# Patient Record
Sex: FEMALE | Race: WHITE | NOT HISPANIC OR LATINO | Employment: OTHER | URBAN - METROPOLITAN AREA
[De-identification: names, ages, dates, MRNs, and addresses within clinical notes are randomized per-mention and may not be internally consistent; named-entity substitution may affect disease eponyms.]

---

## 2023-04-30 ASSESSMENT — PROMIS GLOBAL HEALTH SCALE
EMOTIONAL_PROBLEMS: NEVER
RATE_AVERAGE_FATIGUE: MODERATE
CARRYOUT_PHYSICAL_ACTIVITIES: MOSTLY
RATE_AVERAGE_PAIN: 2
RATE_PHYSICAL_HEALTH: GOOD
RATE_QUALITY_OF_LIFE: VERY GOOD
RATE_GENERAL_HEALTH: GOOD
CARRYOUT_SOCIAL_ACTIVITIES: VERY GOOD
RATE_MENTAL_HEALTH: VERY GOOD
RATE_SOCIAL_SATISFACTION: VERY GOOD

## 2023-05-03 PROBLEM — R43.8 DECREASED SENSE OF SMELL: Status: ACTIVE | Noted: 2023-05-03

## 2023-05-03 PROBLEM — M25.559 ARTHRALGIA OF HIP: Status: ACTIVE | Noted: 2023-05-03

## 2023-05-03 PROBLEM — E03.9 HYPOTHYROIDISM (ACQUIRED): Status: ACTIVE | Noted: 2023-05-03

## 2023-05-03 PROBLEM — C34.90 LUNG CANCER (MULTI): Status: ACTIVE | Noted: 2023-05-03

## 2023-05-03 PROBLEM — J34.2 DEVIATED NASAL SEPTUM: Status: ACTIVE | Noted: 2023-05-03

## 2023-05-03 PROBLEM — H04.123 DRY EYES: Status: ACTIVE | Noted: 2023-05-03

## 2023-05-03 PROBLEM — J34.89 NASAL DRYNESS: Status: ACTIVE | Noted: 2023-05-03

## 2023-05-03 PROBLEM — M85.80 OSTEOPENIA: Status: ACTIVE | Noted: 2023-05-03

## 2023-05-03 PROBLEM — H93.8X3 FULLNESS IN EAR, BILATERAL: Status: ACTIVE | Noted: 2023-05-03

## 2023-05-03 PROBLEM — J31.0 CHRONIC RHINITIS: Status: ACTIVE | Noted: 2023-05-03

## 2023-05-03 PROBLEM — F41.9 ANXIETY: Status: ACTIVE | Noted: 2023-05-03

## 2023-05-03 PROBLEM — R44.8 FACIAL PRESSURE: Status: ACTIVE | Noted: 2023-05-03

## 2023-05-03 PROBLEM — N25.89 TYPE I RTA: Status: ACTIVE | Noted: 2023-05-03

## 2023-05-03 PROBLEM — R73.9 HYPERGLYCEMIA: Status: ACTIVE | Noted: 2023-05-03

## 2023-05-03 PROBLEM — R06.09 DOE (DYSPNEA ON EXERTION): Status: ACTIVE | Noted: 2023-05-03

## 2023-05-03 PROBLEM — H25.11 AGE-RELATED NUCLEAR CATARACT OF RIGHT EYE: Status: ACTIVE | Noted: 2023-05-03

## 2023-05-03 PROBLEM — N95.2 VAGINAL ATROPHY: Status: ACTIVE | Noted: 2023-05-03

## 2023-05-03 PROBLEM — R09.82 POSTNASAL DRIP: Status: ACTIVE | Noted: 2023-05-03

## 2023-05-03 PROBLEM — F51.01 PRIMARY INSOMNIA: Status: ACTIVE | Noted: 2023-05-03

## 2023-05-03 PROBLEM — C34.90 ADENOCARCINOMA OF LUNG (MULTI): Status: ACTIVE | Noted: 2023-05-03

## 2023-05-03 PROBLEM — D05.92 CARCINOMA IN SITU OF LEFT BREAST: Status: ACTIVE | Noted: 2023-05-03

## 2023-05-03 PROBLEM — H25.811 COMBINED FORMS OF AGE-RELATED CATARACT OF RIGHT EYE: Status: ACTIVE | Noted: 2023-05-03

## 2023-05-03 PROBLEM — J44.9 CHRONIC OBSTRUCTIVE PULMONARY DISEASE (MULTI): Status: ACTIVE | Noted: 2023-05-03

## 2023-05-03 PROBLEM — H04.123 DRY EYE SYNDROME OF BOTH LACRIMAL GLANDS: Status: ACTIVE | Noted: 2023-05-03

## 2023-05-03 PROBLEM — H25.812 COMBINED FORMS OF AGE-RELATED CATARACT OF LEFT EYE: Status: ACTIVE | Noted: 2023-05-03

## 2023-05-03 PROBLEM — R13.10 DYSPHAGIA: Status: ACTIVE | Noted: 2023-05-03

## 2023-05-03 PROBLEM — H25.12 AGE-RELATED NUCLEAR CATARACT OF LEFT EYE: Status: ACTIVE | Noted: 2023-05-03

## 2023-05-03 PROBLEM — C34.11 MALIGNANT NEOPLASM OF UPPER LOBE OF RIGHT LUNG (MULTI): Status: ACTIVE | Noted: 2023-05-03

## 2023-05-03 PROBLEM — K21.9 ESOPHAGEAL REFLUX: Status: ACTIVE | Noted: 2023-05-03

## 2023-05-03 PROBLEM — E78.5 DYSLIPIDEMIA: Status: ACTIVE | Noted: 2023-05-03

## 2023-05-03 RX ORDER — OMEPRAZOLE 20 MG/1
40 CAPSULE, DELAYED RELEASE ORAL
COMMUNITY
Start: 2020-04-27 | End: 2024-02-09 | Stop reason: SDUPTHER

## 2023-05-03 RX ORDER — ALBUTEROL SULFATE 90 UG/1
2 AEROSOL, METERED RESPIRATORY (INHALATION) EVERY 6 HOURS PRN
COMMUNITY
Start: 2016-01-27 | End: 2024-02-09 | Stop reason: ALTCHOICE

## 2023-05-03 RX ORDER — MULTIVITAMIN
1 TABLET ORAL DAILY
COMMUNITY
End: 2024-02-09 | Stop reason: ALTCHOICE

## 2023-05-03 RX ORDER — HYDROXYZINE PAMOATE 25 MG/1
25 CAPSULE ORAL EVERY 6 HOURS PRN
COMMUNITY
Start: 2022-05-02 | End: 2024-01-11 | Stop reason: ALTCHOICE

## 2023-05-03 RX ORDER — UBIDECARENONE 50 MG
1 CAPSULE ORAL 2 TIMES DAILY
COMMUNITY
Start: 2020-04-27 | End: 2023-05-05 | Stop reason: ALTCHOICE

## 2023-05-03 RX ORDER — MELATONIN 10 MG
1 CAPSULE ORAL NIGHTLY
COMMUNITY
Start: 2017-03-29 | End: 2024-02-09 | Stop reason: ALTCHOICE

## 2023-05-03 NOTE — PROGRESS NOTES
Subjective   Reason for Visit: Victorina Velez is an 72 y.o. female here for a Medicare Wellness visit.               HPI  The patient reports a history of intermittent episodes of stiffness and pain-unable to describe-at the CMC joint of the right thumb since childhood.  She reports that she will experience the stiffness with movement.  She reports no precipitating factors for the pain.  She reports no associated symptoms.    She also reports a history of more difficulty remembering names over the past year.  She reports no associated symptoms.    She reports occasional episodes of itching located over the back of the head over the past year.  She reports no precipitating, exacerbating, relieving factors.  Over the past year, she still reports continued chronic intermittent episodes of itching located over the upper and mid back regions.  She again reports that the episodes only occur when the patient leans against the back of a chair or another surface.  She reports no other associated symptoms and no change in the frequency or intensity of the episodes over the past year.    The patient reports continued chronic intermittent episodes of aching pain in the lower back region over the past year.  She reports that the episodes still are precipitated by walking longer distances.  She does report experiencing radiation of pain to the left calf for period of a few weeks late last year but has not experienced any radiation of pain recently.  She does report occasional episodes of weakness in the left leg occurring after periods of walking longer distances.  She reports no other associated symptoms.  She does report an increase in the frequency and intensity of the episodes over the past year.    She does report continued chronic intermittent episodes of soreness in the left hip region over the past year.  She reports that the episodes are precipitated by walking every day and by rising after having sat.  She reports no  radiation of discomfort and no other associated symptoms.  She does report an increase in the frequency and intensity of the episodes of soreness over the past year.    The patient reports that over the past year she has only been dyspneic when lifting heavy objects.  She has not experienced dyspnea walking up and down stairs.    She does report continued chronic nasal congestion and rhinorrhea times years.  She reports continued chronic constant thickness and irritation in the lower throat, continued chronic cough productive of clear sputum, continued chronic frequent belching times years-all unchanged over the past year.  She reports no associated symptoms.  She does report that she will experience less nasal congestion with use of AYR and Flonase Sensimist.    She reports no episodes of ear popping over the past year.  No other new complaints.  Patient Care Team:  Raghavendra Chávez MD as PCP - General  Raghavendra Chávez MD as PCP - [facility] Attributed Provider     Review of Systems  All systems have been reviewed and are normal except as previously noted  Objective   Vitals:  There were no vitals taken for this visit.      Physical Exam  Head- palpation revealed no tenderness over the maxillary or frontal sinuses  Eyes- extraocular movements intact pupils equal and reactive to light; right fundus revealed good retinal color, no hemorrhages or exudates; left fundus not well-visualized.  Ears- palpation of pinnas and traguses revealed no tenderness. External auditory canals not erythematous or swollen.  TMs clear  Nose-turbinates not erythematous or swollen; nasal septal deviation noted to the left  Mouth -no xerostomia noted. Posterior pharynx is not erythematous or swollen. Tonsillar pillars appeared normal no exudates  Neck no lymphadenopathy. Thyroid gland not enlarged. , No bruits.  Lungs-auscultation revealed occasional mild end expiratory wheezing left lower lung fields. No crackles noted. No dullness to  percussion noted. Normal fremitus, no egophony  Cardiac-rate normal rhythm regular no murmurs no JVD  Abdomen-soft nondistended normal active bowel sounds. Palpation revealed mild tenderness over the epigastrium and periumbilical regions,. No rebound tenderness or masses. Liver percussed to 8 cm in total span.  Pulses 2+ bilaterally in the upper extremities, 0 bilaterally in the lower extremities    Extremities-no peripheral edema  Musculoskeletal  CMC joint right thumb-no erythema or swelling.  Full range of motion in all directions of motion with no pain.  Palpation however did reveal moderate tenderness but no increase in warmth  Lumbar spine-no erythema or swelling. Full range of motion with pain and stiffness noted on bending to the left.  Full range of motion with no pain or stiffness noted in all other directions of motion.  Palpation revealed no tenderness or increase in warmth  Left hip-no erythema or swelling. Windshield wiper test negative. Full range of motion with pain noted on abduction and internal rotation. Full range of motion with no pain noted in all other directions of motion.. Palpation tenderness over the greater trochanter, no increase in warmth  Skin-yellowish discoloration of the toenails of both first toes noted bilaterally.  Yellowish discoloration also noted left fifth toe.  No erythema or eruptions noted.    Neurologic  Mental status-alert and oriented x3   Cranial nerves-2 through 12 grossly intact, no visual field abnormalities  Motor-no pronator drift noted, strength-5/5 in all muscle groups tested, except plantar flexors of both ankles 4/5, no tremor noted.  No bradykinesia noted.  No rigidity noted.  Negative pull test  Sensory-Light touch sensation fully intact  Pinprick sensation slightly diminished forefoot region right foot  Vibratory sensation diminished in the first toes bilaterally right greater than left and equally in the ankles and knees bilaterally  Cerebellar-no truncal  ataxia, good coordination finger-nose testing,, good coordination heel-to-shin testing, normal rapid alternating movements  Romberg negative, mildly decreased coordination in tandem gait  Reflexes-2+/4 bilaterally in the upper extremities, knee jerks 1+/4 bilaterally, ankle jerks 0/4 bilaterally    Mini cog test-3/3 words remembered, see clock face test dated May 4, 2023  Assessment/Plan   Problem List Items Addressed This Visit    None           Assessment  Noncardiac chest pain  Pericardial effusion-probably malignant in nature August 2014.  Chronic dyspnea on exertion--may be secondary to untreated obstructive/restrictive lung disease in the setting of lack of conditioning  Obstructive/restrictive lung disease.  Adenocarcinoma of the right lung status post right lower lobe lobectomy December 18, 2014-mediastinal lymph node dissection status post chemoradiation.  Bronchiectasis right perihilar region  Right pleural effusion-chronic  Radiation pneumonitis August 2015  COVID-19 December 2021  Chronic nasal congestion and rhinorrhea-probably secondary to structural abnormality, allergic rhinitis, nonallergic rhinitis  Deviated nasal septum to the left  Chronic constant thickness and irritation located in the lower throat-unsure of etiology. May be secondary to gastroesophageal reflux..  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 secondary overactive bladder  Chronic intermittent episodes of urinary  incontinence only occurring when the patient waits too long? 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  Chronic Intermittent episodes of soreness located in the left hip region-may be secondary to osteoarthritis of the left hip  Osteoarthritis of the left hip  Right hip greater trochanteric bursitis.  Status post right total hip replacement 2002.  Distal metatarsal fractures right fourth and fifth toes January 2014.  Impaired fasting glucose  Osteoporosis  Hypothyroidism  Hyperlipidemia  Acne rosacea  Chronic presence of a cystic lesion on the right elbow-likely represents a epidermal cyst  Ruptured epidermal cyst right elbow June 2020  Chronic intermittent itching noted in the medial corners of both eyes-may be secondary to dry eye syndrome  Bilateral cataracts status post removal of cataract right eye April 27, 2022  Bilateral posterior vitreous detachments  Dry eye syndrome...  Worsening memory for names-may be secondary to age-related cognitive decline  Intermittent episodes of itching located in the back of the head-unsure of etiology.  May be secondary to occipital neuralgia  Chronic intermittent episodes of itching located over the upper and mid back regions-unsure of etiology.  Chronic constant numbness noted underneath the third, fourth and fifth toes of both feet-unsure of etiology. May be secondary to peripheral neuropathy.  Chronic intermittent episodes of numbness in the fingers of both hands only occurring when the patient raises her arms when sleeping-may be secondary to bilateral compression neuropathy possibly bilateral ulnar neuropathy  Multilevel cervical spondylosis  Scoliosis thoracic spine..  Chronic  intermittent episodes of aching pain in the lower back region-probably secondary to osteoarthritis and degenerative disc disease of the lumbosacral spine  Central canal stenosis of the lumbar spine.  Bilateral neuroforaminal stenosis of the lumbar spine.  Chronic insomnia manifested as difficulty staying asleep-probably secondary to primary insomnia, anxiety.  Anxiety        Plan   Obtain EKG today.. Obtain urinalysis today.  Obtain CBC differential, CMP, fasting lipid profile, TSH, hemoglobin A1c, free T3, free T4, vitamin B12 level, vitamin D level today  Obtain repeat CT scan of the chest in August 2023. Refer back to ENT for further evaluation and treatment  I have urged the patient to use Gaviscon after each meal.  I did recommend that the patient begin application of Voltaren gel to painful regions every 6 hours as needed  The patient will continue all of her other current medications..  The patient will call me in 6 weeks with her condition regarding chronic constant thickness and irritation in the lower throat, chronic cough, chronic frequent belching  Patient should return for annual Medicare wellness visit in one year

## 2023-05-04 ENCOUNTER — OFFICE VISIT (OUTPATIENT)
Dept: PRIMARY CARE | Facility: CLINIC | Age: 73
End: 2023-05-04
Payer: MEDICARE

## 2023-05-04 ENCOUNTER — LAB (OUTPATIENT)
Dept: LAB | Facility: LAB | Age: 73
End: 2023-05-04
Payer: MEDICARE

## 2023-05-04 VITALS — BODY MASS INDEX: 23.97 KG/M2 | WEIGHT: 146.25 LBS

## 2023-05-04 DIAGNOSIS — E55.9 VITAMIN D DEFICIENCY: ICD-10-CM

## 2023-05-04 DIAGNOSIS — F51.01 PRIMARY INSOMNIA: ICD-10-CM

## 2023-05-04 DIAGNOSIS — R73.03 PREDIABETES: ICD-10-CM

## 2023-05-04 DIAGNOSIS — J44.9 CHRONIC OBSTRUCTIVE PULMONARY DISEASE, UNSPECIFIED COPD TYPE (MULTI): ICD-10-CM

## 2023-05-04 DIAGNOSIS — Z00.00 HEALTH MAINTENANCE EXAMINATION: Primary | ICD-10-CM

## 2023-05-04 DIAGNOSIS — C34.92 ADENOCARCINOMA OF LEFT LUNG (MULTI): ICD-10-CM

## 2023-05-04 DIAGNOSIS — C34.11 MALIGNANT NEOPLASM OF UPPER LOBE OF RIGHT LUNG (MULTI): ICD-10-CM

## 2023-05-04 DIAGNOSIS — E03.9 HYPOTHYROIDISM (ACQUIRED): ICD-10-CM

## 2023-05-04 DIAGNOSIS — R06.09 DOE (DYSPNEA ON EXERTION): ICD-10-CM

## 2023-05-04 LAB
ALANINE AMINOTRANSFERASE (SGPT) (U/L) IN SER/PLAS: 17 U/L (ref 7–45)
ALBUMIN (G/DL) IN SER/PLAS: 4.4 G/DL (ref 3.4–5)
ALKALINE PHOSPHATASE (U/L) IN SER/PLAS: 85 U/L (ref 33–136)
ALLERGEN ANIMAL: CAT DANDER IGE (KU/L): NORMAL
ALLERGEN ANIMAL: DOG DANDER IGE (KU/L): NORMAL
ALLERGEN GRASS: BERMUDA GRASS (CYNODON DACTYLON) IGE (KU/L): NORMAL
ALLERGEN GRASS: JOHNSON GRASS (SORGHUM HALEPENSE) IGE (KU/L): NORMAL
ALLERGEN GRASS: MEADOW GRASS, KENTUCKY BLUE (POA PRATENSIS )IGE (KU/L): NORMAL
ALLERGEN GRASS: TIMOTHY GRASS (PHLEUM PRATENSE) IGE (KU/L): NORMAL
ALLERGEN INSECT: COCKROACH IGE: NORMAL
ALLERGEN MICROORGANISM: ALTERNARIA ALTERNATA IGE (KU/L): NORMAL
ALLERGEN MICROORGANISM: ASPERGILLUS FUMIGATUS IGE (KU/L): NORMAL
ALLERGEN MICROORGANISM: CLADOSPORIUM HERBARUM IGE (KU/L): NORMAL
ALLERGEN MICROORGANISM: PENICILLIUM CHRYSOGENUM (P. NOTATUM) IGE (KU/L): NORMAL
ALLERGEN MITE: DERMATOPHAGOIDES FARINAE (HOUSE DUST MITE) IGE (KU/L): NORMAL
ALLERGEN MITE: DERMATOPHAGOIDES PTERONYSSINUS (HOUSE DUST MITE) IGE (KU/L): NORMAL
ALLERGEN TREE: BOX-ELDER (ACER NEGUNDO) IGE (KU/L): NORMAL
ALLERGEN TREE: COMMON SILVER BIRCH (BETULA VERRUCOSA) IGE (KU/L): NORMAL
ALLERGEN TREE: COTTONWOOD (POPULUS DELTOIDES) IGE (KU/L): NORMAL
ALLERGEN TREE: ELM (ULMUS AMERICANA) IGE (KU/L): NORMAL
ALLERGEN TREE: MAPLE LEAF SYCAMORE, LONDON PLANE IGE (KU/L): NORMAL
ALLERGEN TREE: MOUNTAIN JUNIPER (JUNIPERUS SABINOIDES) IGE (KU/L): NORMAL
ALLERGEN TREE: MULBERRY (MORUS ALBA) IGE (KU/L): NORMAL
ALLERGEN TREE: OAK (QUERCUS ALBA) IGE (KU/L): NORMAL
ALLERGEN TREE: PECAN, HICKORY (CARYA PECAN) IGE (KU/L): NORMAL
ALLERGEN TREE: WALNUT IGE: NORMAL
ALLERGEN TREE: WHITE ASH (FRAXINUS AMERICANA) IGE (KU/L): NORMAL
ALLERGEN WEED: COMMON PIGWEED (AMARANTHUS RETROFLEXUS) IGE (KU/L): NORMAL
ALLERGEN WEED: COMMON RAGWEED (AMB. ARTEMISIIFOLIA/A. ELATIOR) IGE (KU/L): NORMAL
ALLERGEN WEED: GOOSEFOOT, LAMB'S QUARTERS (CHENOPODIUM ALBUM) IGE (KU/L): NORMAL
ALLERGEN WEED: PLANTAIN (ENGLISH), RIBWORT (PLANTAGO LANCEOLATA) IGE (KU/L): NORMAL
ALLERGEN WEED: PRICKLY SALTWORT/RUSSIAN THISTLE (SALSOLA KALI) IGE (KU/L): NORMAL
ALLERGEN WEED: SHEEP SORREL (RUMEX ACETOSELLA) IGE (KU/L): NORMAL
ANION GAP IN SER/PLAS: 16 MMOL/L (ref 10–20)
ASPARTATE AMINOTRANSFERASE (SGOT) (U/L) IN SER/PLAS: 18 U/L (ref 9–39)
BASOPHILS (10*3/UL) IN BLOOD BY AUTOMATED COUNT: 0.02 X10E9/L (ref 0–0.1)
BASOPHILS/100 LEUKOCYTES IN BLOOD BY AUTOMATED COUNT: 0.5 % (ref 0–2)
BILIRUBIN TOTAL (MG/DL) IN SER/PLAS: 1 MG/DL (ref 0–1.2)
CALCIDIOL (25 OH VITAMIN D3) (NG/ML) IN SER/PLAS: 92 NG/ML
CALCIUM (MG/DL) IN SER/PLAS: 9.7 MG/DL (ref 8.6–10.6)
CARBON DIOXIDE, TOTAL (MMOL/L) IN SER/PLAS: 25 MMOL/L (ref 21–32)
CHLORIDE (MMOL/L) IN SER/PLAS: 104 MMOL/L (ref 98–107)
CHOLESTEROL (MG/DL) IN SER/PLAS: 283 MG/DL (ref 0–199)
CHOLESTEROL IN HDL (MG/DL) IN SER/PLAS: 50.8 MG/DL
CHOLESTEROL/HDL RATIO: 5.6
COBALAMIN (VITAMIN B12) (PG/ML) IN SER/PLAS: 714 PG/ML (ref 211–911)
CREATININE (MG/DL) IN SER/PLAS: 0.88 MG/DL (ref 0.5–1.05)
EOSINOPHILS (10*3/UL) IN BLOOD BY AUTOMATED COUNT: 0.08 X10E9/L (ref 0–0.4)
EOSINOPHILS/100 LEUKOCYTES IN BLOOD BY AUTOMATED COUNT: 2 % (ref 0–6)
ERYTHROCYTE DISTRIBUTION WIDTH (RATIO) BY AUTOMATED COUNT: 13.2 % (ref 11.5–14.5)
ERYTHROCYTE MEAN CORPUSCULAR HEMOGLOBIN CONCENTRATION (G/DL) BY AUTOMATED: 32.6 G/DL (ref 32–36)
ERYTHROCYTE MEAN CORPUSCULAR VOLUME (FL) BY AUTOMATED COUNT: 88 FL (ref 80–100)
ERYTHROCYTES (10*6/UL) IN BLOOD BY AUTOMATED COUNT: 4.68 X10E12/L (ref 4–5.2)
ESTIMATED AVERAGE GLUCOSE FOR HBA1C: 111 MG/DL
GFR FEMALE: 69 ML/MIN/1.73M2
GLUCOSE (MG/DL) IN SER/PLAS: 115 MG/DL (ref 74–99)
HEMATOCRIT (%) IN BLOOD BY AUTOMATED COUNT: 41.4 % (ref 36–46)
HEMOGLOBIN (G/DL) IN BLOOD: 13.5 G/DL (ref 12–16)
HEMOGLOBIN A1C/HEMOGLOBIN TOTAL IN BLOOD: 5.5 %
IMMATURE GRANULOCYTES/100 LEUKOCYTES IN BLOOD BY AUTOMATED COUNT: 0.5 % (ref 0–0.9)
IMMUNOCAP IGE: NORMAL
IMMUNOCAP INTERPRETATION: NORMAL
LDL: 192 MG/DL (ref 0–99)
LEUKOCYTES (10*3/UL) IN BLOOD BY AUTOMATED COUNT: 3.9 X10E9/L (ref 4.4–11.3)
LYMPHOCYTES (10*3/UL) IN BLOOD BY AUTOMATED COUNT: 1.06 X10E9/L (ref 0.8–3)
LYMPHOCYTES/100 LEUKOCYTES IN BLOOD BY AUTOMATED COUNT: 27 % (ref 13–44)
MONOCYTES (10*3/UL) IN BLOOD BY AUTOMATED COUNT: 0.42 X10E9/L (ref 0.05–0.8)
MONOCYTES/100 LEUKOCYTES IN BLOOD BY AUTOMATED COUNT: 10.7 % (ref 2–10)
NEUTROPHILS (10*3/UL) IN BLOOD BY AUTOMATED COUNT: 2.33 X10E9/L (ref 1.6–5.5)
NEUTROPHILS/100 LEUKOCYTES IN BLOOD BY AUTOMATED COUNT: 59.3 % (ref 40–80)
NON HDL CHOLESTEROL: 232 MG/DL
NRBC (PER 100 WBCS) BY AUTOMATED COUNT: 0 /100 WBC (ref 0–0)
PLATELETS (10*3/UL) IN BLOOD AUTOMATED COUNT: 277 X10E9/L (ref 150–450)
POC APPEARANCE, URINE: CLEAR
POC BILIRUBIN, URINE: NEGATIVE
POC BLOOD, URINE: NEGATIVE
POC COLOR, URINE: YELLOW
POC GLUCOSE, URINE: NEGATIVE MG/DL
POC KETONES, URINE: NEGATIVE MG/DL
POC LEUKOCYTES, URINE: NEGATIVE
POC NITRITE,URINE: NEGATIVE
POC PH, URINE: 7.5 PH
POC PROTEIN, URINE: NEGATIVE MG/DL
POC SPECIFIC GRAVITY, URINE: 1.01
POC UROBILINOGEN, URINE: 0.2 EU/DL
POTASSIUM (MMOL/L) IN SER/PLAS: 4.1 MMOL/L (ref 3.5–5.3)
PROTEIN TOTAL: 7 G/DL (ref 6.4–8.2)
SODIUM (MMOL/L) IN SER/PLAS: 141 MMOL/L (ref 136–145)
THYROTROPIN (MIU/L) IN SER/PLAS BY DETECTION LIMIT <= 0.05 MIU/L: 2.86 MIU/L (ref 0.44–3.98)
THYROXINE (T4) FREE (NG/DL) IN SER/PLAS: 1.18 NG/DL (ref 0.78–1.48)
TRIGLYCERIDE (MG/DL) IN SER/PLAS: 202 MG/DL (ref 0–149)
TRIIODOTHYRONINE (T3) FREE (PG/ML) IN SER/PLAS: 3.5 PG/ML (ref 2.3–4.2)
UREA NITROGEN (MG/DL) IN SER/PLAS: 15 MG/DL (ref 6–23)
VLDL: 40 MG/DL (ref 0–40)

## 2023-05-04 PROCEDURE — 84443 ASSAY THYROID STIM HORMONE: CPT

## 2023-05-04 PROCEDURE — 82607 VITAMIN B-12: CPT

## 2023-05-04 PROCEDURE — 36415 COLL VENOUS BLD VENIPUNCTURE: CPT

## 2023-05-04 PROCEDURE — 99213 OFFICE O/P EST LOW 20 MIN: CPT | Performed by: INTERNAL MEDICINE

## 2023-05-04 PROCEDURE — 84439 ASSAY OF FREE THYROXINE: CPT

## 2023-05-04 PROCEDURE — 1159F MED LIST DOCD IN RCRD: CPT | Performed by: INTERNAL MEDICINE

## 2023-05-04 PROCEDURE — 83036 HEMOGLOBIN GLYCOSYLATED A1C: CPT

## 2023-05-04 PROCEDURE — 80061 LIPID PANEL: CPT

## 2023-05-04 PROCEDURE — 1170F FXNL STATUS ASSESSED: CPT | Performed by: INTERNAL MEDICINE

## 2023-05-04 PROCEDURE — 84481 FREE ASSAY (FT-3): CPT

## 2023-05-04 PROCEDURE — 85025 COMPLETE CBC W/AUTO DIFF WBC: CPT

## 2023-05-04 PROCEDURE — 81002 URINALYSIS NONAUTO W/O SCOPE: CPT | Performed by: INTERNAL MEDICINE

## 2023-05-04 PROCEDURE — 82306 VITAMIN D 25 HYDROXY: CPT

## 2023-05-04 PROCEDURE — 80053 COMPREHEN METABOLIC PANEL: CPT

## 2023-05-04 PROCEDURE — 1160F RVW MEDS BY RX/DR IN RCRD: CPT | Performed by: INTERNAL MEDICINE

## 2023-05-04 ASSESSMENT — ACTIVITIES OF DAILY LIVING (ADL)
DOING_HOUSEWORK: INDEPENDENT
TAKING_MEDICATION: INDEPENDENT
GROCERY_SHOPPING: INDEPENDENT
DRESSING: INDEPENDENT
BATHING: INDEPENDENT
MANAGING_FINANCES: INDEPENDENT

## 2023-05-04 ASSESSMENT — PATIENT HEALTH QUESTIONNAIRE - PHQ9
1. LITTLE INTEREST OR PLEASURE IN DOING THINGS: NOT AT ALL
SUM OF ALL RESPONSES TO PHQ9 QUESTIONS 1 AND 2: 0
2. FEELING DOWN, DEPRESSED OR HOPELESS: NOT AT ALL

## 2023-05-04 ASSESSMENT — ENCOUNTER SYMPTOMS
DEPRESSION: 0
OCCASIONAL FEELINGS OF UNSTEADINESS: 0
LOSS OF SENSATION IN FEET: 0

## 2023-05-05 DIAGNOSIS — E78.5 DYSLIPIDEMIA: Primary | ICD-10-CM

## 2023-05-05 RX ORDER — SIMVASTATIN 20 MG/1
20 TABLET, FILM COATED ORAL NIGHTLY
Qty: 90 TABLET | Refills: 2 | Status: SHIPPED | OUTPATIENT
Start: 2023-05-05 | End: 2024-01-11 | Stop reason: WASHOUT

## 2023-05-05 RX ORDER — SIMVASTATIN 20 MG/1
20 TABLET, FILM COATED ORAL NIGHTLY
COMMUNITY
End: 2023-05-05 | Stop reason: SDUPTHER

## 2023-05-05 NOTE — PROGRESS NOTES
Labs reviewed.  I told the patient to discontinue red yeast rice and I have started her back on simvastatin at a dose of 20 mg nightly.  She will have a fasting lipid profile at the time that she has her surveillance CT scan of the chest later this summer

## 2023-08-09 DIAGNOSIS — C34.12 MALIGNANT NEOPLASM OF UPPER LOBE OF LEFT LUNG (MULTI): Primary | ICD-10-CM

## 2023-10-24 ENCOUNTER — OFFICE VISIT (OUTPATIENT)
Dept: OTOLARYNGOLOGY | Facility: CLINIC | Age: 73
End: 2023-10-24
Payer: MEDICARE

## 2023-10-24 VITALS — WEIGHT: 146 LBS | BODY MASS INDEX: 23.93 KG/M2 | TEMPERATURE: 97.6 F

## 2023-10-24 DIAGNOSIS — J34.89 NASAL AND SINUS DISCHARGE: ICD-10-CM

## 2023-10-24 DIAGNOSIS — R44.8 FACIAL PRESSURE: ICD-10-CM

## 2023-10-24 DIAGNOSIS — J34.2 NASAL SEPTAL DEVIATION: ICD-10-CM

## 2023-10-24 DIAGNOSIS — J31.0 CHRONIC RHINITIS: ICD-10-CM

## 2023-10-24 DIAGNOSIS — J34.2 DEVIATED NASAL SEPTUM: Primary | ICD-10-CM

## 2023-10-24 DIAGNOSIS — M26.69 TMJ CREPITUS: ICD-10-CM

## 2023-10-24 PROCEDURE — 1036F TOBACCO NON-USER: CPT | Performed by: NURSE PRACTITIONER

## 2023-10-24 PROCEDURE — 99213 OFFICE O/P EST LOW 20 MIN: CPT | Performed by: NURSE PRACTITIONER

## 2023-10-24 PROCEDURE — 1159F MED LIST DOCD IN RCRD: CPT | Performed by: NURSE PRACTITIONER

## 2023-10-24 PROCEDURE — 31231 NASAL ENDOSCOPY DX: CPT | Performed by: NURSE PRACTITIONER

## 2023-10-24 PROCEDURE — 1160F RVW MEDS BY RX/DR IN RCRD: CPT | Performed by: NURSE PRACTITIONER

## 2023-10-24 RX ORDER — DOXYCYCLINE HYCLATE 100 MG
TABLET ORAL
COMMUNITY
End: 2024-01-11 | Stop reason: ALTCHOICE

## 2023-10-24 ASSESSMENT — PATIENT HEALTH QUESTIONNAIRE - PHQ9
SUM OF ALL RESPONSES TO PHQ9 QUESTIONS 1 AND 2: 0
2. FEELING DOWN, DEPRESSED OR HOPELESS: NOT AT ALL
1. LITTLE INTEREST OR PLEASURE IN DOING THINGS: NOT AT ALL

## 2023-10-24 NOTE — PROGRESS NOTES
Subjective   Patient ID: Victorina Velez is a 73 y.o. female who presents for Sinusitis.  HPI  71 year old female with resolving episode of acute sinusitis following antibiotic therapy with persisting nasal discomfort. Area of crusting located along the right anterior septum and right middle turbinate. Recommended treatment with saline irrigations and mupirocin ointment.  11/16/22- Improved nasal exam with the exception of inferior turbinate hypertrophy and mucoid secretions throughout. Rec. flonase sensimist and saline gel.  3/15/23- Doing well with saline. Will resume flonase sensimist in spring. Allergy panel ordered.  10/24/23- Patient presents for follow-up after doing very well since March. She notes that last week she started having right sided facial pain. [facility] and was prescribed a 5 day course of doxycycline, which has not helped. She notes pressure to the right maxillary area. She has been irrigating but it has not been productive. She also feels discomfort in her right upper teeth. She has not been using nasal sprays because they make her nose too sore. She denies any nasal obstruction.    Review of Systems  Review of systems is negative for constitutional, ophthalmological, cardiac, pulmonary, renal, gastrointestinal, musculoskeletal, mental health, endocrine, or neurologic disorders (except as listed in the HPI, PMH, and Problem List).     Objective   Physical Exam  CONSTITUTIONAL: Vital signs reviewed. Patient appears well developed and well nourished.   GENERAL: this is a healthy appearing female who appears stated age. The patient is alert and appropriately verbally conversant without hoarseness. This patient is in no apparent distress.   FACE: The face was inspected and no cutaneous masses or lesions were visualized. There was no erythema or edema noted. Facial movement was symmetric. No skin lesions were detected. There was no sinus tenderness elicited. TMJ crepitus  present.   EYES: Extra-ocular muscle function was intact. No nystagmus was observed. Pupils were equal.     NOSE: Examination of the nose revealed the nasal dorsum to be midline. Intranasal exam reveals the septum is deviated left. The inferior turbinates were hypertrophic. No masses, polyps, mucopus, or other lesion on anterior rhinoscopy. See below procedure note as applicable for further exam.  ORAL CAVITY: Examination of the oral cavity revealed no mass lesions nor infection. The palate was noted to be intact. The tongue exhibited normal mobility. Mucosa was moist without lesion. The lips were free of lesion. Gums were free of inflammation. Dentition: normal without obvious infection or inflammation  OROPHARYNX: The oral pharynx was free of mass lesion or mucosal abnormality. The palate was noted to be without lesion. The uvula was normal appearing. The tonsils were Normal.  EARS: Examination of the ears revealed that the auricles were normally formed with no lesions. The external auditory canals were normal. The tympanic membranes were intact.  There is no inflammation visualized.   RESPIRATORY: Normal inspiration and expiration and chest wall expansion, no use of accessory muscles to breathe, no stridor.  NEUROLOGICAL: Patient is ambulatory without assist. Mentation is clear. Answering questions appropriately.    Nasal / sinus endoscopy    Date/Time: 10/24/2023 8:36 AM    Performed by: TABATHA Rivas  Authorized by: TABATHA Rivas    Consent:     Consent obtained:  Verbal    Consent given by:  Patient    Risks discussed:  Bleeding, infection and pain    Alternatives discussed:  No treatment, observation and delayed treatment  Procedure details:     Indications: assessment of airway and sino-nasal symptoms      Medication:  Afrin and Humberto-Synephrine 2%    Instrument: flexible fiberoptic nasal endoscope      Scope location: bilateral nare    Post-procedure details:     Patient  tolerance of procedure:  Tolerated well, no immediate complications  Comments:      Findings: After topical decongestion with decongestant and anesthetic spray, nasal endoscopy was performed using an endoscope. The septum was deviated left. The inferior turbinates were hypertrophic.  The middle turbinates appeared healthy, the middle meatus is free of purulence, masses, lesions or polyps. The superior meatus and sphenoethmoid recess are clear bilaterally. The nasal passageway is patent. The nasopharynx was clear. There were no complications and the patient tolerated the procedure well.          Assessment/Plan   ASSESSMENT:   71 year old female with resolving episode of acute sinusitis following antibiotic therapy with persisting nasal discomfort. Area of crusting located along the right anterior septum and right middle turbinate. Recommended treatment with saline irrigations and mupirocin ointment.  11/16/22- Improved nasal exam with the exception of inferior turbinate hypertrophy and mucoid secretions throughout. Rec. flonase sensimist and saline gel.  3/15/23- Doing well with saline. Will resume flonase sensimist in spring. Allergy panel ordered.  10/24/23- Acute flare of right sided facial pressure. Concern for TMJ as contributor. Rec. Resume fluticasone and gel. TMJ strategies.     PLAN:   1. Nasal endoscopy: left dev., ITH  2. I recommended the patient use a humidifier in the bedroom and in the house  3. Patient advised to resume using flonase sensimist to aid in nasal breathing. She was instructed on appropriate use.   4. Recommended she follow with ayr saline gel.  5. Recommended that she irrigate with saline as needed.  6. There was TMJ crepitus on exam. Rec. Remedies.   7. Patient advised to follow-up in 6-8 months or sooner if needed to assess for benefit of current therapy. Patient agrees with established plan of care and all questions were answered.

## 2023-10-24 NOTE — PATIENT INSTRUCTIONS
Today you were evaluated by Renee Triana CNP.    Please follow-up in 6 months or sooner if needed. If you have any questions or concerns, please contact my office at [phone].     MEDICATED NASAL SPRAY INSTRUCTIONS  Please take the prescribed nasal spray as directed. BE SURE TO POINT THE SPRAY TOWARDS THE CORNER OF THE EYE ON THE SAME SIDE NOSTRIL. This will ensure you are treating the appropriate parts of your nose that are swollen or inflamed.  This medication will take upwards of one month before you notice full benefit. You MUST use it every day for it to be effective.     Start using Ayr saline gel at least 3 times per day. This is an over the counter medication. If you obtain this in a spray bottle, please spray by lifting the outside of the nostril and spray directly at the center of your nose. After spraying, gently sniff back and pinch your nose. If you get this in a tube, please place a dime size amount on the pad of your thumb, swipe into the nostril, sniff back, then pinch your nose. Do NOT use a Q-tip or fingertip for application. If you are using medicated nasal sprays (flonase, azelastine, etc.), please apply AFTER application.     TMJ Remedies:  Consider a soft diet, avoid foods that require a lot of chewing.  Use anti-inflammatory medications (ibuprofen 400mg twice daily with food)  Apply hot compresses in front of your ear for 20 minutes 2-3 times each day.

## 2024-01-03 NOTE — PROGRESS NOTES
Victorina Velez female   1950 73 y.o.   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 Her breast cancer was found incidentally with a PET scan as workup for her lung cancer. 2016 Dr. Bryan Anderson performed left lumpectomy noting DCIS and lobular carcinoma in situ (LCIS), no microinvasion. 2016 she required left reexcision to clear margins. She completed radiation 2016 and completed 5 years of tamoxifen therapy 2017 -2022. She has family history of breast cancer in her mother, paternal grandmother and paternal cousin. She presents today for annual mammogram and exam. She denies any new masses or lumps.   Stage 0 pTis     LUNG CANCER TREATMENT HISTORY:                 1. T1a (vs T3, 2 lesion in same lobe)pN2 vs cN3M0, stage IIIA vs IIIB, RLL lung  adenocarcinoma diagnosed on 2014 by mediastinoscopy biopsy of station 7  nodes. TTF-1(+).   2.concurrent chemo RT with carboplatin AUC 5 and pemetrexed 500mg/m2 q21d for 2  cycle, a total of 45 cGy in 25 fx, complete on 2014  3. RLL lobectomy and mediastinal node dissection on 2014 with negative  margins and negative node involvement, [id number] on surgical resection   adjuvant chemo carboplatin AUC 6 and pemetrexed 500mg/m2 on 2/2/15 for one  cycle  4. Recurrent right pleural effusion after thoracic surgery. She underwent VATS on 2015, no evidence of cancer recurrence on direct visualization,  cytology (-). Pleurodeses was not successful and she had temporal chest tube  placement for drainage.     BREAST IMAGIN2023 Bilateral screening mammogram, indicates BI-RADS Category 2.     FEMALE HISTORY: menarche age 12, nullipara, OCP's x 15  yrs, menopause age 49, HRT x 1 yr, heterogeneously dense breast tissue     FAMILY CANCER HISTORY  Mother: Breast cancer, 72  Paternal Grandmother: Breast cancer  Maternal Cousin: Breast cancer  Father: Colon cancer age 54      Surgical History  She has a past surgical history that includes Hip surgery (02/21/2017); Breast lumpectomy (08/28/2018); Lung lobectomy (08/28/2018); and Other surgical history (02/09/2016).     Social History  She reports that she has never smoked. She has never used smokeless tobacco. No history on file for alcohol use and drug use.    Family History  Family History   Problem Relation Name Age of Onset    Breast cancer Mother          Allergies  Amoxicillin and Adhesive tape-silicones    Medications  Current Outpatient Medications   Medication Instructions    Al hyd-Mg tr-alg ac-sod bicarb (Gaviscon) 80-14.2 mg tablet,chewable oral    albuterol 90 mcg/actuation inhaler 2 puffs, inhalation, Every 6 hours PRN    amoxicillin (AMOXIL) 500 mg, oral, 4 times daily    calcium carbonate (CALCIUM 500 ORAL) oral    chlorhexidine (Peridex) 0.12 % solution SWISH AND SPIT WITH 15 ML BY MOUTH 2 TIMES A DAY FOR 30 SECONDS    cholecalciferol (Vitamin D-3) 125 MCG (5000 UT) capsule 1 tablet, oral, Daily    cholecalciferol (Vitamin D3) 400 unit capsule oral    estradiol (Estrace) 0.01 % (0.1 mg/gram) vaginal cream APPLY A PEA-SIZED AMOUNT TO VAGINAL OPENING EVERY MONDAY, WEDNESDAY, AND FRIDAY EVENING.    estrogens (conjugated) (PREMARIN) 0.5 g, vaginal, Daily    estrogens (conjugated) (PREMARIN) 0.5 g, vaginal, 2 times weekly    melatonin 10 mg capsule 1 capsule, oral, Nightly    metroNIDAZOLE (Metrogel) 1 % gel metroNIDAZOLE 1 % External Gel  Quantity: 60   Refills: 0  Start: 13-Jan-2022    multivit-min/ferrous fumarate (MULTI VITAMIN ORAL) oral    multivitamin tablet 1 tablet, oral, Daily    mupirocin (Bactroban) 2 % ointment SQUEEZE SMALL AMOUNT ONTO PAD OF THUMB AND INSERT INTO EACH NOSTRIL 2 TIMES  DAILY.    ofloxacin (Ocuflox) 0.3 % ophthalmic solution ophthalmic (eye), 4 times daily    omeprazole (PriLOSEC) 20 mg DR capsule 2 capsules, oral, Daily    omeprazole (PRILOSEC) 40 mg, oral, Daily RT    simvastatin (ZOCOR) 20 mg, oral, Nightly    THYROID ORAL oral    triamcinolone (Kenalog) 0.025 % cream 1 Application    triamcinolone (Kenalog) 0.1 % cream 1 Application         REVIEW OF SYSTEMS    Constitutional:  Negative for appetite change, fatigue, fever and unexpected weight change.   HENT:  Negative for ear pain, hearing loss, nosebleeds, sore throat and trouble swallowing.    Eyes:  Negative for discharge, itching and visual disturbance.   Respiratory:  Negative for cough, chest tightness and shortness of breath.    Cardiovascular:  Negative for chest pain, palpitations and leg swelling.   Breast: as indicated in HPI  Gastrointestinal:  Negative for abdominal pain, constipation, diarrhea and nausea.   Endocrine: Negative for cold intolerance and heat intolerance.   Genitourinary:  Negative for dysuria, frequency, hematuria, pelvic pain and vaginal bleeding.   Musculoskeletal:  Negative for arthralgias, back pain, gait problem, joint swelling and myalgias.   Skin:  Negative for color change and rash.   Allergic/Immunologic: Negative for environmental allergies and food allergies.   Neurological:  Negative for dizziness, tremors, speech difficulty, weakness, numbness and headaches.   Hematological:  Does not bruise/bleed easily.   Psychiatric/Behavioral:  Negative for agitation, dysphoric mood and sleep disturbance. The patient is not nervous/anxious.         Past Medical History  She has a past medical history of Breast cancer (CMS/[facility]), Chronic rhinitis (04/24/2019), Dysphonia (08/26/2021), Dyspnea, unspecified (02/09/2016), Encounter for other preprocedural examination (09/01/2016), Familial hypercholesterolemia (04/24/2018), Fracture of unspecified metatarsal bone(s), unspecified foot, initial encounter  for closed fracture (03/13/2014), Malignant neoplasm of unspecified part of unspecified bronchus or lung (CMS/HCC) (04/24/2019), Other abnormal and inconclusive findings on diagnostic imaging of breast (06/04/2019), Other specified disorders of ear, bilateral (11/24/2015), Personal history of irradiation, Personal history of other diseases of the respiratory system (02/09/2016), Personal history of other endocrine, nutritional and metabolic disease (04/27/2020), Personal history of pneumonia (recurrent) (07/28/2014), Personal history of urinary (tract) infections (03/25/2016), Pleurodynia (04/17/2015), Unspecified injury of unspecified foot, initial encounter (01/27/2014), Unspecified symptoms and signs involving the genitourinary system (10/26/2017), Urinary tract infection, site not specified (02/23/2018), and Urinary tract infection, site not specified.     Physical Exam  Patient is alert and oriented x3 and in a relaxed and appropriate mood. Her gait is steady and hand grasps are equal. Sclera is clear. The breasts are asymmetrical, right larger. The left breast has a well-healed central lateral incision with associated moderate tissue divot and palpable scar tissue. The tissue of both breasts is dense centrally and softer below without palpable abnormalities, discrete nodules or masses. The skin and nipples appear normal. There is no cervical, supraclavicular, or axillary lymphadenopathy palpable. Heart rate and rhythm normal, S1 and S2 appreciated. The lungs are clear bilaterally. Abdomen is soft & non-tender.         Physical Exam  Chest:              Last Recorded Vitals  Vitals:    01/11/24 0813   BP: 113/78   Pulse: 89       Relevant Results   Time was spent viewing digital images of the radiology testing with the patient. I explained the results in depth, along with suggested explanation for follow up recommendations based on the testing results. BI-RADS Category     Imaging     Narrative & Impression    Interpreted By:  Azeem Wilson,   STUDY:  BI MAMMO BILATERAL SCREENING TOMOSYNTHESIS;  1/11/2024 7:53 am      ACCESSION NUMBER(S):  [id number]      ORDERING CLINICIAN:  PURVI HONG      INDICATION:  Screening. History of left lumpectomy with radiation therapy. Patient  has a family history of breast cancer in her mother.      COMPARISON:  01/09/2023, 01/05/2022, 01/04/2021      FINDINGS:  2D and tomosynthesis images were reviewed at 1 mm slice thickness.      Density:  The breast tissue is heterogeneously dense, which may  obscure small masses.      Stable postsurgical changes in the upper outer left breast at  posterior depth and left axilla. Stable left breast post radiation  changes. No suspicious masses or calcifications are identified.      IMPRESSION:  No mammographic evidence of malignancy. Stable left breast post  treatment changes.      BI-RADS CATEGORY:  BI-RADS Category:  2 Benign.  Recommendation:  Routine Screening Mammogram in 1 Year.  Recommended Date:  1 Year.  Laterality:  Bilateral.       Assessment/Plan   Normal clinical exam and imaging, history of left breast cancer, no evidence of recurrence, post operative breast asymmetry, Tamoxifen therapy completed 5 years, history of lung cancer, family history of breast cancer, heterogeneously dense tissue     Plan: Return in one year for bilateral screening mammogram and office visit.      Patient Discussion/Summary  Your clinical examination and imaging are normal. Please return in one year for bilateral screening mammogram and office visit or sooner if you have any problems or concerns.     You can see your health information, review clinical summaries from office visits & test results online when you follow your health with MY  Chart, a personal health record. To sign up go to [email or web address]. If you need assistance with signing up or trouble getting into your account call [employer] Patient Line 24/7 at [phone].    My office  phone number is [phone] if you need to get in touch with me or have additional questions or concerns. Thank you for choosing [facility] and trusting me as your healthcare provider. I look forward to seeing you again at your next office visit. I am honored to be a provider on your health care team and I remain dedicated to helping you achieve your health goals.      Wendy Haider, APRN-CNP

## 2024-01-09 PROBLEM — L02.429 FURUNCLE OF EXTREMITY: Status: ACTIVE | Noted: 2024-01-09

## 2024-01-09 PROBLEM — M79.675 PAIN OF TOE OF LEFT FOOT: Status: ACTIVE | Noted: 2024-01-09

## 2024-01-09 PROBLEM — H52.10 MYOPIA WITH PRESBYOPIA: Status: ACTIVE | Noted: 2024-01-09

## 2024-01-09 PROBLEM — H52.4 MYOPIA WITH PRESBYOPIA: Status: ACTIVE | Noted: 2024-01-09

## 2024-01-09 PROBLEM — H01.003 BLEPHARITIS OF BOTH EYES: Status: ACTIVE | Noted: 2024-01-09

## 2024-01-09 PROBLEM — L30.9 DERMATITIS, UNSPECIFIED: Status: ACTIVE | Noted: 2022-03-14

## 2024-01-09 PROBLEM — H61.23 IMPACTED CERUMEN OF BOTH EARS: Status: ACTIVE | Noted: 2024-01-09

## 2024-01-09 PROBLEM — L71.9 ROSACEA, UNSPECIFIED: Status: ACTIVE | Noted: 2022-03-14

## 2024-01-09 PROBLEM — D22.5 MELANOCYTIC NEVI OF TRUNK: Status: ACTIVE | Noted: 2022-03-14

## 2024-01-09 PROBLEM — R21 RASH AND OTHER NONSPECIFIC SKIN ERUPTION: Status: ACTIVE | Noted: 2022-03-14

## 2024-01-09 PROBLEM — D22.4 MELANOCYTIC NEVI OF SCALP AND NECK: Status: ACTIVE | Noted: 2022-03-14

## 2024-01-09 PROBLEM — R91.1 LUNG NODULE, SOLITARY: Status: ACTIVE | Noted: 2024-01-09

## 2024-01-09 PROBLEM — H01.006 BLEPHARITIS OF BOTH EYES: Status: ACTIVE | Noted: 2024-01-09

## 2024-01-09 PROBLEM — D22.39 MELANOCYTIC NEVI OF OTHER PARTS OF FACE: Status: ACTIVE | Noted: 2022-03-14

## 2024-01-09 PROBLEM — R30.0 DYSURIA: Status: ACTIVE | Noted: 2024-01-09

## 2024-01-09 PROBLEM — L08.9 PUSTULES DETERMINED BY EXAMINATION: Status: ACTIVE | Noted: 2024-01-09

## 2024-01-09 PROBLEM — L30.1 DYSHIDROSIS (POMPHOLYX): Status: ACTIVE | Noted: 2022-03-14

## 2024-01-09 PROBLEM — J90 PLEURAL EFFUSION: Status: ACTIVE | Noted: 2024-01-09

## 2024-01-09 PROBLEM — M06.4 INFLAMMATORY POLYARTHROPATHY (MULTI): Status: ACTIVE | Noted: 2024-01-09

## 2024-01-09 PROBLEM — H43.811 PVD (POSTERIOR VITREOUS DETACHMENT), RIGHT EYE: Status: ACTIVE | Noted: 2024-01-09

## 2024-01-09 PROBLEM — L72.0 EPIDERMAL CYST: Status: ACTIVE | Noted: 2022-03-14

## 2024-01-09 PROBLEM — R10.32 ABDOMINAL PAIN, LLQ (LEFT LOWER QUADRANT): Status: ACTIVE | Noted: 2024-01-09

## 2024-01-09 PROBLEM — L57.9 SKIN CHANGES DUE TO CHRONIC EXPOSURE TO NONIONIZING RADIATION, UNSPECIFIED: Status: ACTIVE | Noted: 2022-03-14

## 2024-01-09 PROBLEM — H11.823 CONJUNCTIVOCHALASIS OF BOTH EYES: Status: ACTIVE | Noted: 2024-01-09

## 2024-01-09 PROBLEM — J01.00 ACUTE NON-RECURRENT MAXILLARY SINUSITIS: Status: ACTIVE | Noted: 2024-01-09

## 2024-01-09 PROBLEM — I30.9 PERICARDITIS, ACUTE (HHS-HCC): Status: ACTIVE | Noted: 2024-01-09

## 2024-01-09 PROBLEM — L82.1 OTHER SEBORRHEIC KERATOSIS: Status: ACTIVE | Noted: 2022-03-14

## 2024-01-09 PROBLEM — R09.81 NASAL CONGESTION: Status: ACTIVE | Noted: 2024-01-09

## 2024-01-09 PROBLEM — L57.0 ACTINIC KERATOSIS: Status: ACTIVE | Noted: 2022-03-14

## 2024-01-09 PROBLEM — H10.13 ACUTE ALLERGIC CONJUNCTIVITIS OF BOTH EYES: Status: ACTIVE | Noted: 2024-01-09

## 2024-01-09 PROBLEM — H52.13 MYOPIA OF BOTH EYES: Status: ACTIVE | Noted: 2024-01-09

## 2024-01-09 PROBLEM — H35.363 DRUSEN STAGE MACULAR DEGENERATION OF BOTH EYES: Status: ACTIVE | Noted: 2024-01-09

## 2024-01-09 PROBLEM — M16.12 PRIMARY OSTEOARTHRITIS OF LEFT HIP: Status: ACTIVE | Noted: 2024-01-09

## 2024-01-09 PROBLEM — D22.61 MELANOCYTIC NEVI OF RIGHT UPPER LIMB, INCLUDING SHOULDER: Status: ACTIVE | Noted: 2022-03-14

## 2024-01-09 PROBLEM — D18.01 HEMANGIOMA OF SKIN AND SUBCUTANEOUS TISSUE: Status: ACTIVE | Noted: 2022-03-14

## 2024-01-09 PROBLEM — R19.7 DIARRHEA OF PRESUMED INFECTIOUS ORIGIN: Status: ACTIVE | Noted: 2024-01-09

## 2024-01-09 PROBLEM — D48.5 NEOPLASM OF UNCERTAIN BEHAVIOR OF SKIN: Status: ACTIVE | Noted: 2022-03-14

## 2024-01-09 PROBLEM — Z96.1 PSEUDOPHAKIA OF BOTH EYES: Status: ACTIVE | Noted: 2024-01-09

## 2024-01-09 PROBLEM — H26.493 PCO (POSTERIOR CAPSULAR OPACIFICATION), BILATERAL: Status: ACTIVE | Noted: 2024-01-09

## 2024-01-09 PROBLEM — R51.9 FACIAL PAIN: Status: ACTIVE | Noted: 2024-01-09

## 2024-01-09 RX ORDER — MAG/ALUMINUM/SOD BICARB/ALGINC 14.2-80MG
1 TABLET,CHEWABLE ORAL DAILY PRN
COMMUNITY
Start: 2021-08-26 | End: 2024-02-09 | Stop reason: ALTCHOICE

## 2024-01-09 RX ORDER — UBIDECARENONE 50 MG
CAPSULE ORAL
COMMUNITY
Start: 2020-04-27 | End: 2024-01-11 | Stop reason: ALTCHOICE

## 2024-01-09 RX ORDER — ALENDRONATE SODIUM 5 MG/1
TABLET ORAL
COMMUNITY
End: 2024-01-11 | Stop reason: ALTCHOICE

## 2024-01-09 RX ORDER — TRIAMCINOLONE ACETONIDE 0.25 MG/G
CREAM TOPICAL
COMMUNITY
Start: 2021-12-30 | End: 2024-02-09 | Stop reason: ALTCHOICE

## 2024-01-09 RX ORDER — PREDNISOLONE ACETATE 10 MG/ML
SUSPENSION/ DROPS OPHTHALMIC
COMMUNITY
Start: 2022-03-17 | End: 2024-01-11 | Stop reason: ALTCHOICE

## 2024-01-09 RX ORDER — FERROUS GLUCONATE 256(28)MG
1 TABLET ORAL EVERY OTHER DAY
COMMUNITY
End: 2024-01-11 | Stop reason: ALTCHOICE

## 2024-01-09 RX ORDER — OFLOXACIN 3 MG/ML
SOLUTION/ DROPS OPHTHALMIC 4 TIMES DAILY
COMMUNITY
Start: 2022-03-17 | End: 2024-02-09 | Stop reason: ALTCHOICE

## 2024-01-09 RX ORDER — ESTRADIOL 0.1 MG/G
CREAM VAGINAL
COMMUNITY
End: 2024-04-30

## 2024-01-09 RX ORDER — AMOXICILLIN 500 MG/1
500 CAPSULE ORAL 4 TIMES DAILY
COMMUNITY
Start: 2023-10-26 | End: 2024-02-09 | Stop reason: ALTCHOICE

## 2024-01-09 RX ORDER — METRONIDAZOLE 10 MG/G
GEL TOPICAL
COMMUNITY
Start: 2022-01-13 | End: 2024-02-09 | Stop reason: ALTCHOICE

## 2024-01-09 RX ORDER — CALCIUM CARB/VITAMIN D3/VIT K1 500-500-40
TABLET,CHEWABLE ORAL
COMMUNITY
Start: 2009-12-02 | End: 2024-02-09 | Stop reason: ALTCHOICE

## 2024-01-09 RX ORDER — CHLORHEXIDINE GLUCONATE ORAL RINSE 1.2 MG/ML
SOLUTION DENTAL
COMMUNITY
Start: 2023-10-26 | End: 2024-02-20 | Stop reason: HOSPADM

## 2024-01-09 RX ORDER — MUPIROCIN 20 MG/G
OINTMENT TOPICAL
COMMUNITY
Start: 2022-08-17 | End: 2024-02-09 | Stop reason: ALTCHOICE

## 2024-01-09 RX ORDER — TRIAMCINOLONE ACETONIDE 1 MG/G
CREAM TOPICAL
COMMUNITY
Start: 2020-07-08 | End: 2024-02-09 | Stop reason: SDUPTHER

## 2024-01-09 RX ORDER — CHOLECALCIFEROL (VITAMIN D3) 125 MCG
1 CAPSULE ORAL DAILY
COMMUNITY

## 2024-01-09 RX ORDER — OMEPRAZOLE 20 MG/1
2 CAPSULE, DELAYED RELEASE ORAL DAILY
COMMUNITY
Start: 2020-04-27 | End: 2024-02-20 | Stop reason: HOSPADM

## 2024-01-11 ENCOUNTER — OFFICE VISIT (OUTPATIENT)
Dept: SURGICAL ONCOLOGY | Facility: CLINIC | Age: 74
End: 2024-01-11
Payer: MEDICARE

## 2024-01-11 ENCOUNTER — LAB REQUISITION (OUTPATIENT)
Dept: LAB | Facility: HOSPITAL | Age: 74
End: 2024-01-11
Payer: MEDICARE

## 2024-01-11 ENCOUNTER — ANCILLARY PROCEDURE (OUTPATIENT)
Dept: RADIOLOGY | Facility: CLINIC | Age: 74
End: 2024-01-11
Payer: MEDICARE

## 2024-01-11 VITALS
DIASTOLIC BLOOD PRESSURE: 78 MMHG | SYSTOLIC BLOOD PRESSURE: 113 MMHG | WEIGHT: 148.4 LBS | BODY MASS INDEX: 23.85 KG/M2 | HEART RATE: 89 BPM | HEIGHT: 66 IN

## 2024-01-11 DIAGNOSIS — Z12.31 ENCOUNTER FOR SCREENING MAMMOGRAM FOR MALIGNANT NEOPLASM OF BREAST: ICD-10-CM

## 2024-01-11 DIAGNOSIS — Z08 ENCOUNTER FOR FOLLOW-UP SURVEILLANCE OF BREAST CANCER: Primary | ICD-10-CM

## 2024-01-11 DIAGNOSIS — R30.0 DYSURIA: ICD-10-CM

## 2024-01-11 DIAGNOSIS — Z85.3 ENCOUNTER FOR FOLLOW-UP SURVEILLANCE OF BREAST CANCER: Primary | ICD-10-CM

## 2024-01-11 PROCEDURE — 99213 OFFICE O/P EST LOW 20 MIN: CPT | Performed by: NURSE PRACTITIONER

## 2024-01-11 PROCEDURE — 77063 BREAST TOMOSYNTHESIS BI: CPT | Mod: BILATERAL PROCEDURE | Performed by: STUDENT IN AN ORGANIZED HEALTH CARE EDUCATION/TRAINING PROGRAM

## 2024-01-11 PROCEDURE — 1126F AMNT PAIN NOTED NONE PRSNT: CPT | Performed by: NURSE PRACTITIONER

## 2024-01-11 PROCEDURE — 77067 SCR MAMMO BI INCL CAD: CPT

## 2024-01-11 PROCEDURE — 1160F RVW MEDS BY RX/DR IN RCRD: CPT | Performed by: NURSE PRACTITIONER

## 2024-01-11 PROCEDURE — 77067 SCR MAMMO BI INCL CAD: CPT | Mod: BILATERAL PROCEDURE | Performed by: STUDENT IN AN ORGANIZED HEALTH CARE EDUCATION/TRAINING PROGRAM

## 2024-01-11 PROCEDURE — 1036F TOBACCO NON-USER: CPT | Performed by: NURSE PRACTITIONER

## 2024-01-11 PROCEDURE — 87086 URINE CULTURE/COLONY COUNT: CPT

## 2024-01-11 ASSESSMENT — PAIN SCALES - GENERAL: PAINLEVEL: 0-NO PAIN

## 2024-01-11 NOTE — PATIENT INSTRUCTIONS
Your clinical examination and imaging are normal. Please return in one year for bilateral screening mammogram and office visit or sooner if you have any problems or concerns.     You can see your health information, review clinical summaries from office visits & test results online when you follow your health with MY  Chart, a personal health record. To sign up go to [email or web address]. If you need assistance with signing up or trouble getting into your account call [employer] Patient Line 24/7 at [phone].    My office phone number is [phone] if you need to get in touch with me or have additional questions or concerns. Thank you for choosing [facility] and trusting me as your healthcare provider. I look forward to seeing you again at your next office visit. I am honored to be a provider on your health care team and I remain dedicated to helping you achieve your health goals.

## 2024-01-13 LAB — BACTERIA UR CULT: NORMAL

## 2024-01-17 ENCOUNTER — HOSPITAL ENCOUNTER (OUTPATIENT)
Dept: RADIOLOGY | Facility: HOSPITAL | Age: 74
Discharge: HOME | End: 2024-01-17
Payer: MEDICARE

## 2024-01-17 ENCOUNTER — OFFICE VISIT (OUTPATIENT)
Dept: ORTHOPEDIC SURGERY | Facility: CLINIC | Age: 74
End: 2024-01-17
Payer: MEDICARE

## 2024-01-17 DIAGNOSIS — M25.551 BILATERAL HIP PAIN: ICD-10-CM

## 2024-01-17 DIAGNOSIS — M70.61 TROCHANTERIC BURSITIS OF RIGHT HIP: Primary | ICD-10-CM

## 2024-01-17 DIAGNOSIS — M25.552 BILATERAL HIP PAIN: ICD-10-CM

## 2024-01-17 PROCEDURE — 1126F AMNT PAIN NOTED NONE PRSNT: CPT | Performed by: ORTHOPAEDIC SURGERY

## 2024-01-17 PROCEDURE — 1036F TOBACCO NON-USER: CPT | Performed by: ORTHOPAEDIC SURGERY

## 2024-01-17 PROCEDURE — 73502 X-RAY EXAM HIP UNI 2-3 VIEWS: CPT | Mod: RT

## 2024-01-17 PROCEDURE — 1159F MED LIST DOCD IN RCRD: CPT | Performed by: ORTHOPAEDIC SURGERY

## 2024-01-17 PROCEDURE — 73502 X-RAY EXAM HIP UNI 2-3 VIEWS: CPT | Mod: LT

## 2024-01-17 PROCEDURE — 99214 OFFICE O/P EST MOD 30 MIN: CPT | Performed by: ORTHOPAEDIC SURGERY

## 2024-01-17 ASSESSMENT — PAIN - FUNCTIONAL ASSESSMENT: PAIN_FUNCTIONAL_ASSESSMENT: NO/DENIES PAIN

## 2024-01-18 ENCOUNTER — TELEPHONE (OUTPATIENT)
Dept: OBSTETRICS AND GYNECOLOGY | Facility: CLINIC | Age: 74
End: 2024-01-18
Payer: MEDICARE

## 2024-01-18 NOTE — TELEPHONE ENCOUNTER
Pt called and was put on estriol cream, she has been on it six months, she went to urgent care last Thursday for uti. Was on cipro for it. She is irritated from possibly the cream. She didn't feel like she had a uti at the time. Just irritated down there. Please advised. Thank you

## 2024-01-19 ENCOUNTER — PATIENT MESSAGE (OUTPATIENT)
Dept: ORTHOPEDIC SURGERY | Facility: HOSPITAL | Age: 74
End: 2024-01-19

## 2024-01-19 DIAGNOSIS — N76.2 ACUTE VULVITIS: Primary | ICD-10-CM

## 2024-01-19 PROBLEM — M16.12 UNILATERAL PRIMARY OSTEOARTHRITIS, LEFT HIP: Status: ACTIVE | Noted: 2024-01-19

## 2024-01-19 RX ORDER — FLUCONAZOLE 150 MG/1
150 TABLET ORAL ONCE
Qty: 2 TABLET | Refills: 0 | Status: SHIPPED | OUTPATIENT
Start: 2024-01-19 | End: 2024-01-19

## 2024-01-19 NOTE — TELEPHONE ENCOUNTER
I talked to the patient.  She has some mild external irritation.  She has been on Premarin cream or estradiol cream for years.  Her visit to the urgent care suggested UTI, but I can see the final culture from January 11, 2024 was normal.  She is finished with Cipro but took the whole course.  I suggested it could be dry skin as the weather has been very cold.  She will apply externally Aquaphor or Vaseline.  I ordered Diflucan to use if her symptoms of irritation and itchiness are worsening.  She does have follow-up in June 2024 and does mention she will contact us when she needs a refill of estradiol cream.

## 2024-01-30 NOTE — PROGRESS NOTES
Thank you for attending our Joint Replacement class today in preparation for your upcoming surgery.  Topics discussed include:    MyChart Enrollment  Communication with Care Team  My Chart is the best form of communication to reach all of your caregivers  You can send messages to specific care givers, or a care team  Continued Education  You will be enrolled in a Total Joint Replacement care plan to receive additional education before and after surgery  You can review a short recording of the class content  Access to Medical Records  You can access test results, office notes, appointments, etc.  You can connect to other healthcare systems who use [employer] ([facility], [facility], [facility], etc.)  Voice Assist  Program Information  Consent to Enroll    Background/Understanding of Joint Replacement Surgery  Potential for same day discharge  Any questions or concerns to be directed to the surgeon's office    How to Prepare for Surgery  Use of Nicotine Products/Smoking  Stop several weeks before surgery  Such products slow down the healing process and increase risk of post-op infection and complications  Clearance for Surgery  Medical Clearance by Specialists  Dental Clearance  Cracked/Broken/Loose teeth left untreated may postpone surgery  The importance of post-op antibiotics for dental visits per surgeon protocol  Preadmission Testing  **Potential for postponed surgery if appropriate clearance is not obtained  Medication Instruction  Follow instructions provided by the doctor who prescribes your medication (typically, but not limited to cardiologist)  Preadmission testing will provide additional instructions during your appointment on what to stop and what to take as you get closer to surgery  For clarification of these instructions, please call preadmission testing directly - [phone]  Tips for Preparing the home for discharge from the hospital  Care Partner  Requirement for surgery, the patient must have a plan to have  help at home  Potential for postponed surgery if plan for home support cannot be established  How the care partner can help after surgery  CHG Body Wash/Mouth Wash  Follow the instructions given at preadmission testing  Body wash is to be used on the body and hair for 5 washes  Mouthwash is to be used the night before and morning of surgery  **This is a system-wide protocol developed by infectious disease professionals, we will not alter our recommendations for those with sensitive skin or those who have special hair needs.  Please follow the instructions as they are written as this will provide the best infection prevention measures for surgery.  Should you have an allergy to one of the products, please discuss with your preadmission team**    What to Expect in the Hospital/At Home  Morning of Surgery NPO Guidelines  Nothing to eat after midnight  Water can be consumed up to 2 hours prior to arrival  Surgical and Post-Surgical Care Team  Surgical Team  Anesthesia Team  Nursing  Physical Therapy  Care Coordinating  Pharmacy  Hospital Arrival Instructions  Arrive at the time provided to you  Consider traffic patterns (rush-hour) based on arrival time  Have arrangements made for a ride home  If discharging same day, care partner should remain at the hospital  Recovering after Surgery  Recovery Room - Visitors are not brought back  Transition to hospital room - 2nd Floor, Visitors will be directed to your room  The presence of and strategies for controlling surgical pain and swelling  The importance of early mobility  Side effects after surgery  What to expect if staying overnight    Discharge Planning  The intended plan for discharge will be for patients to discharge home  All patients require a care partner (family, friend, neighbor, etc.) to stay with the patient for the first few nights after surgery  The inability to secure help at home will postpone surgery  Home Care Services set up per surgeon order  Physical  Therapy  Occupational Therapy  **If desired, private duty care can be arranged by the patient ahead of time**  Outpatient Physical Therapy per surgeon order    Recovering at Home  Wound Care  Follow wound care instructions found in your discharge paperwork  Bandage is water-resistant and you may shower with the bandage  Do not scrub directly over the bandage  Do not submerge in water until cleared (bathtub, hot tub, pool, etc.)    Post-Op Risk Prevention  Infection Prevention  Promptly seek treatment for any infections post-operatively  Routine dental visits must be postponed for 3 months after surgery  Your surgeon may require antibiotics prior to future dental visits  Any concerns for infection not related directly to the knee or the hip should be managed by your primary care provider  Blood Clots  Be sure to complete the course of blood thinning medication as prescribed by your surgeon  Movement every 1-2 hours during the day is encouraged to prevent blood clots  Monitor for signs of blood clots  Wear compression stockings as prescribed by your surgeon  Constipation  Constipation is common following surgery  Drink plenty of fluids  Take stool softener/laxative as prescribed by your surgeon  Move around frequently  Eat foods high in fiber  Fall Prevention  Prepare home ahead of time to clear space to move with walker  Remove throw rugs and electrical cords from walkways  Install railings near any stairways with more than 2 steps  Use night lights for increased visibility at night  Continue to use your assistive device until cleared by surgeon or physical therapy  Dislocation Prevention - Not all procedures will have dislocation precautions  Follow dislocation precautions provided by your surgeon  It is OK to resume sexual activity about 6 weeks following surgery  Be sure to follow any dislocation precautions assigned    Durable Medical Equipment  Cold Therapy  Breg Cold Therapy Machines  Ice/Gel Packs  Assistive  Devices  Folding Walker with Wheels (in the front only)  No Rollators  Crutches if approved by Physical Therapy and Surgeon after surgery  Hip Kits  Raised Toilet Seats  Additional Compression Stockings    Joint Preservation  Healthy Activities when Cleared  Walking  Swimming  Bike Riding  Activities to Avoid  Refrain from repetitive motions which have a high impact on the joint  Gradual Progression  Progress activity slowly, listen to your body  Common Findings - NORMAL after surgery  Clicking/Grinding  Numbness near incision    Physical Therapy  Prehabilitation exercises  START TODAY ON BOTH LEGS  Surgery Specific Precautions  Follow surgery specific precautions found in your discharge paperwork    Follow-Up Visit  All patients will see their surgeon for a follow up visit after surgery  The visit may range from 2-6 weeks after surgery and is surgeon specific      Please don't hesitate to reach out if you have any additional questions or concerns.    Arti Stallings MBA, BSN, RN-BC  FLORECITA BassettN, RN  Orthopedic Program Navigators  [facility]   [phone]

## 2024-01-31 ENCOUNTER — PROCEDURE VISIT (OUTPATIENT)
Dept: ORTHOPEDIC SURGERY | Facility: HOSPITAL | Age: 74
End: 2024-01-31
Payer: MEDICARE

## 2024-01-31 ENCOUNTER — EDUCATION (OUTPATIENT)
Dept: ORTHOPEDIC SURGERY | Facility: HOSPITAL | Age: 74
End: 2024-01-31
Payer: MEDICARE

## 2024-01-31 PROCEDURE — E0244 TOILET SEAT RAISED: HCPCS | Performed by: ORTHOPAEDIC SURGERY

## 2024-01-31 PROCEDURE — A665321A HIP KIT: Performed by: ORTHOPAEDIC SURGERY

## 2024-01-31 PROCEDURE — E0143 WALKER FOLDING WHEELED W/O S: HCPCS | Performed by: ORTHOPAEDIC SURGERY

## 2024-01-31 ASSESSMENT — HOOS JR
RISING FROM SITTING: MODERATE
WALKING ON UNEVEN SURFACE: MILD
GOING UP OR DOWN STAIRS: MODERATE
LYING IN BED (TURNING OVER, MAINTAINING HIP POSITION): MILD
BENDING TO THE FLOOR TO PICK UP OBJECT: MODERATE
SITTING: MODERATE
HOOS JR TOTAL INTERVAL SCORE: 58.93

## 2024-02-04 PROCEDURE — 20610 DRAIN/INJ JOINT/BURSA W/O US: CPT | Performed by: ORTHOPAEDIC SURGERY

## 2024-02-04 RX ORDER — TRIAMCINOLONE ACETONIDE 40 MG/ML
2 INJECTION, SUSPENSION INTRA-ARTICULAR; INTRAMUSCULAR
Status: COMPLETED | OUTPATIENT
Start: 2024-02-04 | End: 2024-02-04

## 2024-02-04 RX ADMIN — TRIAMCINOLONE ACETONIDE 2 ML: 40 INJECTION, SUSPENSION INTRA-ARTICULAR; INTRAMUSCULAR at 14:18

## 2024-02-04 NOTE — PROGRESS NOTES
Patient is a 73-year-old female presents today for evaluation of her right total hip arthroplasty as well as left hip osteoarthritis.  She does have groin pain on the left.  She does take anti-inflammatories.  She has some laterally based hip pain on the right and points to her trochanter as the location of that.  She previous underwent right total hip arthroplasty and did well from that standpoint.    Right hip:  AAOx3, NAD, walks with a non-antalgic gait  No pain with flexion internal rotation  Negative Stinchfield  Mild TTP over trochanter laterally  5/5 Hip flexion/knee extension/df/pf/ehl  SILT in a chirag/saph/per/tib distribution  ½ dorsalis pedis and posterior tibial pulse  no popliteal or inguinal lymphadenopathy  no other overlying lesions  mood: euthymic  Respirations non labored  Incision healed    Left:    AAOx3, NAD, walks with a moderate antalgic gait  Significant pain with flexion internal rotation  Positive Stinchfield  Mild TTP over trochanter laterally  5/5 Hip flexion/knee extension/df/pf/ehl  SILT in a chirag/saph/per/tib distribution  ½ dorsalis pedis and posterior tibial pulse  no popliteal or inguinal lymphadenopathy  no other overlying lesions  mood: euthymic  Respirations non    Plain films were reviewed by myself in clinic today.  They have advanced osteoarthritis of their hip with significant joint space narrowing, bone on bone changes, underlying sclerosis and bipolar osteophytic change.    Patient is now failed a greater than 3-month trial of reasonable conservative treatment and wishes to proceed with total hip arthroplasty which is indicated at this time.  We discussed the risk benefits alternatives to surgery.  All of their questions were answered.    Procedure: left total hip  Location: [facility]  ICD10: M16.12  CPT: 57004  Stay: overnight  Equipment: [email or web address]    I talked with the patient at length about risks, limitations, benefits and alternatives to total hip replacement today.  I reviewed concerns about implant wear, loosening, breakage, infection, infection prophylaxis, DVT, PE, death and other medical and anesthetic complications of surgery. We talked about the potential for persistent pain following surgery since there are many possible causes for hip and leg pain. The patient was advised that hip replacement will only relieve pain that is coming from the hip. We talked about leg length discrepancy, neurovascular problems and dislocation after surgery. The patient understands that we may have to lengthen the leg slightly to provide for adequate stability of the hip. I reviewed dislocation precautions and activity restrictions in detail. We discussed advantages and disadvantages of cemented and cementless implant fixation. We discussed the concerns about intraoperative fracture, ingrowth failure, thigh pain and possible post-operative weight bearing restrictions following cementless hip replacement. We discussed advantages and disadvantages of different surgical approaches. We discussed the possible need for a homologous blood transfusion. We discussed the fact that many of our patients are able to go home in 1 day or less depending on their health, mobility, pre-op preparation, individual home situation and personal preference. The patient has identified their personal goals of their joint replacement surgery and recovery and we have discussed them. These are documented in our [employer] patient engagement platform. In addition, we have discussed the advantages and disadvantages of various implant and fixation options, as well as various surgical approaches.  The basic concepts of the joint replacement procedure has been reviewed with the patient and the patient has been provided the opportunity to see an actual implant either in the office or in our pre-op education class. All of the patients questions were answered. The patient can call my office to schedule surgery and the pre-op  teaching class. I told the patient that they should contact their primary care physician to discuss fitness for surgery.     This note was created using voice recognition software and was not corrected for typographical or grammatical errors.     L Inj/Asp: L greater trochanteric bursa on 2/4/2024 2:18 PM  Indications: pain  Details: 22 G (Spinal needle  ) needle, lateral approach  Medications: 2 mL triamcinolone acetonide 40 mg/mL      Greater Trochanteric bursitis cortisone injection procedure note:  Discussion:  I discussed the conservative treatment options for Greater Trochanteric Bursitis including but not limited to physical therapy, oral NSAIDS, activity and lifestyle modification, and corticosteroid injections. Pt has elected to try a cortisone injection today. I have explained the risk and benefits of an injection including the possibility of joint infection, bleeding, damage to cartilage, allergic reaction. Patient verbalized understanding and gave verbal consent wishes to proceed with a intraarticular cortisone injection for their peribursal area. We discussed the risks and benefits and potential morbidity related to the treatment, and to the prescription medication administered in the injection    Procedure    With the patient's informed verbal consent, the left hip greater trochanteric area was prepped in standard sterile fashion with Chlorhexidine in a sidelying position. The skin was then anesthetized with ethyl chloride spray and cleaned again with Chlorhexidine. The peritrochanteric soft tissue was then injected with a prefilled spinal needle syringe of 80 mg Kenalog +8 ml Lidocaine without complications.  The patient tolerated this well and felt immediate initial relief of symptoms. A bandaid was applied and the patient ambulated out of the clinic on ther own accord without difficulty. Patient should contact the office if any signs of of infection appear: redness, fever, chills, drainage, swelling  or warmth to the knees.  Pt understands that the injections can be repeated no sooner than 3 months.    Procedure, treatment alternatives, risks and benefits explained, specific risks discussed. Consent was given by the patient. Immediately prior to procedure a time out was called to verify the correct patient, procedure, equipment, support staff and site/side marked as required. Patient was prepped and draped in the usual sterile fashion.

## 2024-02-05 ENCOUNTER — TELEPHONE (OUTPATIENT)
Dept: ORTHOPEDIC SURGERY | Facility: HOSPITAL | Age: 74
End: 2024-02-05
Payer: MEDICARE

## 2024-02-09 ENCOUNTER — PRE-ADMISSION TESTING (OUTPATIENT)
Dept: PREADMISSION TESTING | Facility: HOSPITAL | Age: 74
End: 2024-02-09
Payer: MEDICARE

## 2024-02-09 VITALS
SYSTOLIC BLOOD PRESSURE: 151 MMHG | HEART RATE: 99 BPM | BODY MASS INDEX: 23.82 KG/M2 | DIASTOLIC BLOOD PRESSURE: 95 MMHG | HEIGHT: 65 IN | RESPIRATION RATE: 16 BRPM | TEMPERATURE: 98.4 F | OXYGEN SATURATION: 96 % | WEIGHT: 142.97 LBS

## 2024-02-09 DIAGNOSIS — M16.12 UNILATERAL PRIMARY OSTEOARTHRITIS, LEFT HIP: ICD-10-CM

## 2024-02-09 DIAGNOSIS — Z01.818 ENCOUNTER FOR PREADMISSION TESTING: ICD-10-CM

## 2024-02-09 DIAGNOSIS — R73.9 ELEVATED BLOOD SUGAR: ICD-10-CM

## 2024-02-09 DIAGNOSIS — E03.9 HYPOTHYROIDISM, UNSPECIFIED TYPE: ICD-10-CM

## 2024-02-09 LAB
ANION GAP SERPL CALC-SCNC: 13 MMOL/L (ref 10–20)
BUN SERPL-MCNC: 17 MG/DL (ref 6–23)
CALCIUM SERPL-MCNC: 9.3 MG/DL (ref 8.6–10.3)
CHLORIDE SERPL-SCNC: 106 MMOL/L (ref 98–107)
CO2 SERPL-SCNC: 27 MMOL/L (ref 21–32)
CREAT SERPL-MCNC: 0.83 MG/DL (ref 0.5–1.05)
EGFRCR SERPLBLD CKD-EPI 2021: 74 ML/MIN/1.73M*2
ERYTHROCYTE [DISTWIDTH] IN BLOOD BY AUTOMATED COUNT: 13.3 % (ref 11.5–14.5)
EST. AVERAGE GLUCOSE BLD GHB EST-MCNC: 117 MG/DL
GLUCOSE SERPL-MCNC: 94 MG/DL (ref 74–99)
HBA1C MFR BLD: 5.7 %
HCT VFR BLD AUTO: 40.9 % (ref 36–46)
HGB BLD-MCNC: 13.2 G/DL (ref 12–16)
MCH RBC QN AUTO: 29.1 PG (ref 26–34)
MCHC RBC AUTO-ENTMCNC: 32.3 G/DL (ref 32–36)
MCV RBC AUTO: 90 FL (ref 80–100)
NRBC BLD-RTO: NORMAL /100{WBCS}
PLATELET # BLD AUTO: 232 X10*3/UL (ref 150–450)
POTASSIUM SERPL-SCNC: 3.8 MMOL/L (ref 3.5–5.3)
RBC # BLD AUTO: 4.54 X10*6/UL (ref 4–5.2)
SODIUM SERPL-SCNC: 142 MMOL/L (ref 136–145)
TSH SERPL DL<=0.05 MIU/L-ACNC: 3.18 MIU/L (ref 0.27–4.2)
WBC # BLD AUTO: 5.8 X10*3/UL (ref 4.4–11.3)

## 2024-02-09 PROCEDURE — 83036 HEMOGLOBIN GLYCOSYLATED A1C: CPT

## 2024-02-09 PROCEDURE — 80048 BASIC METABOLIC PNL TOTAL CA: CPT

## 2024-02-09 PROCEDURE — 93010 ELECTROCARDIOGRAM REPORT: CPT | Performed by: INTERNAL MEDICINE

## 2024-02-09 PROCEDURE — 87081 CULTURE SCREEN ONLY: CPT

## 2024-02-09 PROCEDURE — 36415 COLL VENOUS BLD VENIPUNCTURE: CPT

## 2024-02-09 PROCEDURE — 99204 OFFICE O/P NEW MOD 45 MIN: CPT | Performed by: NURSE PRACTITIONER

## 2024-02-09 PROCEDURE — 93005 ELECTROCARDIOGRAM TRACING: CPT

## 2024-02-09 PROCEDURE — 84443 ASSAY THYROID STIM HORMONE: CPT

## 2024-02-09 PROCEDURE — 85027 COMPLETE CBC AUTOMATED: CPT

## 2024-02-09 RX ORDER — CALCIUM CARBONATE 300MG(750)
400 TABLET,CHEWABLE ORAL DAILY
COMMUNITY

## 2024-02-09 RX ORDER — IBUPROFEN 200 MG
400 TABLET ORAL EVERY 6 HOURS PRN
COMMUNITY
End: 2024-02-20 | Stop reason: HOSPADM

## 2024-02-09 ASSESSMENT — PAIN SCALES - GENERAL: PAINLEVEL_OUTOF10: 8

## 2024-02-09 ASSESSMENT — PAIN - FUNCTIONAL ASSESSMENT: PAIN_FUNCTIONAL_ASSESSMENT: 0-10

## 2024-02-09 ASSESSMENT — PAIN DESCRIPTION - DESCRIPTORS: DESCRIPTORS: ACHING

## 2024-02-09 NOTE — PREPROCEDURE INSTRUCTIONS
Medication List            Accurate as of February 9, 2024  3:41 PM. Always use your most recent med list.                CALCIUM 500 ORAL  Medication Adjustments for Surgery: Stop 7 days before surgery     chlorhexidine 0.12 % solution  Commonly known as: Peridex  Medication Adjustments for Surgery: Other (Comment)  Notes to patient: Take night before and morning of surgery     cholecalciferol 125 MCG (5000 UT) capsule  Commonly known as: Vitamin D-3  Medication Adjustments for Surgery: Stop 7 days before surgery     estradiol 0.01 % (0.1 mg/gram) vaginal cream  Commonly known as: Estrace  Medication Adjustments for Surgery: Stop 1 day before surgery     ibuprofen 200 mg tablet  Medication Adjustments for Surgery: Stop 7 days before surgery     magnesium oxide 400 mg tablet  Commonly known as: Mag-Ox  Medication Adjustments for Surgery: Stop 7 days before surgery     omeprazole 20 mg DR capsule  Commonly known as: PriLOSEC     THYROID ORAL  Medication Adjustments for Surgery: Take morning of surgery with sip of water, no other fluids                              NPO Instructions:    Do not eat any food after midnight the night before your surgery/procedure.    Additional Instructions:     Seven/Six Days before Surgery:  Review your medication instructions, stop indicated medications  Five Days before Surgery:  Review your medication instructions, stop indicated medications  Three Days before Surgery:  Review your medication instructions, stop indicated medications  The Day before Surgery:  Review your medication instructions, stop indicated medications  You will be contacted regarding the time of your arrival to facility and surgery time  Do not eat any food after Midnight  Day of Surgery:  Review your medication instructions, take indicated medications  Wear  comfortable loose fitting clothing  Do not use moisturizers, creams, lotions or perfume  All jewelry and valuables should be left at home  PAT DISCHARGE  INSTRUCTIONS    Please call the Same Day Surgery (SDS) Department of the hospital where your procedure will be performed between 2:00- 3:30 PM the day before your surgery. If you are scheduled on a Monday, or a Tuesday following a Monday holiday, you will need to call on the last business day prior to your surgery.    [facility]  [address] [place] [address]  [phone]    Please let your surgeon know if:      You develop any open sores, shingles, burning or painful urination as these may increase your risk of an infection.   You no longer wish to have the surgery.   Any other personal circumstances change that may lead to the need to cancel or defer this surgery-such as being sick or getting admitted to any hospital within one week of your planned procedure.    Your contact details change, such as a change of address or phone number.    Starting now:     Please DO NOT drink alcohol or smoke for 24 hours before surgery. It is well known that quitting smoking can make a huge difference to your health and recovery from surgery. The longer you abstain from smoking, the better your chances of a healthy recovery. If you need help with quitting, call 4-822-QUIT-NOW to be connected to a trained counselor who will discuss the best methods to help you quit.     Before your surgery:    Please stop all supplements 7 days prior to surgery. Or as directed by your surgeon.   Please stop taking NSAID pain medicine such as Advil and Motrin 7 days before surgery.    If you develop any fever, cough, cold, rashes, cuts, scratches, scrapes, urinary symptoms or infection anywhere on your body (including teeth and gums) prior to surgery, please call your surgeon’s office as soon as possible. This may require treatment to reduce the chance of cancellation on the day of surgery.    The day before your surgery:   DIET- Do not eat any food after MIDNIGHT.   Get a good night’s rest.  Use the  special soap for bathing if you have been instructed to use one.    Scheduled surgery times may change and you will be notified if this occurs - please check your personal voicemail for any updates.     On the morning of surgery:   Wear comfortable, loose fitting clothes which open in the front. Please do not wear moisturizers, creams, lotions, makeup or perfume.    Please bring with you to surgery:   Photo ID and insurance card   Current list of medicines and allergies   Pacemaker/ Defibrillator/Heart stent cards   CPAP machine and mask    Slings/ splints/ crutches   A copy of your complete advanced directive/DHPOA.    Please do NOT bring with you to surgery:   All jewelry and valuables should be left at home.   Prosthetic devices such as contact lenses, hearing aids, dentures, eyelash extensions, hairpins and body piercings must be removed prior to going in to the surgical suite.    After outpatient surgery:   A responsible adult MUST accompany you at the time of discharge and stay with you for 24 hours after your surgery. You may NOT drive yourself home after surgery.    Do not drive, operate machinery, make critical decisions or do activities that require co-ordination or balance until after a night’s sleep.   Do not drink alcoholic beverages for 24 hours.   Instructions for resuming your medications will be provided by your surgeon.    CALL YOUR DOCTOR AFTER SURGERY IF YOU HAVE:     Chills and/or a fever of 101° F or higher.    Redness, swelling, pus or drainage from your surgical wound or a bad smell from the wound.    Lightheadedness, fainting or confusion.    Persistent vomiting (throwing up) and are not able to eat or drink for 12 hours.    Three or more loose, watery bowel movements in 24 hours (diarrhea).   Difficulty or pain while urinating( after non-urological surgery)    Pain and swelling in your legs, especially if it is only on one side.    Difficulty breathing or are breathing faster than normal.     Any new concerning symptoms.      Reviewed pre-op instructions with patient, states understanding and denies further questions at this time.    If you have not received a call regarding your arrival time for surgery by 2pm on the day before surgery, you can call [phone].    Take Care  Reviewed Pre-operative Instruction Sheet.   Nothing to eat  or drink after midnight. No candy, water, gum or mints.  For safety, patient must have responsible licensed  to take you home and stay with you for 24 hours.  Shower. Wear clean comfortable clothing. No lotions or body creams. Leave jewelry and valuables at home.  Notify surgeon of illness or in any changes to health.  Bring any medical equipment with you on day of surgery that surgeon has given.  Please review any medication instructions given prior to surgery.  No further questions at this time.PAT DISCHARGE INSTRUCTIONS    Please call the Same Day Surgery (SDS) Department of the hospital where your procedure will be performed between 2:00- 3:30 PM the day before your surgery. If you are scheduled on a Monday, or a Tuesday following a Monday holiday, you will need to call on the last business day prior to your surgery.    [facility]  [address] [place] [address]  [phone]    Please let your surgeon know if:      You develop any open sores, shingles, burning or painful urination as these may increase your risk of an infection.   You no longer wish to have the surgery.   Any other personal circumstances change that may lead to the need to cancel or defer this surgery-such as being sick or getting admitted to any hospital within one week of your planned procedure.    Your contact details change, such as a change of address or phone number.    Starting now:     Please DO NOT drink alcohol or smoke for 24 hours before surgery. It is well known that quitting smoking can make a huge difference to your health and  recovery from surgery. The longer you abstain from smoking, the better your chances of a healthy recovery. If you need help with quitting, call 1800-QUIT-NOW to be connected to a trained counselor who will discuss the best methods to help you quit.     Before your surgery:    Please stop all supplements 7 days prior to surgery. Or as directed by your surgeon.   Please stop taking NSAID pain medicine such as Advil and Motrin 7 days before surgery.    If you develop any fever, cough, cold, rashes, cuts, scratches, scrapes, urinary symptoms or infection anywhere on your body (including teeth and gums) prior to surgery, please call your surgeon’s office as soon as possible. This may require treatment to reduce the chance of cancellation on the day of surgery.    The day before your surgery:   DIET- Do not eat any food after MIDNIGHT.   Get a good night’s rest.  Use the special soap for bathing if you have been instructed to use one.    Scheduled surgery times may change and you will be notified if this occurs - please check your personal voicemail for any updates.     On the morning of surgery:   Wear comfortable, loose fitting clothes which open in the front. Please do not wear moisturizers, creams, lotions, makeup or perfume.    Please bring with you to surgery:   Photo ID and insurance card   Current list of medicines and allergies   Pacemaker/ Defibrillator/Heart stent cards   CPAP machine and mask    Slings/ splints/ crutches   A copy of your complete advanced directive/DHPOA.    Please do NOT bring with you to surgery:   All jewelry and valuables should be left at home.   Prosthetic devices such as contact lenses, hearing aids, dentures, eyelash extensions, hairpins and body piercings must be removed prior to going in to the surgical suite.    After outpatient surgery:   A responsible adult MUST accompany you at the time of discharge and stay with you for 24 hours after your surgery. You may NOT drive yourself  home after surgery.    Do not drive, operate machinery, make critical decisions or do activities that require co-ordination or balance until after a night’s sleep.   Do not drink alcoholic beverages for 24 hours.   Instructions for resuming your medications will be provided by your surgeon.    CALL YOUR DOCTOR AFTER SURGERY IF YOU HAVE:     Chills and/or a fever of 101° F or higher.    Redness, swelling, pus or drainage from your surgical wound or a bad smell from the wound.    Lightheadedness, fainting or confusion.    Persistent vomiting (throwing up) and are not able to eat or drink for 12 hours.    Three or more loose, watery bowel movements in 24 hours (diarrhea).   Difficulty or pain while urinating( after non-urological surgery)    Pain and swelling in your legs, especially if it is only on one side.    Difficulty breathing or are breathing faster than normal.    Any new concerning symptoms.      Reviewed pre-op instructions with patient, states understanding and denies further questions at this time.    If you have not received a call regarding your arrival time for surgery by 2pm on the day before surgery, you can call [phone].    Take Care Home Preoperative Antibacterial Shower    What is a home preoperative antibacterial shower?  This shower is a way of cleaning the skin with a germ killing solution before surgery. The solution contains chlorhexidine, commonly known as CHG. CHG is a skin cleanser with germ killing ability. Let your doctor know if you are allergic to chlorhexidine.      Why do I need to take a preoperative antibacterial shower?  Skin is not sterile. It is best to try to make your skin as free of germs as possible before surgery. Proper cleansing with a germ killing soap before surgery can lower the number of germs on your skin. This helps to reduce the risk of infection at the surgical site. Following the instructions listed below will help you prepare your skin for surgery.    How do  I use the solution?      Steps: Begin using your CHG soap FIVE DAYS BEFORE your scheduled surgery on __________________________________________________.  First, wash and rinse your hair using the CHG soap. Keep CHG away soap away from ear canals and eyes.  Rinse completely, do not condition. Hair extensions should be removed.  Wash your face with your normal soap and rinse.  Apply the CHG solution to a clean wet washcloth. Turn the water off or move away from the water spray to avoid premature rinsing of the CHG soap as you are applying.  Firmly lather your entire body from neck down. Do not use on face.  Pay special attention to the areas(s) where your incision(s) will be located unless they are on your face.  Avoid scrubbing your skin too hard.  The important point is to have the CHG soap sit on your skin for 3 minutes.  DO NOT wash with regular soap after you have used the CHG soap solution.  Pat yourself dry with a clean, freshly laundered towel.  DO NOT apply powders, deodorants or lotions.  Dress in clean, freshly laundered night clothes.  Be sure to sleep with clean, freshly laundered sheets.  Be aware that CHG will cause stains on fabrics; if you wash them with bleach after use. Rinse your washcloth and other linens that have contact with CHG completely. Use only non-chlorine detergents to launder the items used.  The morning of surgery is the fifth day. Repeat the above steps and dress in clean comfortable clothing.      Who should I call if I have any questions regarding the use of CHG soap?  Call the [facility] at [phone] if you have any questions.   Patient Information: Oral/Dental Rinse    What is oral/dental rinse?  It is a mouthwash. It is a way of cleaning the mouth with a germ killing solution before your surgery. The solution contains chlorhexidine, commonly know as CHG.  It is used inside the mouth to kill bacteria known  as Staphylococcus aureus.  Let your doctor know if you are allergic to chlorhexidine.    Why do I need to use CHG oral/dental rinse?  The CHG oral/dental rinse helps to kill bacteria in your mouth known as Staphylococcus aureus. This reduces the risk of infection at the surgical site.    Using your CHG oral/dental rinse.  STEPS: use your CHG oral/dental rinse after you brush your teeth the night before (at bedtime) and the morning of your surgery. Follow the directions on your prescription label.  Use the cap on the container to measure 15ml (fill cap to fill line)  Swish (gargle if you can) the mouthwash in your mouth for at least 30 seconds, (do not swallow) spit out.  After you use your CHG rinse, do not rinse your mouth with water, drink or eat. Please refer to prescription label for the appropriate time to resume oral intake.    What side effects might I have using the CHG oral/dental rinse?  CHG rinse will stick to the plaque on the teeth. Brush and floss just before use. Teeth brushing will help avoid staining of plaque during use.    Who should I contact if I have questions about the CHG oral/dental rinse?  Please call [facility] at [phone] if you have any questions.

## 2024-02-09 NOTE — CPM/PAT H&P
CPM/PAT Evaluation       Name: Victorina Velez (Victorina Velez)  /Age: 1950/73 y.o.     In-Person       Chief Complaint: left hip pain    Patient is a 72 y/o alert and oriented female coming in for PAT for a left Hip Replacement Total [employer] scheduled on 24. Patient denies Cx pain, SOB, HA, and NVDC. Patient also denies Hx DVT/PE. Current medications were reviewed and a presurgical medication schedule was provided. She has no questions at this time.     Past Medical History:   Diagnosis Date    Breast cancer (CMS/[facility])     Chronic rhinitis 2019    Chronic rhinitis    Dysphonia 2021    Hoarseness    Dyspnea, unspecified 2016    Acute dyspnea    Encounter for other preprocedural examination 2016    Preoperative testing    Familial hypercholesterolemia 2018    Essential familial hypercholesterolemia    Fracture of unspecified metatarsal bone(s), unspecified foot, initial encounter for closed fracture 2014    Metatarsal bone fracture    Malignant neoplasm of unspecified part of unspecified bronchus or lung (CMS/[facility]) 2019    Adenocarcinoma of lung    Other abnormal and inconclusive findings on diagnostic imaging of breast 2019    Abnormal finding on breast imaging    Other specified disorders of ear, bilateral 2015    Blocked ear, bilateral    Personal history of irradiation     Personal history of other diseases of the respiratory system 2016    History of bronchitis    Personal history of other endocrine, nutritional and metabolic disease 2020    History of hypothyroidism    Personal history of pneumonia (recurrent) 2014    History of pneumonia    Personal history of urinary (tract) infections 2016    History of urinary tract infection    Pleurodynia 2015    Chest pain, pleuritic    Unspecified injury of unspecified foot, initial encounter 2014    Foot injury    Unspecified symptoms and signs  involving the genitourinary system 10/26/2017    UTI symptoms    Urinary tract infection, site not specified 02/23/2018    Acute lower UTI (urinary tract infection)    Urinary tract infection, site not specified     Acute lower UTI (urinary tract infection)       Past Surgical History:   Procedure Laterality Date    BREAST LUMPECTOMY  08/28/2018    Left Breast Lumpectomy    HIP SURGERY  02/21/2017    Hip Surgery    LUNG LOBECTOMY  08/28/2018    Lung Lobectomy    OTHER SURGICAL HISTORY  02/09/2016    Open Treatment Of Fractures Of Both Radial And Ulnar Shafts       Patient  has no history on file for sexual activity.    Family History   Problem Relation Name Age of Onset    Breast cancer Mother         Allergies   Allergen Reactions    Adhesive Tape-Silicones Rash     Medication Documentation Review Audit       Reviewed by Ciara Orozco RN (Registered Nurse) on 02/09/24 at 1538      Medication Order Taking? Sig Documenting Provider Last Dose Status     Discontinued 02/09/24 1533   Discontinued 02/09/24 1532     Discontinued 02/09/24 1531   calcium carbonate (CALCIUM 500 ORAL) [id number] Yes Take 1 capsule by mouth 2 times a week. Laura Kessler MD Past Week Active   chlorhexidine (Peridex) 0.12 % solution [id number] No SWISH AND SPIT WITH 15 ML BY MOUTH 2 TIMES A DAY FOR 30 SECONDS Laura Kessler MD Not Taking Active   cholecalciferol (Vitamin D-3) 125 MCG (5000 UT) capsule [id number] Yes Take 1 tablet by mouth once daily. Laura Kessler MD 2/9/2024 Active     Discontinued 02/09/24 1533   estradiol (Estrace) 0.01 % (0.1 mg/gram) vaginal cream [id number] Yes APPLY A PEA-SIZED AMOUNT TO VAGINAL OPENING EVERY MONDAY, WEDNESDAY, AND FRIDAY EVENING. Laura Kessler MD Past Week Active     Discontinued 02/09/24 1534     Discontinued 02/09/24 1534   ibuprofen 200 mg tablet [id number] Yes Take 2 tablets (400 mg) by mouth every 6 hours if needed for mild pain (1 - 3). Laura Kessler MD 2/9/2024  "Active   magnesium oxide (Mag-Ox) 400 mg tablet [id number] Yes Take 1 tablet (400 mg) by mouth once daily. Historical Provider, MD 2/8/2024 Active     Discontinued 02/09/24 1534     Discontinued 02/09/24 1531     Discontinued 02/09/24 1534     Discontinued 02/09/24 1534     Discontinued 02/09/24 1534     Discontinued 02/09/24 1535     Discontinued 02/09/24 1535   omeprazole (PriLOSEC) 20 mg DR capsule [id number] Yes Take 2 capsules (40 mg) by mouth once daily. Historical Provider, MD 2/9/2024 Active   THYROID ORAL [id number] Yes Take 1 Capful by mouth once daily. Plant based for T3-T4- holistic pharmacy Historical Provider, MD 2/9/2024 Active     Discontinued 02/09/24 1537     Discontinued 02/09/24 1537                     Visit Vitals  BP (!) 151/95   Pulse 99   Temp 36.9 °C (98.4 °F) (Temporal)   Resp 16   Ht 1.651 m (5' 5\")   Wt 64.8 kg (142 lb 15.5 oz)   SpO2 96%   BMI 23.79 kg/m²   OB Status Postmenopausal   Smoking Status Never   BSA 1.73 m²      Review of Systems   Constitutional: Negative for chills, decreased appetite, diaphoresis, fever and malaise/fatigue.   Eyes:  Negative for blurred vision and double vision.   Cardiovascular:  Negative for chest pain, claudication, cyanosis, dyspnea on exertion, irregular heartbeat, leg swelling, near-syncope and palpitations.   Respiratory:  Negative for cough, hemoptysis, shortness of breath and wheezing.    Endocrine: Negative for cold intolerance, heat intolerance, polydipsia, polyphagia and polyuria.   Gastrointestinal:  Negative for abdominal pain, constipation, diarrhea, dysphagia, nausea and vomiting.   Genitourinary:  Negative for bladder incontinence, dysuria, hematuria, incomplete emptying, nocturia, pelvic pain and urgency.   Neurological:  Negative for headaches, light-headedness, paresthesias, sensory change and weakness.   Psychiatric/Behavioral:  Negative for altered mental status.       Vitals and nursing note reviewed. Physical exam within normal limits. " "  Constitutional:       Appearance: Normal appearance. She is normal weight.   HENT:      Head: Normocephalic and atraumatic.      Mouth/Throat:      Mouth: Mucous membranes are moist.      Pharynx: Oropharynx is clear.   Eyes:      Extraocular Movements: Extraocular movements intact.      Conjunctiva/sclera: Conjunctivae normal.      Pupils: Pupils are equal, round, and reactive to light.   Cardiovascular:      Rate and Rhythm: Normal rate and regular rhythm.      Pulses: Normal pulses.      Heart sounds: Normal heart sounds.      No audible carotid bruit  Pulmonary:      Effort: Pulmonary effort is normal.      Breath sounds: Normal breath sounds.   Abdominal:      General: Abdomen is flat. Bowel sounds are normal.      Palpations: Abdomen is soft.   Musculoskeletal:      Cervical back: Normal range of motion and neck supple.   Skin:     General: Skin is warm and dry.      Capillary Refill: Capillary refill takes less than 2 seconds.   Neurological:      General: No focal deficit present.      Mental Status: She is alert and oriented to person, place, and time. Mental status is at baseline.   Psychiatric:         Mood and Affect: Mood normal.         Behavior: Behavior normal.         Thought Content: Thought content normal.         Judgment: Judgment normal.     PAT AIRWAY:   Airway:     Mallampati::  II    TM distance::  >3 FB    Neck ROM::  Full    Crowns throughout otherwise intact dentition    NO PERSONAL REPORTS OF REACTIONS TO ANESTHESIA  NO PERSONAL REPORTS OF FAMILY HISTORY OF REACTIONS TO ANESTHESIA  NO PERSONAL REPORTS OF METAL, NICKEL, OR SHELLFISH ALLERGY  Smoked as a teenager for a \"little bit\" and then quit as a teenager  NO DRUGS   1 alcoholic drink per week    ASA II  RCRI-0 POINTS CLASS I RISK 3.9%  STOP-BANGS- 2 POINTS LOW RISK FOR NELSON  NSQIP-PREDICTED LENGTH OF STAY 1 DAYS  ARISCAT-3 POINTS LOW RISK 1.6%  DASI-42.7 POINTS. 7.99 METS  DON-0.1%  JHFRAT-2 POINTS HIGH RISK FOR " FALLS      Assessment and Plan:   Left hip pain  Left Hip Replacement Total [employer] scheduled on 2/19/24.   Hold NSAIDS  Hold herbal supplements  Abstain from ETOH  PAT TESTING-CBC, BMP, MRSA, A1C, TSH  EKG showed: NSR @ 94  CHLORHEXIDINE .12% DENTAL RINSE E-PRESCRIBED PER  INFECTION PREVENTION PROTOCOL. PATIENT EDUCATED.  *CLEARED FOR SURGERY PENDING LABS/EKG   *FACE TO FACE TIME 20 MINUTES.       Hypothyroidism   TSH ordered  Continue medications as ordered      GERD  Continue medications as ordered

## 2024-02-09 NOTE — H&P (VIEW-ONLY)
CPM/PAT Evaluation       Name: Victorina Velez (Victorina Velez)  /Age: 1950/73 y.o.     In-Person       Chief Complaint: left hip pain    Patient is a 74 y/o alert and oriented female coming in for PAT for a left Hip Replacement Total [employer] scheduled on 24. Patient denies Cx pain, SOB, HA, and NVDC. Patient also denies Hx DVT/PE. Current medications were reviewed and a presurgical medication schedule was provided. She has no questions at this time.     Past Medical History:   Diagnosis Date    Breast cancer (CMS/[facility])     Chronic rhinitis 2019    Chronic rhinitis    Dysphonia 2021    Hoarseness    Dyspnea, unspecified 2016    Acute dyspnea    Encounter for other preprocedural examination 2016    Preoperative testing    Familial hypercholesterolemia 2018    Essential familial hypercholesterolemia    Fracture of unspecified metatarsal bone(s), unspecified foot, initial encounter for closed fracture 2014    Metatarsal bone fracture    Malignant neoplasm of unspecified part of unspecified bronchus or lung (CMS/[facility]) 2019    Adenocarcinoma of lung    Other abnormal and inconclusive findings on diagnostic imaging of breast 2019    Abnormal finding on breast imaging    Other specified disorders of ear, bilateral 2015    Blocked ear, bilateral    Personal history of irradiation     Personal history of other diseases of the respiratory system 2016    History of bronchitis    Personal history of other endocrine, nutritional and metabolic disease 2020    History of hypothyroidism    Personal history of pneumonia (recurrent) 2014    History of pneumonia    Personal history of urinary (tract) infections 2016    History of urinary tract infection    Pleurodynia 2015    Chest pain, pleuritic    Unspecified injury of unspecified foot, initial encounter 2014    Foot injury    Unspecified symptoms and signs  involving the genitourinary system 10/26/2017    UTI symptoms    Urinary tract infection, site not specified 02/23/2018    Acute lower UTI (urinary tract infection)    Urinary tract infection, site not specified     Acute lower UTI (urinary tract infection)       Past Surgical History:   Procedure Laterality Date    BREAST LUMPECTOMY  08/28/2018    Left Breast Lumpectomy    HIP SURGERY  02/21/2017    Hip Surgery    LUNG LOBECTOMY  08/28/2018    Lung Lobectomy    OTHER SURGICAL HISTORY  02/09/2016    Open Treatment Of Fractures Of Both Radial And Ulnar Shafts       Patient  has no history on file for sexual activity.    Family History   Problem Relation Name Age of Onset    Breast cancer Mother         Allergies   Allergen Reactions    Adhesive Tape-Silicones Rash     Medication Documentation Review Audit       Reviewed by Ciara Orozco RN (Registered Nurse) on 02/09/24 at 1538      Medication Order Taking? Sig Documenting Provider Last Dose Status     Discontinued 02/09/24 1533   Discontinued 02/09/24 1532     Discontinued 02/09/24 1531   calcium carbonate (CALCIUM 500 ORAL) [id number] Yes Take 1 capsule by mouth 2 times a week. Laura Kessler MD Past Week Active   chlorhexidine (Peridex) 0.12 % solution [id number] No SWISH AND SPIT WITH 15 ML BY MOUTH 2 TIMES A DAY FOR 30 SECONDS Laura Kessler MD Not Taking Active   cholecalciferol (Vitamin D-3) 125 MCG (5000 UT) capsule [id number] Yes Take 1 tablet by mouth once daily. Laura Kessler MD 2/9/2024 Active     Discontinued 02/09/24 1533   estradiol (Estrace) 0.01 % (0.1 mg/gram) vaginal cream [id number] Yes APPLY A PEA-SIZED AMOUNT TO VAGINAL OPENING EVERY MONDAY, WEDNESDAY, AND FRIDAY EVENING. Laura Kessler MD Past Week Active     Discontinued 02/09/24 1534     Discontinued 02/09/24 1534   ibuprofen 200 mg tablet [id number] Yes Take 2 tablets (400 mg) by mouth every 6 hours if needed for mild pain (1 - 3). Laura Kessler MD 2/9/2024  "Active   magnesium oxide (Mag-Ox) 400 mg tablet [id number] Yes Take 1 tablet (400 mg) by mouth once daily. Historical Provider, MD 2/8/2024 Active     Discontinued 02/09/24 1534     Discontinued 02/09/24 1531     Discontinued 02/09/24 1534     Discontinued 02/09/24 1534     Discontinued 02/09/24 1534     Discontinued 02/09/24 1535     Discontinued 02/09/24 1535   omeprazole (PriLOSEC) 20 mg DR capsule [id number] Yes Take 2 capsules (40 mg) by mouth once daily. Historical Provider, MD 2/9/2024 Active   THYROID ORAL [id number] Yes Take 1 Capful by mouth once daily. Plant based for T3-T4- holistic pharmacy Historical Provider, MD 2/9/2024 Active     Discontinued 02/09/24 1537     Discontinued 02/09/24 1537                     Visit Vitals  BP (!) 151/95   Pulse 99   Temp 36.9 °C (98.4 °F) (Temporal)   Resp 16   Ht 1.651 m (5' 5\")   Wt 64.8 kg (142 lb 15.5 oz)   SpO2 96%   BMI 23.79 kg/m²   OB Status Postmenopausal   Smoking Status Never   BSA 1.73 m²      Review of Systems   Constitutional: Negative for chills, decreased appetite, diaphoresis, fever and malaise/fatigue.   Eyes:  Negative for blurred vision and double vision.   Cardiovascular:  Negative for chest pain, claudication, cyanosis, dyspnea on exertion, irregular heartbeat, leg swelling, near-syncope and palpitations.   Respiratory:  Negative for cough, hemoptysis, shortness of breath and wheezing.    Endocrine: Negative for cold intolerance, heat intolerance, polydipsia, polyphagia and polyuria.   Gastrointestinal:  Negative for abdominal pain, constipation, diarrhea, dysphagia, nausea and vomiting.   Genitourinary:  Negative for bladder incontinence, dysuria, hematuria, incomplete emptying, nocturia, pelvic pain and urgency.   Neurological:  Negative for headaches, light-headedness, paresthesias, sensory change and weakness.   Psychiatric/Behavioral:  Negative for altered mental status.       Vitals and nursing note reviewed. Physical exam within normal limits. " "  Constitutional:       Appearance: Normal appearance. She is normal weight.   HENT:      Head: Normocephalic and atraumatic.      Mouth/Throat:      Mouth: Mucous membranes are moist.      Pharynx: Oropharynx is clear.   Eyes:      Extraocular Movements: Extraocular movements intact.      Conjunctiva/sclera: Conjunctivae normal.      Pupils: Pupils are equal, round, and reactive to light.   Cardiovascular:      Rate and Rhythm: Normal rate and regular rhythm.      Pulses: Normal pulses.      Heart sounds: Normal heart sounds.      No audible carotid bruit  Pulmonary:      Effort: Pulmonary effort is normal.      Breath sounds: Normal breath sounds.   Abdominal:      General: Abdomen is flat. Bowel sounds are normal.      Palpations: Abdomen is soft.   Musculoskeletal:      Cervical back: Normal range of motion and neck supple.   Skin:     General: Skin is warm and dry.      Capillary Refill: Capillary refill takes less than 2 seconds.   Neurological:      General: No focal deficit present.      Mental Status: She is alert and oriented to person, place, and time. Mental status is at baseline.   Psychiatric:         Mood and Affect: Mood normal.         Behavior: Behavior normal.         Thought Content: Thought content normal.         Judgment: Judgment normal.     PAT AIRWAY:   Airway:     Mallampati::  II    TM distance::  >3 FB    Neck ROM::  Full    Crowns throughout otherwise intact dentition    NO PERSONAL REPORTS OF REACTIONS TO ANESTHESIA  NO PERSONAL REPORTS OF FAMILY HISTORY OF REACTIONS TO ANESTHESIA  NO PERSONAL REPORTS OF METAL, NICKEL, OR SHELLFISH ALLERGY  Smoked as a teenager for a \"little bit\" and then quit as a teenager  NO DRUGS   1 alcoholic drink per week    ASA II  RCRI-0 POINTS CLASS I RISK 3.9%  STOP-BANGS- 2 POINTS LOW RISK FOR NELSON  NSQIP-PREDICTED LENGTH OF STAY 1 DAYS  ARISCAT-3 POINTS LOW RISK 1.6%  DASI-42.7 POINTS. 7.99 METS  DON-0.1%  JHFRAT-2 POINTS HIGH RISK FOR " FALLS      Assessment and Plan:   Left hip pain  Left Hip Replacement Total [employer]plants scheduled on 2/19/24.   Hold NSAIDS  Hold herbal supplements  Abstain from ETOH  PAT TESTING-CBC, BMP, MRSA, A1C, TSH  EKG showed: NSR @ 94  CHLORHEXIDINE .12% DENTAL RINSE E-PRESCRIBED PER  INFECTION PREVENTION PROTOCOL. PATIENT EDUCATED.  *CLEARED FOR SURGERY PENDING LABS/EKG   *FACE TO FACE TIME 20 MINUTES.       Hypothyroidism   TSH ordered  Continue medications as ordered      GERD  Continue medications as ordered

## 2024-02-11 LAB — STAPHYLOCOCCUS SPEC CULT: NORMAL

## 2024-02-12 ENCOUNTER — TELEPHONE (OUTPATIENT)
Dept: ORTHOPEDIC SURGERY | Facility: HOSPITAL | Age: 74
End: 2024-02-12
Payer: MEDICARE

## 2024-02-12 NOTE — TELEPHONE ENCOUNTER
Thank you for calling today.  We were able to discuss several questions that you had.  A message was sent to the nurse practitioner who saw you at preadmission testing, she has been asked to send the CHG mouthwash to your pharmacy on file.  You can confirm with the pharmacy prior to picking up if this is the same as what you have at home.    You will be receiving your discharge prescriptions prior to your discharge from the hospital.  Our pharmacist will provide you with instructions on the medications you go home with.    You do not need to obtain any additional xrays prior to your surgery.    Please don't hesitate to reach out if you have any additional questions or concerns.    Arti Stallings MBA, BSN, RN-BC  Orthopedic   [facility] [phone]

## 2024-02-14 RX ORDER — CHLORHEXIDINE GLUCONATE ORAL RINSE 1.2 MG/ML
15 SOLUTION DENTAL AS NEEDED
Qty: 30 ML | Refills: 0 | Status: SHIPPED | OUTPATIENT
Start: 2024-02-14 | End: 2024-02-20 | Stop reason: HOSPADM

## 2024-02-15 ENCOUNTER — ANESTHESIA EVENT (OUTPATIENT)
Dept: OPERATING ROOM | Facility: HOSPITAL | Age: 74
End: 2024-02-15
Payer: MEDICARE

## 2024-02-15 LAB
ATRIAL RATE: 94 BPM
P AXIS: 24 DEGREES
P OFFSET: 180 MS
P ONSET: 128 MS
PR INTERVAL: 170 MS
Q ONSET: 213 MS
QRS COUNT: 16 BEATS
QRS DURATION: 80 MS
QT INTERVAL: 362 MS
QTC CALCULATION(BAZETT): 452 MS
QTC FREDERICIA: 420 MS
R AXIS: -11 DEGREES
T AXIS: 20 DEGREES
T OFFSET: 394 MS
VENTRICULAR RATE: 94 BPM

## 2024-02-19 ENCOUNTER — APPOINTMENT (OUTPATIENT)
Dept: RADIOLOGY | Facility: HOSPITAL | Age: 74
End: 2024-02-19
Payer: MEDICARE

## 2024-02-19 ENCOUNTER — ANESTHESIA (OUTPATIENT)
Dept: OPERATING ROOM | Facility: HOSPITAL | Age: 74
End: 2024-02-19
Payer: MEDICARE

## 2024-02-19 ENCOUNTER — PHARMACY VISIT (OUTPATIENT)
Dept: PHARMACY | Facility: CLINIC | Age: 74
End: 2024-02-19
Payer: COMMERCIAL

## 2024-02-19 ENCOUNTER — HOSPITAL ENCOUNTER (OUTPATIENT)
Facility: HOSPITAL | Age: 74
Discharge: HOME | End: 2024-02-20
Attending: ORTHOPAEDIC SURGERY | Admitting: ORTHOPAEDIC SURGERY
Payer: MEDICARE

## 2024-02-19 DIAGNOSIS — Z96.642 STATUS POST LEFT HIP REPLACEMENT: ICD-10-CM

## 2024-02-19 DIAGNOSIS — Z96.641 STATUS POST RIGHT HIP REPLACEMENT: ICD-10-CM

## 2024-02-19 DIAGNOSIS — G89.18 ACUTE POSTOPERATIVE PAIN: ICD-10-CM

## 2024-02-19 DIAGNOSIS — M16.12 UNILATERAL PRIMARY OSTEOARTHRITIS, LEFT HIP: Primary | ICD-10-CM

## 2024-02-19 PROCEDURE — 2500000005 HC RX 250 GENERAL PHARMACY W/O HCPCS: Performed by: ANESTHESIOLOGIST ASSISTANT

## 2024-02-19 PROCEDURE — 3600000010 HC OR TIME - EACH INCREMENTAL 1 MINUTE - PROCEDURE LEVEL FIVE: Performed by: ORTHOPAEDIC SURGERY

## 2024-02-19 PROCEDURE — C1776 JOINT DEVICE (IMPLANTABLE): HCPCS | Performed by: ORTHOPAEDIC SURGERY

## 2024-02-19 PROCEDURE — 72170 X-RAY EXAM OF PELVIS: CPT

## 2024-02-19 PROCEDURE — C1713 ANCHOR/SCREW BN/BN,TIS/BN: HCPCS | Performed by: ORTHOPAEDIC SURGERY

## 2024-02-19 PROCEDURE — 2500000001 HC RX 250 WO HCPCS SELF ADMINISTERED DRUGS (ALT 637 FOR MEDICARE OP)

## 2024-02-19 PROCEDURE — 2500000004 HC RX 250 GENERAL PHARMACY W/ HCPCS (ALT 636 FOR OP/ED): Performed by: PHYSICIAN ASSISTANT

## 2024-02-19 PROCEDURE — 2500000004 HC RX 250 GENERAL PHARMACY W/ HCPCS (ALT 636 FOR OP/ED): Performed by: ANESTHESIOLOGIST ASSISTANT

## 2024-02-19 PROCEDURE — RXMED WILLOW AMBULATORY MEDICATION CHARGE

## 2024-02-19 PROCEDURE — 2500000004 HC RX 250 GENERAL PHARMACY W/ HCPCS (ALT 636 FOR OP/ED)

## 2024-02-19 PROCEDURE — 2780000003 HC OR 278 NO HCPCS: Performed by: ORTHOPAEDIC SURGERY

## 2024-02-19 PROCEDURE — 3600000005 HC OR TIME - INITIAL BASE CHARGE - PROCEDURE LEVEL FIVE: Performed by: ORTHOPAEDIC SURGERY

## 2024-02-19 PROCEDURE — A4649 SURGICAL SUPPLIES: HCPCS | Performed by: ORTHOPAEDIC SURGERY

## 2024-02-19 PROCEDURE — 2720000007 HC OR 272 NO HCPCS: Performed by: ORTHOPAEDIC SURGERY

## 2024-02-19 PROCEDURE — 94762 N-INVAS EAR/PLS OXIMTRY CONT: CPT

## 2024-02-19 PROCEDURE — 3700000002 HC GENERAL ANESTHESIA TIME - EACH INCREMENTAL 1 MINUTE: Performed by: ORTHOPAEDIC SURGERY

## 2024-02-19 PROCEDURE — 27130 TOTAL HIP ARTHROPLASTY: CPT | Performed by: ORTHOPAEDIC SURGERY

## 2024-02-19 PROCEDURE — 7100000002 HC RECOVERY ROOM TIME - EACH INCREMENTAL 1 MINUTE: Performed by: ORTHOPAEDIC SURGERY

## 2024-02-19 PROCEDURE — A27130 PR TOTAL HIP ARTHROPLASTY: Performed by: ANESTHESIOLOGIST ASSISTANT

## 2024-02-19 PROCEDURE — 2500000004 HC RX 250 GENERAL PHARMACY W/ HCPCS (ALT 636 FOR OP/ED): Performed by: EMERGENCY MEDICINE

## 2024-02-19 PROCEDURE — 2500000005 HC RX 250 GENERAL PHARMACY W/O HCPCS: Performed by: ORTHOPAEDIC SURGERY

## 2024-02-19 PROCEDURE — 2500000005 HC RX 250 GENERAL PHARMACY W/O HCPCS: Performed by: PHYSICIAN ASSISTANT

## 2024-02-19 PROCEDURE — A27130 PR TOTAL HIP ARTHROPLASTY: Performed by: ANESTHESIOLOGY

## 2024-02-19 PROCEDURE — G0378 HOSPITAL OBSERVATION PER HR: HCPCS

## 2024-02-19 PROCEDURE — 2500000001 HC RX 250 WO HCPCS SELF ADMINISTERED DRUGS (ALT 637 FOR MEDICARE OP): Performed by: PHYSICIAN ASSISTANT

## 2024-02-19 PROCEDURE — 7100000001 HC RECOVERY ROOM TIME - INITIAL BASE CHARGE: Performed by: ORTHOPAEDIC SURGERY

## 2024-02-19 PROCEDURE — 99100 ANES PT EXTEME AGE<1 YR&>70: CPT | Performed by: ANESTHESIOLOGY

## 2024-02-19 PROCEDURE — 3700000001 HC GENERAL ANESTHESIA TIME - INITIAL BASE CHARGE: Performed by: ORTHOPAEDIC SURGERY

## 2024-02-19 DEVICE — PINNACLE CANCELLOUS BONE SCREW 6.5MM X 30MM
Type: IMPLANTABLE DEVICE | Site: HIP | Status: FUNCTIONAL
Brand: PINNACLE

## 2024-02-19 DEVICE — PINNACLE HIP SOLUTIONS ALTRX POLYETHYLENE ACETABULAR LINER NEUTRAL 32MM ID 50MM OD
Type: IMPLANTABLE DEVICE | Site: HIP | Status: FUNCTIONAL
Brand: PINNACLE ALTRX

## 2024-02-19 DEVICE — PINNACLE GRIPTION ACETABULAR SHELL SECTOR 50MM OD
Type: IMPLANTABLE DEVICE | Site: HIP | Status: FUNCTIONAL
Brand: PINNACLE GRIPTION

## 2024-02-19 DEVICE — SUMMIT FEMORAL STEM 12/14 TAPER TAPER ED W/POROCOAT SIZE 4 STD 140MM
Type: IMPLANTABLE DEVICE | Site: HIP | Status: FUNCTIONAL
Brand: SUMMIT POROCOAT

## 2024-02-19 DEVICE — BIOLOX DELTA CERAMIC FEMORAL HEAD 32MM DIA +9 12/14 TAPER
Type: IMPLANTABLE DEVICE | Site: HIP | Status: FUNCTIONAL
Brand: BIOLOX DELTA

## 2024-02-19 RX ORDER — METOCLOPRAMIDE HYDROCHLORIDE 5 MG/ML
10 INJECTION INTRAMUSCULAR; INTRAVENOUS EVERY 6 HOURS PRN
Status: DISCONTINUED | OUTPATIENT
Start: 2024-02-19 | End: 2024-02-20 | Stop reason: HOSPADM

## 2024-02-19 RX ORDER — HYDROMORPHONE HYDROCHLORIDE 1 MG/ML
1 INJECTION, SOLUTION INTRAMUSCULAR; INTRAVENOUS; SUBCUTANEOUS EVERY 2 HOUR PRN
Status: DISCONTINUED | OUTPATIENT
Start: 2024-02-19 | End: 2024-02-20 | Stop reason: HOSPADM

## 2024-02-19 RX ORDER — HYDRALAZINE HYDROCHLORIDE 20 MG/ML
5 INJECTION INTRAMUSCULAR; INTRAVENOUS EVERY 30 MIN PRN
Status: DISCONTINUED | OUTPATIENT
Start: 2024-02-19 | End: 2024-02-19 | Stop reason: HOSPADM

## 2024-02-19 RX ORDER — DIPHENHYDRAMINE HCL 12.5MG/5ML
12.5 LIQUID (ML) ORAL EVERY 6 HOURS PRN
Status: DISCONTINUED | OUTPATIENT
Start: 2024-02-19 | End: 2024-02-20 | Stop reason: HOSPADM

## 2024-02-19 RX ORDER — ACETAMINOPHEN 325 MG/1
325 TABLET ORAL EVERY 6 HOURS PRN
COMMUNITY
End: 2024-02-20 | Stop reason: HOSPADM

## 2024-02-19 RX ORDER — NALOXONE HYDROCHLORIDE 0.4 MG/ML
0.2 INJECTION, SOLUTION INTRAMUSCULAR; INTRAVENOUS; SUBCUTANEOUS EVERY 5 MIN PRN
Status: DISCONTINUED | OUTPATIENT
Start: 2024-02-19 | End: 2024-02-20 | Stop reason: HOSPADM

## 2024-02-19 RX ORDER — BISACODYL 10 MG/1
10 SUPPOSITORY RECTAL DAILY PRN
Status: DISCONTINUED | OUTPATIENT
Start: 2024-02-19 | End: 2024-02-20 | Stop reason: HOSPADM

## 2024-02-19 RX ORDER — SODIUM CHLORIDE, SODIUM LACTATE, POTASSIUM CHLORIDE, CALCIUM CHLORIDE 600; 310; 30; 20 MG/100ML; MG/100ML; MG/100ML; MG/100ML
100 INJECTION, SOLUTION INTRAVENOUS CONTINUOUS
Status: DISCONTINUED | OUTPATIENT
Start: 2024-02-19 | End: 2024-02-20 | Stop reason: HOSPADM

## 2024-02-19 RX ORDER — KETOROLAC TROMETHAMINE 15 MG/ML
15 INJECTION, SOLUTION INTRAMUSCULAR; INTRAVENOUS EVERY 6 HOURS
Status: DISCONTINUED | OUTPATIENT
Start: 2024-02-19 | End: 2024-02-20 | Stop reason: HOSPADM

## 2024-02-19 RX ORDER — CYCLOBENZAPRINE HCL 10 MG
5 TABLET ORAL 3 TIMES DAILY PRN
Status: DISCONTINUED | OUTPATIENT
Start: 2024-02-19 | End: 2024-02-20 | Stop reason: HOSPADM

## 2024-02-19 RX ORDER — POLYETHYLENE GLYCOL 3350 17 G/17G
17 POWDER, FOR SOLUTION ORAL DAILY
Status: DISCONTINUED | OUTPATIENT
Start: 2024-02-19 | End: 2024-02-20 | Stop reason: HOSPADM

## 2024-02-19 RX ORDER — ACETAMINOPHEN 500 MG
1000 TABLET ORAL EVERY 6 HOURS PRN
Qty: 240 TABLET | Refills: 0 | Status: SHIPPED | OUTPATIENT
Start: 2024-02-19 | End: 2024-03-20

## 2024-02-19 RX ORDER — ALBUTEROL SULFATE 0.83 MG/ML
2.5 SOLUTION RESPIRATORY (INHALATION) ONCE AS NEEDED
Status: DISCONTINUED | OUTPATIENT
Start: 2024-02-19 | End: 2024-02-19 | Stop reason: HOSPADM

## 2024-02-19 RX ORDER — TRANEXAMIC ACID 100 MG/ML
INJECTION, SOLUTION INTRAVENOUS AS NEEDED
Status: DISCONTINUED | OUTPATIENT
Start: 2024-02-19 | End: 2024-02-19

## 2024-02-19 RX ORDER — OXYCODONE HYDROCHLORIDE 5 MG/1
5 TABLET ORAL EVERY 4 HOURS PRN
Status: DISCONTINUED | OUTPATIENT
Start: 2024-02-19 | End: 2024-02-20 | Stop reason: HOSPADM

## 2024-02-19 RX ORDER — CEFAZOLIN SODIUM 2 G/100ML
2 INJECTION, SOLUTION INTRAVENOUS ONCE
Status: COMPLETED | OUTPATIENT
Start: 2024-02-19 | End: 2024-02-19

## 2024-02-19 RX ORDER — SODIUM CHLORIDE, SODIUM LACTATE, POTASSIUM CHLORIDE, CALCIUM CHLORIDE 600; 310; 30; 20 MG/100ML; MG/100ML; MG/100ML; MG/100ML
100 INJECTION, SOLUTION INTRAVENOUS CONTINUOUS
Status: DISCONTINUED | OUTPATIENT
Start: 2024-02-19 | End: 2024-02-19 | Stop reason: HOSPADM

## 2024-02-19 RX ORDER — ACETAMINOPHEN 325 MG/1
650 TABLET ORAL EVERY 6 HOURS PRN
Status: DISCONTINUED | OUTPATIENT
Start: 2024-02-19 | End: 2024-02-20 | Stop reason: HOSPADM

## 2024-02-19 RX ORDER — PANTOPRAZOLE SODIUM 40 MG/1
40 TABLET, DELAYED RELEASE ORAL DAILY
Qty: 30 TABLET | Refills: 0 | Status: SHIPPED | OUTPATIENT
Start: 2024-02-19 | End: 2024-05-07 | Stop reason: ALTCHOICE

## 2024-02-19 RX ORDER — MIDAZOLAM HYDROCHLORIDE 1 MG/ML
INJECTION, SOLUTION INTRAMUSCULAR; INTRAVENOUS AS NEEDED
Status: DISCONTINUED | OUTPATIENT
Start: 2024-02-19 | End: 2024-02-19

## 2024-02-19 RX ORDER — TALC
3 POWDER (GRAM) TOPICAL NIGHTLY PRN
Status: DISCONTINUED | OUTPATIENT
Start: 2024-02-19 | End: 2024-02-20 | Stop reason: HOSPADM

## 2024-02-19 RX ORDER — BISACODYL 5 MG
10 TABLET, DELAYED RELEASE (ENTERIC COATED) ORAL DAILY PRN
Status: DISCONTINUED | OUTPATIENT
Start: 2024-02-19 | End: 2024-02-20 | Stop reason: HOSPADM

## 2024-02-19 RX ORDER — SIMETHICONE 80 MG
80 TABLET,CHEWABLE ORAL 4 TIMES DAILY PRN
Status: DISCONTINUED | OUTPATIENT
Start: 2024-02-19 | End: 2024-02-20 | Stop reason: HOSPADM

## 2024-02-19 RX ORDER — MELOXICAM 7.5 MG/1
7.5 TABLET ORAL ONCE
Status: COMPLETED | OUTPATIENT
Start: 2024-02-19 | End: 2024-02-19

## 2024-02-19 RX ORDER — LABETALOL HYDROCHLORIDE 5 MG/ML
5 INJECTION, SOLUTION INTRAVENOUS ONCE AS NEEDED
Status: DISCONTINUED | OUTPATIENT
Start: 2024-02-19 | End: 2024-02-19 | Stop reason: HOSPADM

## 2024-02-19 RX ORDER — ONDANSETRON 4 MG/1
4 TABLET, ORALLY DISINTEGRATING ORAL EVERY 8 HOURS PRN
Status: DISCONTINUED | OUTPATIENT
Start: 2024-02-19 | End: 2024-02-20 | Stop reason: HOSPADM

## 2024-02-19 RX ORDER — SCOLOPAMINE TRANSDERMAL SYSTEM 1 MG/1
1 PATCH, EXTENDED RELEASE TRANSDERMAL
Status: DISCONTINUED | OUTPATIENT
Start: 2024-02-19 | End: 2024-02-19

## 2024-02-19 RX ORDER — PANTOPRAZOLE SODIUM 40 MG/1
40 TABLET, DELAYED RELEASE ORAL
Status: DISCONTINUED | OUTPATIENT
Start: 2024-02-20 | End: 2024-02-20 | Stop reason: HOSPADM

## 2024-02-19 RX ORDER — METOCLOPRAMIDE 10 MG/1
10 TABLET ORAL EVERY 6 HOURS PRN
Status: DISCONTINUED | OUTPATIENT
Start: 2024-02-19 | End: 2024-02-20 | Stop reason: HOSPADM

## 2024-02-19 RX ORDER — POLYETHYLENE GLYCOL 3350 17 G/17G
17 POWDER, FOR SOLUTION ORAL DAILY
Qty: 238 G | Refills: 0 | Status: SHIPPED | OUTPATIENT
Start: 2024-02-19 | End: 2024-05-07 | Stop reason: WASHOUT

## 2024-02-19 RX ORDER — DOCUSATE SODIUM 100 MG/1
100 CAPSULE, LIQUID FILLED ORAL 2 TIMES DAILY
Qty: 60 CAPSULE | Refills: 0 | Status: SHIPPED | OUTPATIENT
Start: 2024-02-19 | End: 2024-03-20

## 2024-02-19 RX ORDER — CEFAZOLIN SODIUM 2 G/100ML
2 INJECTION, SOLUTION INTRAVENOUS EVERY 8 HOURS
Status: COMPLETED | OUTPATIENT
Start: 2024-02-19 | End: 2024-02-20

## 2024-02-19 RX ORDER — OXYCODONE HYDROCHLORIDE 5 MG/1
10 TABLET ORAL EVERY 4 HOURS PRN
Status: DISCONTINUED | OUTPATIENT
Start: 2024-02-19 | End: 2024-02-20 | Stop reason: HOSPADM

## 2024-02-19 RX ORDER — OXYCODONE HCL 10 MG/1
10 TABLET, FILM COATED, EXTENDED RELEASE ORAL ONCE
Status: COMPLETED | OUTPATIENT
Start: 2024-02-19 | End: 2024-02-19

## 2024-02-19 RX ORDER — PREGABALIN 75 MG/1
75 CAPSULE ORAL ONCE
Status: COMPLETED | OUTPATIENT
Start: 2024-02-19 | End: 2024-02-19

## 2024-02-19 RX ORDER — DOCUSATE SODIUM 100 MG/1
100 CAPSULE, LIQUID FILLED ORAL 2 TIMES DAILY
Status: DISCONTINUED | OUTPATIENT
Start: 2024-02-19 | End: 2024-02-20 | Stop reason: HOSPADM

## 2024-02-19 RX ORDER — NORETHINDRONE AND ETHINYL ESTRADIOL 0.5-0.035
KIT ORAL AS NEEDED
Status: DISCONTINUED | OUTPATIENT
Start: 2024-02-19 | End: 2024-02-19

## 2024-02-19 RX ORDER — FENTANYL CITRATE 50 UG/ML
INJECTION, SOLUTION INTRAMUSCULAR; INTRAVENOUS AS NEEDED
Status: DISCONTINUED | OUTPATIENT
Start: 2024-02-19 | End: 2024-02-19

## 2024-02-19 RX ORDER — PHENYLEPHRINE HCL IN 0.9% NACL 1 MG/10 ML
SYRINGE (ML) INTRAVENOUS AS NEEDED
Status: DISCONTINUED | OUTPATIENT
Start: 2024-02-19 | End: 2024-02-19

## 2024-02-19 RX ORDER — ONDANSETRON HYDROCHLORIDE 2 MG/ML
INJECTION, SOLUTION INTRAVENOUS AS NEEDED
Status: DISCONTINUED | OUTPATIENT
Start: 2024-02-19 | End: 2024-02-19

## 2024-02-19 RX ORDER — SODIUM CHLORIDE, SODIUM LACTATE, POTASSIUM CHLORIDE, CALCIUM CHLORIDE 600; 310; 30; 20 MG/100ML; MG/100ML; MG/100ML; MG/100ML
75 INJECTION, SOLUTION INTRAVENOUS CONTINUOUS
Status: DISCONTINUED | OUTPATIENT
Start: 2024-02-19 | End: 2024-02-19

## 2024-02-19 RX ORDER — DEXAMETHASONE SODIUM PHOSPHATE 4 MG/ML
INJECTION, SOLUTION INTRA-ARTICULAR; INTRALESIONAL; INTRAMUSCULAR; INTRAVENOUS; SOFT TISSUE AS NEEDED
Status: DISCONTINUED | OUTPATIENT
Start: 2024-02-19 | End: 2024-02-19

## 2024-02-19 RX ORDER — CHOLECALCIFEROL (VITAMIN D3) 25 MCG
2000 TABLET ORAL DAILY
Status: DISCONTINUED | OUTPATIENT
Start: 2024-02-20 | End: 2024-02-20 | Stop reason: HOSPADM

## 2024-02-19 RX ORDER — TRAMADOL HYDROCHLORIDE 50 MG/1
50 TABLET ORAL EVERY 6 HOURS PRN
Qty: 28 TABLET | Refills: 0 | Status: SHIPPED | OUTPATIENT
Start: 2024-02-19 | End: 2024-02-26

## 2024-02-19 RX ORDER — LIDOCAINE HYDROCHLORIDE 10 MG/ML
INJECTION, SOLUTION EPIDURAL; INFILTRATION; INTRACAUDAL; PERINEURAL AS NEEDED
Status: DISCONTINUED | OUTPATIENT
Start: 2024-02-19 | End: 2024-02-19

## 2024-02-19 RX ORDER — ALUMINUM HYDROXIDE, MAGNESIUM HYDROXIDE, AND SIMETHICONE 1200; 120; 1200 MG/30ML; MG/30ML; MG/30ML
20 SUSPENSION ORAL EVERY 4 HOURS PRN
Status: DISCONTINUED | OUTPATIENT
Start: 2024-02-19 | End: 2024-02-20 | Stop reason: HOSPADM

## 2024-02-19 RX ORDER — FENTANYL CITRATE 50 UG/ML
50 INJECTION, SOLUTION INTRAMUSCULAR; INTRAVENOUS EVERY 5 MIN PRN
Status: DISCONTINUED | OUTPATIENT
Start: 2024-02-19 | End: 2024-02-19 | Stop reason: HOSPADM

## 2024-02-19 RX ORDER — OXYCODONE HYDROCHLORIDE 5 MG/1
5 TABLET ORAL EVERY 6 HOURS PRN
Qty: 28 TABLET | Refills: 0 | Status: SHIPPED | OUTPATIENT
Start: 2024-02-19 | End: 2024-02-26

## 2024-02-19 RX ORDER — ONDANSETRON HYDROCHLORIDE 2 MG/ML
4 INJECTION, SOLUTION INTRAVENOUS EVERY 8 HOURS PRN
Status: DISCONTINUED | OUTPATIENT
Start: 2024-02-19 | End: 2024-02-20 | Stop reason: HOSPADM

## 2024-02-19 RX ORDER — PROPOFOL 10 MG/ML
INJECTION, EMULSION INTRAVENOUS AS NEEDED
Status: DISCONTINUED | OUTPATIENT
Start: 2024-02-19 | End: 2024-02-19

## 2024-02-19 RX ORDER — ONDANSETRON HYDROCHLORIDE 2 MG/ML
4 INJECTION, SOLUTION INTRAVENOUS ONCE AS NEEDED
Status: DISCONTINUED | OUTPATIENT
Start: 2024-02-19 | End: 2024-02-19 | Stop reason: HOSPADM

## 2024-02-19 RX ORDER — ROPIVACAINE/EPI/CLONIDINE/KET 2.46-0.005
SYRINGE (ML) INJECTION AS NEEDED
Status: DISCONTINUED | OUTPATIENT
Start: 2024-02-19 | End: 2024-02-19 | Stop reason: HOSPADM

## 2024-02-19 RX ORDER — ACETAMINOPHEN 325 MG/1
975 TABLET ORAL ONCE
Status: COMPLETED | OUTPATIENT
Start: 2024-02-19 | End: 2024-02-19

## 2024-02-19 RX ADMIN — PREGABALIN 75 MG: 75 CAPSULE ORAL at 11:25

## 2024-02-19 RX ADMIN — PROPOFOL 50 MG: 10 INJECTION, EMULSION INTRAVENOUS at 15:09

## 2024-02-19 RX ADMIN — TRANEXAMIC ACID 1000 MG: 1 INJECTION, SOLUTION INTRAVENOUS at 13:46

## 2024-02-19 RX ADMIN — EPHEDRINE SULFATE 10 MG: 50 INJECTION INTRAVENOUS at 14:49

## 2024-02-19 RX ADMIN — PROPOFOL 400 MG: 10 INJECTION, EMULSION INTRAVENOUS at 13:47

## 2024-02-19 RX ADMIN — LIDOCAINE HYDROCHLORIDE 5 ML: 10 INJECTION, SOLUTION EPIDURAL; INFILTRATION; INTRACAUDAL; PERINEURAL at 13:46

## 2024-02-19 RX ADMIN — TRANEXAMIC ACID 1000 MG: 1 INJECTION, SOLUTION INTRAVENOUS at 14:46

## 2024-02-19 RX ADMIN — Medication 200 MCG: at 14:24

## 2024-02-19 RX ADMIN — Medication 100 MCG: at 14:59

## 2024-02-19 RX ADMIN — POLYETHYLENE GLYCOL 3350 17 G: 17 POWDER, FOR SOLUTION ORAL at 20:46

## 2024-02-19 RX ADMIN — ONDANSETRON 4 MG: 2 INJECTION INTRAMUSCULAR; INTRAVENOUS at 14:44

## 2024-02-19 RX ADMIN — OXYCODONE HYDROCHLORIDE 10 MG: 10 TABLET, FILM COATED, EXTENDED RELEASE ORAL at 11:25

## 2024-02-19 RX ADMIN — SODIUM CHLORIDE, SODIUM LACTATE, POTASSIUM CHLORIDE, AND CALCIUM CHLORIDE 50 ML/HR: 600; 310; 30; 20 INJECTION, SOLUTION INTRAVENOUS at 16:52

## 2024-02-19 RX ADMIN — Medication 100 MCG: at 14:49

## 2024-02-19 RX ADMIN — MIDAZOLAM 1 MG: 1 INJECTION INTRAMUSCULAR; INTRAVENOUS at 13:30

## 2024-02-19 RX ADMIN — MELOXICAM 7.5 MG: 7.5 TABLET ORAL at 11:25

## 2024-02-19 RX ADMIN — POVIDONE-IODINE 1 APPLICATION: 5 SOLUTION TOPICAL at 11:24

## 2024-02-19 RX ADMIN — CEFAZOLIN SODIUM 2 G: 2 INJECTION, SOLUTION INTRAVENOUS at 13:45

## 2024-02-19 RX ADMIN — KETOROLAC TROMETHAMINE 15 MG: 15 INJECTION, SOLUTION INTRAMUSCULAR; INTRAVENOUS at 17:47

## 2024-02-19 RX ADMIN — PROPOFOL 50 MG: 10 INJECTION, EMULSION INTRAVENOUS at 13:46

## 2024-02-19 RX ADMIN — SODIUM CHLORIDE, SODIUM LACTATE, POTASSIUM CHLORIDE, AND CALCIUM CHLORIDE: 600; 310; 30; 20 INJECTION, SOLUTION INTRAVENOUS at 14:36

## 2024-02-19 RX ADMIN — SODIUM CHLORIDE, SODIUM LACTATE, POTASSIUM CHLORIDE, AND CALCIUM CHLORIDE 75 ML/HR: 600; 310; 30; 20 INJECTION, SOLUTION INTRAVENOUS at 11:34

## 2024-02-19 RX ADMIN — Medication 200 MCG: at 14:03

## 2024-02-19 RX ADMIN — MIDAZOLAM 1 MG: 1 INJECTION INTRAMUSCULAR; INTRAVENOUS at 13:34

## 2024-02-19 RX ADMIN — EPHEDRINE SULFATE 10 MG: 50 INJECTION INTRAVENOUS at 14:58

## 2024-02-19 RX ADMIN — FENTANYL CITRATE 50 MCG: 0.05 INJECTION, SOLUTION INTRAMUSCULAR; INTRAVENOUS at 13:34

## 2024-02-19 RX ADMIN — DEXAMETHASONE SODIUM PHOSPHATE 4 MG: 4 INJECTION, SOLUTION INTRAMUSCULAR; INTRAVENOUS at 14:44

## 2024-02-19 RX ADMIN — SCOPALAMINE 1 PATCH: 1 PATCH, EXTENDED RELEASE TRANSDERMAL at 11:24

## 2024-02-19 RX ADMIN — KETOROLAC TROMETHAMINE 15 MG: 15 INJECTION, SOLUTION INTRAMUSCULAR; INTRAVENOUS at 23:54

## 2024-02-19 RX ADMIN — DOCUSATE SODIUM 100 MG: 100 CAPSULE, LIQUID FILLED ORAL at 20:45

## 2024-02-19 RX ADMIN — EPHEDRINE SULFATE 10 MG: 50 INJECTION INTRAVENOUS at 15:05

## 2024-02-19 RX ADMIN — Medication 100 MCG: at 14:32

## 2024-02-19 RX ADMIN — Medication 100 MCG: at 14:10

## 2024-02-19 RX ADMIN — ACETAMINOPHEN 975 MG: 325 TABLET ORAL at 11:24

## 2024-02-19 RX ADMIN — CEFAZOLIN SODIUM 2 G: 2 INJECTION, SOLUTION INTRAVENOUS at 22:21

## 2024-02-19 RX ADMIN — OXYCODONE 5 MG: 5 TABLET ORAL at 20:45

## 2024-02-19 RX ADMIN — Medication 200 MCG: at 14:40

## 2024-02-19 SDOH — SOCIAL STABILITY: SOCIAL INSECURITY
WITHIN THE LAST YEAR, HAVE YOU BEEN KICKED, HIT, SLAPPED, OR OTHERWISE PHYSICALLY HURT BY YOUR PARTNER OR EX-PARTNER?: NO

## 2024-02-19 SDOH — HEALTH STABILITY: MENTAL HEALTH: CURRENT SMOKER: 0

## 2024-02-19 SDOH — SOCIAL STABILITY: SOCIAL NETWORK
IN A TYPICAL WEEK, HOW MANY TIMES DO YOU TALK ON THE PHONE WITH FAMILY, FRIENDS, OR NEIGHBORS?: MORE THAN THREE TIMES A WEEK

## 2024-02-19 SDOH — SOCIAL STABILITY: SOCIAL NETWORK: HOW OFTEN DO YOU GET TOGETHER WITH FRIENDS OR RELATIVES?: MORE THAN THREE TIMES A WEEK

## 2024-02-19 SDOH — ECONOMIC STABILITY: INCOME INSECURITY: IN THE PAST 12 MONTHS, HAS THE ELECTRIC, GAS, OIL, OR WATER COMPANY THREATENED TO SHUT OFF SERVICE IN YOUR HOME?: NO

## 2024-02-19 SDOH — SOCIAL STABILITY: SOCIAL INSECURITY: ABUSE: ADULT

## 2024-02-19 SDOH — ECONOMIC STABILITY: FOOD INSECURITY: WITHIN THE PAST 12 MONTHS, THE FOOD YOU BOUGHT JUST DIDN'T LAST AND YOU DIDN'T HAVE MONEY TO GET MORE.: NEVER TRUE

## 2024-02-19 SDOH — HEALTH STABILITY: MENTAL HEALTH: HOW OFTEN DO YOU HAVE A DRINK CONTAINING ALCOHOL?: NEVER

## 2024-02-19 SDOH — ECONOMIC STABILITY: INCOME INSECURITY: HOW HARD IS IT FOR YOU TO PAY FOR THE VERY BASICS LIKE FOOD, HOUSING, MEDICAL CARE, AND HEATING?: NOT HARD AT ALL

## 2024-02-19 SDOH — SOCIAL STABILITY: SOCIAL INSECURITY: DO YOU FEEL UNSAFE GOING BACK TO THE PLACE WHERE YOU ARE LIVING?: NO

## 2024-02-19 SDOH — SOCIAL STABILITY: SOCIAL NETWORK: ARE YOU MARRIED, WIDOWED, DIVORCED, SEPARATED, NEVER MARRIED, OR LIVING WITH A PARTNER?: DIVORCED

## 2024-02-19 SDOH — HEALTH STABILITY: MENTAL HEALTH: HOW OFTEN DO YOU HAVE 6 OR MORE DRINKS ON ONE OCCASION?: NEVER

## 2024-02-19 SDOH — SOCIAL STABILITY: SOCIAL INSECURITY: ARE YOU OR HAVE YOU BEEN THREATENED OR ABUSED PHYSICALLY, EMOTIONALLY, OR SEXUALLY BY ANYONE?: NO

## 2024-02-19 SDOH — HEALTH STABILITY: MENTAL HEALTH: HOW MANY STANDARD DRINKS CONTAINING ALCOHOL DO YOU HAVE ON A TYPICAL DAY?: 1 OR 2

## 2024-02-19 SDOH — ECONOMIC STABILITY: FOOD INSECURITY: WITHIN THE PAST 12 MONTHS, YOU WORRIED THAT YOUR FOOD WOULD RUN OUT BEFORE YOU GOT MONEY TO BUY MORE.: NEVER TRUE

## 2024-02-19 SDOH — SOCIAL STABILITY: SOCIAL INSECURITY: DO YOU FEEL ANYONE HAS EXPLOITED OR TAKEN ADVANTAGE OF YOU FINANCIALLY OR OF YOUR PERSONAL PROPERTY?: NO

## 2024-02-19 SDOH — SOCIAL STABILITY: SOCIAL INSECURITY: HAVE YOU HAD THOUGHTS OF HARMING ANYONE ELSE?: NO

## 2024-02-19 SDOH — SOCIAL STABILITY: SOCIAL INSECURITY
WITHIN THE LAST YEAR, HAVE TO BEEN RAPED OR FORCED TO HAVE ANY KIND OF SEXUAL ACTIVITY BY YOUR PARTNER OR EX-PARTNER?: NO

## 2024-02-19 SDOH — ECONOMIC STABILITY: HOUSING INSECURITY
IN THE LAST 12 MONTHS, WAS THERE A TIME WHEN YOU DID NOT HAVE A STEADY PLACE TO SLEEP OR SLEPT IN A SHELTER (INCLUDING NOW)?: NO

## 2024-02-19 SDOH — HEALTH STABILITY: MENTAL HEALTH
STRESS IS WHEN SOMEONE FEELS TENSE, NERVOUS, ANXIOUS, OR CAN'T SLEEP AT NIGHT BECAUSE THEIR MIND IS TROUBLED. HOW STRESSED ARE YOU?: NOT AT ALL

## 2024-02-19 SDOH — HEALTH STABILITY: PHYSICAL HEALTH: ON AVERAGE, HOW MANY MINUTES DO YOU ENGAGE IN EXERCISE AT THIS LEVEL?: 20 MIN

## 2024-02-19 SDOH — SOCIAL STABILITY: SOCIAL INSECURITY: WITHIN THE LAST YEAR, HAVE YOU BEEN AFRAID OF YOUR PARTNER OR EX-PARTNER?: NO

## 2024-02-19 SDOH — SOCIAL STABILITY: SOCIAL NETWORK
DO YOU BELONG TO ANY CLUBS OR ORGANIZATIONS SUCH AS CHURCH GROUPS UNIONS, FRATERNAL OR ATHLETIC GROUPS, OR SCHOOL GROUPS?: YES

## 2024-02-19 SDOH — ECONOMIC STABILITY: TRANSPORTATION INSECURITY
IN THE PAST 12 MONTHS, HAS THE LACK OF TRANSPORTATION KEPT YOU FROM MEDICAL APPOINTMENTS OR FROM GETTING MEDICATIONS?: NO

## 2024-02-19 SDOH — ECONOMIC STABILITY: HOUSING INSECURITY: IN THE LAST 12 MONTHS, HOW MANY PLACES HAVE YOU LIVED?: 1

## 2024-02-19 SDOH — SOCIAL STABILITY: SOCIAL INSECURITY: WERE YOU ABLE TO COMPLETE ALL THE BEHAVIORAL HEALTH SCREENINGS?: YES

## 2024-02-19 SDOH — ECONOMIC STABILITY: INCOME INSECURITY: IN THE LAST 12 MONTHS, WAS THERE A TIME WHEN YOU WERE NOT ABLE TO PAY THE MORTGAGE OR RENT ON TIME?: NO

## 2024-02-19 SDOH — SOCIAL STABILITY: SOCIAL INSECURITY: ARE THERE ANY APPARENT SIGNS OF INJURIES/BEHAVIORS THAT COULD BE RELATED TO ABUSE/NEGLECT?: NO

## 2024-02-19 SDOH — SOCIAL STABILITY: SOCIAL INSECURITY: WITHIN THE LAST YEAR, HAVE YOU BEEN HUMILIATED OR EMOTIONALLY ABUSED IN OTHER WAYS BY YOUR PARTNER OR EX-PARTNER?: NO

## 2024-02-19 SDOH — SOCIAL STABILITY: SOCIAL NETWORK: HOW OFTEN DO YOU ATTEND CHURCH OR RELIGIOUS SERVICES?: NEVER

## 2024-02-19 SDOH — SOCIAL STABILITY: SOCIAL INSECURITY: HAS ANYONE EVER THREATENED TO HURT YOUR FAMILY OR YOUR PETS?: NO

## 2024-02-19 SDOH — SOCIAL STABILITY: SOCIAL INSECURITY: DOES ANYONE TRY TO KEEP YOU FROM HAVING/CONTACTING OTHER FRIENDS OR DOING THINGS OUTSIDE YOUR HOME?: NO

## 2024-02-19 SDOH — SOCIAL STABILITY: SOCIAL NETWORK: HOW OFTEN DO YOU ATTENT MEETINGS OF THE CLUB OR ORGANIZATION YOU BELONG TO?: 1 TO 4 TIMES PER YEAR

## 2024-02-19 SDOH — HEALTH STABILITY: PHYSICAL HEALTH: ON AVERAGE, HOW MANY DAYS PER WEEK DO YOU ENGAGE IN MODERATE TO STRENUOUS EXERCISE (LIKE A BRISK WALK)?: 7 DAYS

## 2024-02-19 SDOH — ECONOMIC STABILITY: TRANSPORTATION INSECURITY
IN THE PAST 12 MONTHS, HAS LACK OF TRANSPORTATION KEPT YOU FROM MEETINGS, WORK, OR FROM GETTING THINGS NEEDED FOR DAILY LIVING?: NO

## 2024-02-19 ASSESSMENT — PAIN SCALES - GENERAL
PAINLEVEL_OUTOF10: 0 - NO PAIN
PAINLEVEL_OUTOF10: 1
PAINLEVEL_OUTOF10: 0 - NO PAIN
PAINLEVEL_OUTOF10: 0 - NO PAIN
PAINLEVEL_OUTOF10: 7
PAIN_LEVEL: 0
PAINLEVEL_OUTOF10: 5 - MODERATE PAIN
PAINLEVEL_OUTOF10: 0 - NO PAIN
PAINLEVEL_OUTOF10: 0 - NO PAIN

## 2024-02-19 ASSESSMENT — PAIN - FUNCTIONAL ASSESSMENT
PAIN_FUNCTIONAL_ASSESSMENT: 0-10

## 2024-02-19 ASSESSMENT — COGNITIVE AND FUNCTIONAL STATUS - GENERAL
MOBILITY SCORE: 24
DAILY ACTIVITIY SCORE: 24
PATIENT BASELINE BEDBOUND: NO

## 2024-02-19 ASSESSMENT — LIFESTYLE VARIABLES
SKIP TO QUESTIONS 9-10: 1
SKIP TO QUESTIONS 9-10: 1
AUDIT-C TOTAL SCORE: 0
HOW OFTEN DO YOU HAVE 6 OR MORE DRINKS ON ONE OCCASION: NEVER
HOW OFTEN DO YOU HAVE A DRINK CONTAINING ALCOHOL: NEVER
PRESCIPTION_ABUSE_PAST_12_MONTHS: NO
HOW MANY STANDARD DRINKS CONTAINING ALCOHOL DO YOU HAVE ON A TYPICAL DAY: PATIENT DOES NOT DRINK
SUBSTANCE_ABUSE_PAST_12_MONTHS: NO

## 2024-02-19 ASSESSMENT — ACTIVITIES OF DAILY LIVING (ADL)
HEARING - RIGHT EAR: FUNCTIONAL
GROOMING: INDEPENDENT
JUDGMENT_ADEQUATE_SAFELY_COMPLETE_DAILY_ACTIVITIES: YES
TOILETING: INDEPENDENT
WALKS IN HOME: INDEPENDENT
HEARING - LEFT EAR: FUNCTIONAL
DRESSING YOURSELF: INDEPENDENT
LACK_OF_TRANSPORTATION: NO
BATHING: INDEPENDENT
PATIENT'S MEMORY ADEQUATE TO SAFELY COMPLETE DAILY ACTIVITIES?: YES
ADEQUATE_TO_COMPLETE_ADL: YES
LACK_OF_TRANSPORTATION: NO
FEEDING YOURSELF: INDEPENDENT

## 2024-02-19 ASSESSMENT — PAIN DESCRIPTION - DESCRIPTORS: DESCRIPTORS: ACHING

## 2024-02-19 ASSESSMENT — PATIENT HEALTH QUESTIONNAIRE - PHQ9
1. LITTLE INTEREST OR PLEASURE IN DOING THINGS: NOT AT ALL
2. FEELING DOWN, DEPRESSED OR HOPELESS: NOT AT ALL
SUM OF ALL RESPONSES TO PHQ9 QUESTIONS 1 & 2: 0

## 2024-02-19 ASSESSMENT — COLUMBIA-SUICIDE SEVERITY RATING SCALE - C-SSRS

## 2024-02-19 ASSESSMENT — PAIN DESCRIPTION - LOCATION: LOCATION: HIP

## 2024-02-19 ASSESSMENT — PAIN DESCRIPTION - ORIENTATION: ORIENTATION: LEFT

## 2024-02-19 NOTE — OP NOTE
Hip Replacement Total Uncement Unilat DePuy Implants (DePuy [place] Cup, DePuy [place] Stem) (L) Operative Note     Date: 2024  OR Location: [facility]    Name: Victorina Velez : 1950, Age: 73 y.o., MRN: [id number], Sex: female    Diagnosis  Pre-op Diagnosis     * Unilateral primary osteoarthritis, left hip [M16.12] Post-op Diagnosis     * Unilateral primary osteoarthritis, left hip [M16.12]     Procedures  Hip Replacement Total Uncement Unilat DePuy Implants (DePuy [place] Cup, DePuy [place] Stem)  [id number] - UT ARTHRP ACETBLR/PROX FEM PROSTC AGRFT/ALGRFT      Surgeons      * Dontae John - Primary    Resident/Fellow/Other Assistant:  Surgeon(s) and Role:    Procedure Summary  Anesthesia: Consult  ASA: II  Anesthesia Staff: Anesthesiologist: Arturo Perea MD  C-AA: BRITTNEY Eric  Estimated Blood Loss: 100mL  Intra-op Medications:   Administrations occurring from 1300 to 1500 on 24:   Medication Name Total Dose   ropivacaine-epinephrine-clonidine-ketorolac 2.46-0.005- 0.0008-0.3mg/mL periarticular syringe 50 mL   lactated Ringer's infusion Cannot be calculated   ceFAZolin in dextrose (iso-os) (Ancef) IVPB 2 g 2 g              Anesthesia Record               Intraprocedure I/O Totals          Intake    Tranexamic Acid 0.00 mL    The total shown is the total volume documented since Anesthesia Start was filed.    ceFAZolin in dextrose (iso-os) (Ancef) IVPB 2 g 100.00 mL    Total Intake 100 mL          Specimen: No specimens collected     Staff:   Circulator: Ophelia Cole RN  Relief Circulator: Lynne Jarrell RN  Relief Scrub: Rose Lin PA-C; Nani Mohr  Scrub Person: Dodie Grace; Cheryl Talavera         Drains and/or Catheters: * None in log *    Tourniquet Times:         Implants:  Implants       Type Name Action Serial No.      Joint Hip ACETABULAR CUP, SECTOR, GRIPTON, SIZE 50MM - [id number] Implanted      Screw SCREW CANCELLOUS 6.5 X 30 - [id number] Implanted      Screw  SCREW CANCELLOUS 6.5 X 30 - [id number] Implanted      Joint Hip LINER, ALTRX, NEURTAL, 32 X 50MM - [id number] Implanted      Joint Hip HIP STEM, SUMMIT POR 4 STD - [id number] Implanted      Joint Hip HEAD, FEMORAL,CERAMIC 32+9 - [id number] Implanted               Findings: advanced OA    Indications: Victorina Velez is an 73 y.o. female who is having surgery for Unilateral primary osteoarthritis, left hip [M16.12].     The patient was seen in the preoperative area. The risks, benefits, complications, treatment options, non-operative alternatives, expected recovery and outcomes were discussed with the patient. The possibilities of reaction to medication, pulmonary aspiration, injury to surrounding structures, bleeding, recurrent infection, the need for additional procedures, failure to diagnose a condition, and creating a complication requiring transfusion or operation were discussed with the patient. The patient concurred with the proposed plan, giving informed consent.  The site of surgery was properly noted/marked if necessary per policy. The patient has been actively warmed in preoperative area. Preoperative antibiotics have been ordered and given within 1 hours of incision. Venous thrombosis prophylaxis have been ordered including bilateral sequential compression devices    Procedure Details: L MITRA  Complications:  None; patient tolerated the procedure well.    Disposition: PACU - hemodynamically stable.  Condition: stable     PREOPERATIVE DIAGNOSIS:  left hip osteoarthritis     POSTOPERATIVE DIAGNOSIS:  left hip osteoarthritis     OPERATION/PROCEDURE:  left total hip arthroplasty     SURGEON:  Dontae John MD     ASSISTANT(S):  Paulette PGY2    ANESTHESIA:  Spinal.     ESTIMATED BLOOD LOSS AND INTRAVENOUS FLUIDS:  Please see Anesthesia record.     LOCATION:  [facility]     COMPONENTS USED:  1.  [place] Gription acetabular sector shell 50  2.  [place] femoral stem tapered with [place], size 4 STD  3.  [place] AltrX  polyethylene acetabular liner, neutral, 32/50  4.  Biolox Delta ceramic femoral head +9     BRIEF CLINICAL NOTE:  The patient is a 74 yo male with severe radiographic  osteoarthritis of the left hip.  They failed conservative treatment  and wished to proceed with total hip arthroplasty, which is indicated  at this time.  We discussed the risks, benefits, alternatives of  surgery including, but not limited to, infection, damage to vessel or  nerve, bleeding, soft tissue pain, DVT, PE, problems with anesthesia,  leg length discrepancy, dislocation, continued soft tissue pain, lack  of range of motion, need for further surgery, etc.  Consent was  obtained.  They were taken to the operating room in order to undergo  the procedure.     OPERATIVE REPORT:  The patient was transferred to the operating room table.  Time-out  was performed confirming patient name, medical record number,  surgical site, and adequate and appropriate imaging.  The patient  received appropriate IV antibiotics as well as tranexamic acid prior  to the start of the procedure.  Once we prepped and draped,  posterolateral approach to the hip was performed.  The skin and  subcutaneous tissues were incised sharply.  Hemostasis was obtained  using electrocautery.  The underlying gluteal fascia was identified  and entered using electrocautery followed by Woodard scissors.  [place]  bow was placed.  The short external rotators and capsule were taken  down in one piece and tagged for later reapproximation making sure to protect the sciatic nerve.  The hip was dislocated.  Provisional femoral neck cut was performed.  The femoral head was removed.  Thye had extensive degenerative changes bipolarly.  The acetabulum was exposed.  We reamed until we had interference fit and placed a Gription shell in appropriate anteversion and inclination.  Two screws were placed to the cup in the pelvis.  Neutral trial liner was placed.  We turned our attention back to the  femur.  The femur was sequentially reamed and broached until we had proximal fit and fill. We then trialed with multiple neck lengths and offsets.  They were stable in position of sleep, flexion, internalrotation, did not impinge in external rotation.  I was happy with theposition of the components and the stability of the hip.  All trial components were removed.  The wound was thoroughly irrigated.  The real polyethylene liner was  placed.  The stem was seated to the level of broach and the head was joined with the trunion. The hip was relocated.  The short external rotators and capsule were reapproximated to the trochanter through drill holes  using #5 Ethibond.  The fascia was closed with interrupted 0 Vicryl.  The subcu was closed with interrupted 2-0 Vicryl, and the skin was closed running 3-0 Biosyn followed by Dermabond and Steri-Strips.  Dry  sterile dressing was placed.  The patient was transferred back to the hospital bed without evidence of complication.  They will be weightbearing as tolerated.  They will be on ASA and SCDs for DVT  prophylaxis.     Additional Details: ASA    Attending Attestation: I was present and scrubbed for the key portions of the procedure.

## 2024-02-19 NOTE — NURSING NOTE
0626pm Pt awake in bed tolerated dinner meal consumed 100% of meal. Pt stated starting to regain feeling in legs and feet, able to flex and extend toes, call light in reach.

## 2024-02-19 NOTE — NURSING NOTE
Patient arrived from PACU  Patient's vitals were done   Diner was encouraged  Rn discussed hourly rounding   Call light within reach

## 2024-02-19 NOTE — ANESTHESIA PREPROCEDURE EVALUATION
Patient: Victorina Velez    Procedure Information       Date/Time: 02/19/24 1300    Procedure: Hip Replacement Total Uncement Unilat DePuy Implants (DePuy [place] Cup, DePuy [place] Stem) (Left: Hip) - DePuy [place] Cup, DePuy [place] Stem    Location: [facility] / [facility]    Surgeons: Dontae John MD          Past Medical History:   Diagnosis Date    Anxiety     Breast cancer (CMS/HCC)     left (lumpectomy)    Chronic rhinitis 04/24/2019    Chronic rhinitis    Dysphonia 08/26/2021    Hoarseness    Dyspnea, unspecified 02/09/2016    Acute dyspnea    Encounter for other preprocedural examination 09/01/2016    Preoperative testing    Familial hypercholesterolemia 04/24/2018    Essential familial hypercholesterolemia    Fracture of unspecified metatarsal bone(s), unspecified foot, initial encounter for closed fracture 03/13/2014    Metatarsal bone fracture    GERD (gastroesophageal reflux disease)     Hypothyroidism     Malignant neoplasm of unspecified part of unspecified bronchus or lung (CMS/HCC) 04/24/2019    Adenocarcinoma of lung/ surgery/ radiation and chemo    Osteoporosis     Other abnormal and inconclusive findings on diagnostic imaging of breast 06/04/2019    Abnormal finding on breast imaging    Other specified disorders of ear, bilateral 11/24/2015    Blocked ear, bilateral    Pericarditis     Personal history of irradiation     Personal history of other diseases of the respiratory system 02/09/2016    History of bronchitis    Personal history of other endocrine, nutritional and metabolic disease 04/27/2020    History of hypothyroidism    Personal history of pneumonia (recurrent) 07/28/2014    History of pneumonia    Personal history of urinary (tract) infections 03/25/2016    History of urinary tract infection    Pleurodynia 04/17/2015    Chest pain, pleuritic    Rosacea     Unspecified injury of unspecified foot, initial encounter 01/27/2014    Foot injury    Unspecified symptoms and signs  involving the genitourinary system 10/26/2017    UTI symptoms    Urinary tract infection, site not specified 02/23/2018    Acute lower UTI (urinary tract infection)    Urinary tract infection, site not specified     Acute lower UTI (urinary tract infection)     Past Surgical History:   Procedure Laterality Date    BREAST LUMPECTOMY  08/28/2018    Left Breast Lumpectomy    HIP SURGERY Right 02/21/2017    Hip Surgery- 2022    LUNG LOBECTOMY  08/28/2018    Lung Lobectomy    OTHER SURGICAL HISTORY  02/09/2016    Open Treatment Of Fractures Of Both Radial And Ulnar Shafts         Relevant Problems   Endocrine   (+) Hypothyroidism (acquired)      GI   (+) Esophageal reflux      /Renal   (+) Type I RTA      Neuro/Psych   (+) Anxiety      Pulmonary   (+) Adenocarcinoma of lung (CMS/HCC)   (+) Chronic obstructive pulmonary disease (CMS/HCC)   (+) HA (dyspnea on exertion)   (+) Lung cancer (CMS/HCC)   (+) Malignant neoplasm of upper lobe of right lung (CMS/HCC)      Musculoskeletal   (+) Primary osteoarthritis of first carpometacarpal joint of right hand   (+) Primary osteoarthritis of left hip   (+) Unilateral primary osteoarthritis, left hip      Infectious Disease   (+) Furuncle of extremity      Other   (+) Inflammatory polyarthropathy (CMS/HCC)       Clinical information reviewed:   Tobacco  Allergies  Meds   Med Hx  Surg Hx   Fam Hx  Soc Hx        NPO Detail:  NPO/Void Status  Date of Last Liquid: 02/18/24  Time of Last Liquid: 2000  Date of Last Solid: 02/18/24  Time of Last Solid: 2000  Last Intake Type: Light meal  Time of Last Void: 1120         Physical Exam    Airway  Mallampati: II  TM distance: >3 FB  Neck ROM: full     Cardiovascular   Comments: deferred   Dental    Pulmonary   Comments: deferred   Abdominal     Comments: deferred           Anesthesia Plan    History of general anesthesia?: yes  History of complications of general anesthesia?: no    ASA 2     spinal     The patient is not a current  smoker.    intravenous induction   Postoperative administration of opioids is intended.  Anesthetic plan and risks discussed with patient.    Plan discussed with CAA.

## 2024-02-19 NOTE — DISCHARGE INSTRUCTIONS
MD Carolina Ba MPAS, CLAUDIO, ATC  Adult Reconstruction and Joint Replacement Surgery  Phone: [phone]     Fax: [phone]        DISCHARGE INSTRUCTIONS      PLEASE READ CAREFULLY BEFORE CONTACTING YOUR PROVIDER.    WE WORK COLLABORATIVELY AS A TEAM. CALLING MULTIPLE STAFF MEMBERS REGARDING THE SAME ISSUE WILL DELAY YOUR CARE.    [email or web address] IS THE PREFERRED COMMUNICATION FOR ALL TEAM MEMBERS.    POSTOPERATIVE INSTRUCTIONS: TOTAL HIP & TOTAL KNEE ARTHROPLASTY    JOINT CARE TEAM  Please use the information below to contact your care team following surgery.  If you are leaving a message or using the [employer] Chart portal, please include your full name, date of birth and date of surgery so that we can correctly identify you.  Your call will be returned within 1-2 business days, please do not leave multiple messages with other staff regarding a single issue while you are awaiting a return call.     Who to call Contact Information Matters needing handled   Carolina Dhaliwal PA-C  Physician Assistant [facility]   Prescription Refills   Medical questions/concerns       Ruy Rivers-    [email or web address]           [phone]  opt. 2    [phone] fax  [phone]         Prescription Refills  Scheduling office Visits  Medical questions/concerns  Leave of Absence or other paperwork  Any concerns more than 6 weeks from surgery - an appointment will need to be made   Arti Stallings MBA, BSN, RN-BC  Ortho Coordinator Cece Alonzo, RN, BSN  Ortho Coordinator Cece        __________________________________    Arturo Cantrell RN, BSN  Ortho Coordinator Daron BERNABEN-BC  Ortho Nurse Navigator Daron       [phone]         Nursing, medical question related questions or concerns within 6 weeks of surgery   Orders for Outpatient Physical Therapy  Prescription refills            [facility]LLS - [facility] or Main Office: [phone]    -You will NOT receive a call indicating that your prescription has been filled.  Please contact your pharmacy with any questions.    Medication refills will be filled Monday-Friday 7am to 1pm ONLY. Please call the office or send a [employer] message for a refill request.  Any requests received outside of this timeframe will be handled on the next business day.  Please do not call multiple times or call other members of the care team for medication needs, this will cause the refill to take longer.    Per State and Institutional policy, pain medications can only be refilled every 7 days for up to six weeks following surgery.    My Chart Portal: If you are using the My Chart portal and are requesting a medication refill, please list what type of surgery you had and left or right side, medication that needs refilled, and pharmacy you would like your medication sent.     WEIGHT BEARING weight bearing as tolerated to operative extremity     ACTIVITY-As Tolerated    DRIVING & TRAVEL AFTER SURGERY   Patients should anticipate waiting at least 4-6 weeks before traveling long distances after surgery.  You will need to stop to walk around ever 1 hour during your travel to help with blood clot prevention.    Patients may not drive until cleared by the joint nurse or the office and you are off of all narcotics.    DENTAL PROCEDURES & CLEANINGS  You must wait a minimum of 3 months for elective dental appointments after a total joint replacement, including routine cleanings or dental work including bridges, crowns, extractions, etc.. Unless, it is an emergency. You will need a prophylactic antibiotic lifelong prior to any dental visit, cleaning or procedure. Your surgeon's office or your dentist may provide a prescription antibiotic. Antibiotics are a lifelong need before dental appointments.      You do not need antibiotics for endoscopic procedures such as colonoscopy  or EGD, dermatologic biopsies or eye surgeries.    WOUND CARE  If you experience continued drainage or bleeding, you may cover with abdominal/Maxi pads (purchase at local drug store).  Knee replacements should wrap with an ace wrap.  You may shower with waterproof dressing on. Your surgical bandage will be removed by your home therapist 1 week after surgery. If you have staples intact, home care will remove in 2 weeks. If you have sutures intact, you will need to return to the office in 2 weeks for suture removal. Once the dressing is removed by home care, you may continue to shower. Let soap and water wash over the wound. DO NOT SCRUB.  Steri-strips under the bandage will remain in place until they fall off on their own.  If they are loose, you may gently remove.  If they have not fallen off in two weeks, gently peel them off. Do not remove if pulling causes resistance against the incision.  You will see suture tails sticking out of the ends of the incision.  DO NOT CUT THEM.  They will fall off when the sutures dissolve.  If they are bothersome, cover with a band aid.  Do not soak in a bath tub, hot tub, pool or lake until you are 8 weeks out from surgery.  Do not apply lotions, creams or ointments until you are 6 weeks out from surgery,    PAIN, SWELLING, BRUISING & CLICKING  Pain and swelling are a natural part of your recovery which is considered normal for up to a year after surgery.  Symptoms may be treated with movement, ice, compression stockings, elevating your leg, and by following the pain medication regimen as prescribed.  Bruising is normal for several weeks after surgery. You may also have leg swelling and pain in your shin.  You may ice areas that are tender to help with discomfort.  You are required to wear the provided compression stockings, every day, for 4 weeks following surgery.  Remove the stockings at night and place them back on in the morning.  Pain and swelling may temporarily increase  with an increase in activity or exercise.  Use ice after activity.  Audible clicking with movement or exercises is considered normal following joint replacement.  If this persists at 6 months or 1 year, please notify your surgeon.  You may also feel decreased sensation or numbness near the incision site.  This is normal and sensation may or may not return.    PERSONAL HYGIENE  You may shower upon discharging from the hospital.  Soap and water is permitted to run over the surgical dressing, steri-strips and incision.  Do not scrub directly over these items.  DO NOT soak your incision in a bath, hot tub, pool or pond/lake for a minimum of 8 weeks following your surgery.  DO NOT use lotions, creams, ointments on your wound for a minimum of 6 weeks following your surgery. At that time you may use vitamin E to assist with softening of your incision.      RESTARTING HOME ROUTINE - DIET & MEDICATIONS  Post-operative constipation can result due to a combination of inactivity, anesthesia and pain medication. To help prevent this, you should increase your water and fiber intake. Physical activity such as walking will also help stimulate the bowels.   You may resume your normal diet when you discharge home.  Choose foods that help promote good bowel habits and prevent constipation, such as foods high in fiber.  You may restart your home medications the following day after your surgery UNLESS you have been given alternate instructions.  Follow the instructions given to you on your hospital discharge instructions for more information regarding your home medications.      IN-HOME PHYSICAL THERAPY & OUTPATIENT PHYSICAL THERAPY  In-home physical therapy will start 1-2 days after you get home from the hospital.    The home care agency will call within the first 24-48 hours to set up their first visit.  Please do not call your care team to inquire during this timeframe.  Continue the exercises you were given in the hospital until you  have been seen by in-home therapy.  Make sure to provide a phone number with the ability for the home care staff to leave a message if you do not answer your phone.    Outpatient physical therapy following knee replacement surgery should begin 2-3 weeks after surgery.  You will be given physical therapy order prior to discharge from the hospital. You should call to schedule this appointment ASAP if not already scheduled before surgery.  Waiting until you are ready for outpatient physical therapy will cause a delay in your care.  You may choose any outpatient physical therapy location.      EMERGENCIES - WHEN TO CONTACT THE SURGEON'S OFFICE IMMEDIATELY  Fever >101 with chills that has been present for at least 48 hours.   Excessive bleeding from incision that will not slow down. A small amount of drainage is normal and expected.  Once pressure is applied and the area is covered, do not continue to check the area regularly.  This will remove pressure and bleeding will continue.  Leave in place for 4-6 hours.  Signs of infection of incision-excessive drainage that is soaking through your dressing (especially if it is pus-like), redness that is spreading out from the edges of your incision, or increased warmth around the area.  Excruciating pain for which the pain medication, taken as instructed, is not helping.  Severe calf pain.  Go directly to the emergency room or call 911, if you are experiencing chest pain or difficulty breathing.    ICE & COLD THERAPY INSTRUCTIONS    To assist with pain control and post-op swelling, you should be using ice regularly throughout recovery, especially for the first 6 weeks, regardless of the cold therapy method you use.      Always make sure there is a layer of protection between the cold pad and your skin.    If you are using ICE PACKS or GEL PACKS, you will need to alternate 20 minutes on, 20 minutes off twice per hour.    If you are using an ICE MACHINE, please follow the provided  ice machine instructions.  These devices differ from ice or ice packs whereas the mechanism circulates water through tubing and a pad to provide longer periods of cold therapy to the desired site.  You can use your cold devices around the clock for optimal comfort.  We recommend using cold therapy after working with therapy or completing exercises on your own.  There is no set schedule in which you must follow while using cold therapy.  Below are a few points to remember when using a cold therapy device:    You do not need to need to use the 20 on, 20 off method.  Detach the pad from the cooler and ambulate at least once every hour.  You can check your skin under the pad at this time.  You may wear the cold therapy device during periods of sleep including overnight.  If you wake up during the night, you can check the skin at this time.  You do not need to wake up specifically to perform skin checks.  Empty the cooler and pad when device is not in use.  Follow 's instructions for cleaning your cold therapy device.      DISCHARGE MEDICATIONS - Please reference the sample schedule on the reverse side for instructions on how to best schedule medications.    PAIN MEDICATION    ___X_ Tramadol / Oxycodone  Tramadol and Oxycodone have been prescribed for post-operative pain control.    These medications will only be refilled ONCE every 7 days for a period of up to 6 weeks following surgery.  After 6 weeks, you will transition to acetaminophen and over -the- counter anti-inflammatories such as Ibuprofen, Advil or Aleve in conjunction with ICE/COLD THERAPY.   Side effects may be constipation and nausea, vomiting, sleepiness, dizziness, lightheadedness, headache, blurred vision, dry mouth sweating, itching (if you have itching, over-the -counter Benadryl can be used as needed).  You may NOT operate a motor vehicle while taking these medications or have been cleared by your care team.     ___X_ Acetaminophen  (Tylenol)  Acetaminophen has been prescribed as an adjunct for pain control. Take two 500 mg tablets every 6 hours for 4 weeks. You will not receive a refill on this medication.  Do not exceed 4000mg of acetaminophen within a 24 hour period.  Side effects may include nausea, heartburn, drowsiness, and headache.    ___X_ Meloxicam (Mobic)-Meloxicam has been prescribed as an adjunct anti-inflammatory to assist in pain control.    Take one 15mg tablet once daily for 4 weeks.  You will not receive refills on this medication.   Side effects may include nausea.  May not be prescribed if you are on a more potent blood thinner than aspirin or have chronic kidney disease.    BLOOD THINNER    ___X_ Blood Thinner   A blood thinner has been prescribed to prevent blood clots in your leg or lungs. Take as prescribed on the bottle for 4 weeks. You will not receive a refill on this medication.    ANTI NAUSEA    ___X_ Proton Pump Inhibitor (PPI)-Stomach Acid Reduction Medication  If you are already on a PPI, you will continue your regular medication. If you are not, you will be prescribed Pantoprazole to help with nausea and protect your stomach while taking pain medication.  You will not receive a refill on this medication.    STOOL SOFTENERS    ___X_ Colace (Docusate Sodium) & Miralax (polyethylene glycol)  Take both medications to help with constipation while using the Oxycodone and Tramadol for pain control.  You will not receive a refill on this medication.    Continued Constipation  If you continue to be constipated despite daily use of Miralax and Colace, you try an over-the-counter Dulcolax Suppository and use per instructions on the package.       SPECIAL INSTRUCTIONS   None     You will not receive refills on the following medications.   Acetaminophen (Tylenol  Meloxicam  Miralax  Colace  Pantoprazole  Blood Thinner    Pain Medication Refills -[phone] or [place]- Monday through Friday 7am-1pm    FOLLOW-UP- You should  have an appointment with either Dr. John or DESHAUN Ann in 6 weeks.         SAMPLE              The times below are an example of how to organize medications to optimize pain control  Your actual medication schedule may vary based on your last dose taken IN THE HOSPITAL      Time 3:00 am 6:00 am 9:00 am 12:00 pm 3:00 pm 6:00 pm 9:00 pm 12:00 am   Medications Tramadol Tylenol  Oxycodone  Miralax   Blood Thinner  Colace  Pantoprazole or other PPI  Tramadol  Meloxicam Tylenol  Oxycodone Tramadol Tylenol  Oxycodone  Miralax Blood Thinner  Colace  Tramadol   Tylenol  Oxycodone            You may begin to wean off the pain medication as your pain remains controlled with increased activity.  The schedules provided are meant to serve as an example.  You may wean off based on your pain control.  Please note that pain medications are not filled beyond 6 weeks after surgery.              The times below are an example of how to WEAN OFF medications WHILE CONTINUING TO OPTIMIZE PAIN CONTROL.  Your actual medication schedule may vary based on your last dose taken.    Time 12:00am 4:00am 8:00am 12:00pm 4:00pm 8:00pm   Med Tramadol Oxycodone   Tramadol Oxycodone Tramadol Oxycodone     Time 12:00am 6:00am 12:00pm 6:00pm   Med Tramadol Oxycodone   Tramadol Oxycodone     Time 12:00am 8:00am 4:00pm   Med Tramadol Oxycodone   Tramadol     Time 12:00am 12:00pm   Med Tramadol Tramadol

## 2024-02-19 NOTE — PERIOPERATIVE NURSING NOTE
Patient alert and oriented. Denies pain at this time. Tolerating PO fluids with no issues. Patient surgical site dry and intact with ice pack applied. Patient VS stable.

## 2024-02-19 NOTE — CARE PLAN
TCC met with patient at the bedside to complete assessment. (SEE EPIC)  73 yr old male admitted observation following left hip replacement with Dr. John.  Plan is home tomorrow with home PT. [facility] is choice Acceptance/SOC pending.   Confirmed post follow up on April 4, 2024 at 2:20 pm -Daron CASTORENA letter discussed and signed. Copy sent to registration.  Sister to transport home. Patient states she is planning to stay over to her sisters home for the first night and is requesting SOC for home care to start on Feb. 22, 2024. [facility] made aware.

## 2024-02-19 NOTE — CONSULTS
Consults    Reason For Consult  Medical management for history of COPD dyslipidemia anxiety and postop pain    History Of Present Illness  Victorina Velez is a 73 y.o. female presenting with left total hip replacement; dynamically stable upon arrival from PACU.     Past Medical History  She has a past medical history of Anxiety, Chronic rhinitis (04/24/2019), Dysphonia (08/26/2021), Dyspnea, unspecified (02/09/2016), Familial hypercholesterolemia (04/24/2018), GERD (gastroesophageal reflux disease), breast cancer (09/2016), Hypothyroidism, Osteoporosis, Pericarditis, Personal history of irradiation, Personal history of other endocrine, nutritional and metabolic disease (04/27/2020), Personal history of pneumonia (recurrent) (07/28/2014), Pleurodynia (04/17/2015), Primary lung cancer (CMS/HCC) (2014), and Rosacea.    Surgical History  She has a past surgical history that includes Hip surgery (Right, 02/21/2017); Elbow fracture surgery (Left, 06/12/2015); Cataract extraction, bilateral (Bilateral, 2022); Mastectomy, partial (Left, 09/26/2016); Breast lumpectomy (Left, 09/09/2016); Lung surgery (Right, 04/23/2015); Lung lobectomy (Right, 12/18/2014); Bronchoscopy (09/24/2014); and Total hip arthroplasty (Left, 02/19/2024).     Social History  She reports that she has never smoked. She has never used smokeless tobacco. She reports current alcohol use of about 1.0 standard drink of alcohol per week. She reports that she does not use drugs.    Family History  Family History   Problem Relation Name Age of Onset    Breast cancer Mother          Allergies  Adhesive tape-silicones    Review of Systems  Noncontributory  Physical Exam  Patient is alert in no acute distress pain 2 out of 10  Lungs are clear to auscultation and percussion  Cardiac is regular rate and rhythm normal S1-S2 without murmur gallop rub click S3 or S4  Abdomen is soft nontender positive bowel sounds there is no hepatosplenomegaly or CVA tenderness  "appreciated  Extremities without cyanosis clubbing erythema or edema  Operative site exam per Ortho  Last Recorded Vitals  Blood pressure 106/53, pulse 95, temperature 36.1 °C (97 °F), temperature source Temporal, resp. rate 16, height 1.651 m (5' 5\"), weight 64.3 kg (141 lb 12.1 oz), SpO2 96 %.    Relevant Results  Scheduled medications  [START ON 2/20/2024] apixaban, 2.5 mg, oral, q12h FILIPPO  ceFAZolin, 2 g, intravenous, q8h  [START ON 2/20/2024] cholecalciferol, 2,000 Units, oral, Daily  docusate sodium, 100 mg, oral, BID  ketorolac, 15 mg, intravenous, q6h  [START ON 2/20/2024] pantoprazole, 40 mg, oral, Daily before breakfast  polyethylene glycol, 17 g, oral, Daily  polyethylene glycol, 17 g, oral, Daily      Continuous medications  lactated Ringer's, 100 mL/hr, Last Rate: 100 mL/hr (02/19/24 1701)      PRN medications  PRN medications: acetaminophen, bisacodyl, bisacodyl, cyclobenzaprine, diphenhydrAMINE, HYDROmorphone, metoclopramide **OR** metoclopramide, naloxone, ondansetron ODT **OR** ondansetron, oxyCODONE, oxyCODONE, oxygen  No results found for this or any previous visit (from the past 96 hour(s)).      Assessment/Plan     Status post left total hip replacement  Management per Ortho  PT  Pain control  Supportive care    DVT prophylaxis  SCD  Eliquis 2.5 mg p.o. twice daily to start tomorrow    I spent 35 minutes in the professional and overall care of this patient.          "

## 2024-02-19 NOTE — NURSING NOTE
0145pm Pt arrived to room 222 from [facility], A/Ox 4 denies any c/o pains no distress noted. Pt has mepilex dressing to left hip, dry and intact with ice pack intact, still has numbness present knees down to feet. Pt educated on room and surroundings with call light placed in reach.

## 2024-02-19 NOTE — PROGRESS NOTES
02/19/24 1434   Physical Activity   On average, how many days per week do you engage in moderate to strenuous exercise (like a brisk walk)? 7 days   On average, how many minutes do you engage in exercise at this level? 20 min   Financial Resource Strain   How hard is it for you to pay for the very basics like food, housing, medical care, and heating? Not hard   Housing Stability   In the last 12 months, was there a time when you were not able to pay the mortgage or rent on time? N   In the last 12 months, how many places have you lived? 1   In the last 12 months, was there a time when you did not have a steady place to sleep or slept in a shelter (including now)? N   Transportation Needs   In the past 12 months, has lack of transportation kept you from medical appointments or from getting medications? no   In the past 12 months, has lack of transportation kept you from meetings, work, or from getting things needed for daily living? No   Food Insecurity   Within the past 12 months, you worried that your food would run out before you got the money to buy more. Never true   Within the past 12 months, the food you bought just didn't last and you didn't have money to get more. Never true   Stress   Do you feel stress - tense, restless, nervous, or anxious, or unable to sleep at night because your mind is troubled all the time - these days? Not at all   Social Connections   In a typical week, how many times do you talk on the phone with family, friends, or neighbors? More than 3   How often do you get together with friends or relatives? More than 3   How often do you attend Buddhist or Confucianist services? Never   Do you belong to any clubs or organizations such as Buddhist groups, unions, fraternal or athletic groups, or school groups? Yes   How often do you attend meetings of the clubs or organizations you belong to? 1 to 4   Are you , , , , never , or living with a partner?     Intimate Partner Violence   Within the last year, have you been afraid of your partner or ex-partner? No   Within the last year, have you been humiliated or emotionally abused in other ways by your partner or ex-partner? No   Within the last year, have you been kicked, hit, slapped, or otherwise physically hurt by your partner or ex-partner? No   Within the last year, have you been raped or forced to have any kind of sexual activity by your partner or ex-partner? No   Alcohol Use   Q1: How often do you have a drink containing alcohol? Never   Q2: How many drinks containing alcohol do you have on a typical day when you are drinking? 1 or 2   Q3: How often do you have six or more drinks on one occasion? Never   Utilities   In the past 12 months has the electric, gas, oil, or water company threatened to shut off services in your home? No

## 2024-02-19 NOTE — ANESTHESIA PROCEDURE NOTES
Spinal Block    Patient location during procedure: OR  Start time: 2/19/2024 1:35 PM  End time: 2/19/2024 1:45 PM  Reason for block: primary anesthetic and at surgeon's request  Staffing  Performed: BRITTNEY   Authorized by: Arturo Perea MD    Performed by: BRITTNEY Eric    Preanesthetic Checklist  Completed: patient identified, IV checked, site marked, risks and benefits discussed, surgical consent, monitors and equipment checked, pre-op evaluation, timeout performed and sterile techniques followed  Block Timeout  RN/Licensed healthcare professional reads aloud to the Anesthesia provider and entire team: Patient identity, procedure with side and site, patient position, and as applicable the availability of implants/special equipment/special requirements.  Patient on coagulant treatment: no  Timeout performed at: 2/19/2024 1:33 PM  Spinal Block  Patient position: sitting  Prep: Betadine  Sterility prep: cap, drape, gloves, hand hygiene and mask  Sedation level: light sedation  Patient monitoring: heart rate, continuous pulse oximetry and blood pressure  Approach: midline  Vertebral space: L3-4  Injection technique: single-shot  Needle  Needle type: pencil-point   Needle gauge: 25 G  Needle length: 3.5 in (3.5 inches)  Free flowing CSF: yes    Assessment  Sensory level: T6  Block outcome: patient comfortable  Procedure assessment: patient sedated but conversant throughout procedure  Additional Notes  Patient tolerated procedure well with no immediate complications.      3 ml of local placed 1st (1% Lidocaine).    Medication dosage:    1.4 ml of 0.75 % Bupivacaine with 8.25% Dextrose.    Lot #:  [id number]  Expiration date:  03/31/2026

## 2024-02-19 NOTE — SIGNIFICANT EVENT
73F s/p L MITRA with Dr John on 2/19    - Clear liquid diet, okay to advance as tolerated. Bowel Regimen: Colace, senna, dulcolax.  - Multimodal pain therapy: scheduled tylenol, prn oxycodone, dilaudid prn for breakthrough  - mIVF to continue until patient is tolerating good PO intake, HLIV w/ good PO; Will monitor BMP on POD 1 and prn thereafter  - Weightbearing: WBAT LLE. PT/OT consult, to see by POD1 at the latest.   - Encourage IS  - Perioperative ancef q8 for 24 hours  - No indication for transfusion; monitor CBC POD1, repeat prn thereafter.  - DVT PPx: SCD,  eliquis 2.5mg BID for chemoPPx   - Maintain PIV, no boyd  - Drain: none  - Continue home meds as indicated  - Glycemic: No issues    Dispo: pending PT    This plan was discussed with the attending, Dr. John.    This patient will be followed by the Orthopaedic Surgery APPs

## 2024-02-20 ENCOUNTER — HOME HEALTH ADMISSION (OUTPATIENT)
Dept: HOME HEALTH SERVICES | Facility: HOME HEALTH | Age: 74
End: 2024-02-20
Payer: MEDICARE

## 2024-02-20 ENCOUNTER — DOCUMENTATION (OUTPATIENT)
Dept: HOME HEALTH SERVICES | Facility: HOME HEALTH | Age: 74
End: 2024-02-20
Payer: MEDICARE

## 2024-02-20 VITALS
OXYGEN SATURATION: 98 % | HEIGHT: 65 IN | TEMPERATURE: 98.1 F | SYSTOLIC BLOOD PRESSURE: 93 MMHG | BODY MASS INDEX: 23.62 KG/M2 | WEIGHT: 141.76 LBS | RESPIRATION RATE: 16 BRPM | DIASTOLIC BLOOD PRESSURE: 61 MMHG | HEART RATE: 103 BPM

## 2024-02-20 LAB
ANION GAP SERPL CALC-SCNC: 14 MMOL/L (ref 10–20)
BUN SERPL-MCNC: 12 MG/DL (ref 6–23)
CALCIUM SERPL-MCNC: 8.6 MG/DL (ref 8.6–10.3)
CHLORIDE SERPL-SCNC: 106 MMOL/L (ref 98–107)
CO2 SERPL-SCNC: 25 MMOL/L (ref 21–32)
CREAT SERPL-MCNC: 0.71 MG/DL (ref 0.5–1.05)
EGFRCR SERPLBLD CKD-EPI 2021: 89 ML/MIN/1.73M*2
ERYTHROCYTE [DISTWIDTH] IN BLOOD BY AUTOMATED COUNT: 13.4 % (ref 11.5–14.5)
GLUCOSE SERPL-MCNC: 115 MG/DL (ref 74–99)
HCT VFR BLD AUTO: 33.6 % (ref 36–46)
HGB BLD-MCNC: 10.9 G/DL (ref 12–16)
MCH RBC QN AUTO: 29.2 PG (ref 26–34)
MCHC RBC AUTO-ENTMCNC: 32.4 G/DL (ref 32–36)
MCV RBC AUTO: 90 FL (ref 80–100)
NRBC BLD-RTO: ABNORMAL /100{WBCS}
PLATELET # BLD AUTO: 218 X10*3/UL (ref 150–450)
POTASSIUM SERPL-SCNC: 3.5 MMOL/L (ref 3.5–5.3)
RBC # BLD AUTO: 3.73 X10*6/UL (ref 4–5.2)
SODIUM SERPL-SCNC: 141 MMOL/L (ref 136–145)
WBC # BLD AUTO: 9.4 X10*3/UL (ref 4.4–11.3)

## 2024-02-20 PROCEDURE — 2500000001 HC RX 250 WO HCPCS SELF ADMINISTERED DRUGS (ALT 637 FOR MEDICARE OP)

## 2024-02-20 PROCEDURE — 7100000011 HC EXTENDED STAY RECOVERY HOURLY - NURSING UNIT

## 2024-02-20 PROCEDURE — 2500000002 HC RX 250 W HCPCS SELF ADMINISTERED DRUGS (ALT 637 FOR MEDICARE OP, ALT 636 FOR OP/ED)

## 2024-02-20 PROCEDURE — 94762 N-INVAS EAR/PLS OXIMTRY CONT: CPT

## 2024-02-20 PROCEDURE — 2500000004 HC RX 250 GENERAL PHARMACY W/ HCPCS (ALT 636 FOR OP/ED)

## 2024-02-20 PROCEDURE — 97116 GAIT TRAINING THERAPY: CPT | Mod: GP

## 2024-02-20 PROCEDURE — 97161 PT EVAL LOW COMPLEX 20 MIN: CPT | Mod: GP

## 2024-02-20 PROCEDURE — 2500000004 HC RX 250 GENERAL PHARMACY W/ HCPCS (ALT 636 FOR OP/ED): Performed by: PHYSICIAN ASSISTANT

## 2024-02-20 PROCEDURE — 36415 COLL VENOUS BLD VENIPUNCTURE: CPT

## 2024-02-20 PROCEDURE — 80048 BASIC METABOLIC PNL TOTAL CA: CPT

## 2024-02-20 PROCEDURE — 97530 THERAPEUTIC ACTIVITIES: CPT | Mod: GP

## 2024-02-20 PROCEDURE — G0378 HOSPITAL OBSERVATION PER HR: HCPCS

## 2024-02-20 PROCEDURE — 85027 COMPLETE CBC AUTOMATED: CPT

## 2024-02-20 RX ADMIN — CHOLECALCIFEROL TAB 25 MCG (1000 UNIT) 2000 UNITS: 25 TAB at 08:21

## 2024-02-20 RX ADMIN — PANTOPRAZOLE SODIUM 40 MG: 40 TABLET, DELAYED RELEASE ORAL at 06:43

## 2024-02-20 RX ADMIN — KETOROLAC TROMETHAMINE 15 MG: 15 INJECTION, SOLUTION INTRAMUSCULAR; INTRAVENOUS at 06:43

## 2024-02-20 RX ADMIN — ACETAMINOPHEN 650 MG: 325 TABLET ORAL at 04:18

## 2024-02-20 RX ADMIN — APIXABAN 2.5 MG: 2.5 TABLET, FILM COATED ORAL at 08:21

## 2024-02-20 RX ADMIN — CEFAZOLIN SODIUM 2 G: 2 INJECTION, SOLUTION INTRAVENOUS at 06:43

## 2024-02-20 RX ADMIN — DOCUSATE SODIUM 100 MG: 100 CAPSULE, LIQUID FILLED ORAL at 08:21

## 2024-02-20 RX ADMIN — SODIUM CHLORIDE, SODIUM LACTATE, POTASSIUM CHLORIDE, AND CALCIUM CHLORIDE 100 ML/HR: 600; 310; 30; 20 INJECTION, SOLUTION INTRAVENOUS at 04:20

## 2024-02-20 ASSESSMENT — COGNITIVE AND FUNCTIONAL STATUS - GENERAL
DAILY ACTIVITIY SCORE: 24
MOBILITY SCORE: 24
MOBILITY SCORE: 24

## 2024-02-20 ASSESSMENT — PAIN DESCRIPTION - DESCRIPTORS
DESCRIPTORS: ACHING
DESCRIPTORS: SORE

## 2024-02-20 ASSESSMENT — PAIN DESCRIPTION - LOCATION: LOCATION: HIP

## 2024-02-20 ASSESSMENT — PAIN - FUNCTIONAL ASSESSMENT
PAIN_FUNCTIONAL_ASSESSMENT: 0-10
PAIN_FUNCTIONAL_ASSESSMENT: 0-10

## 2024-02-20 ASSESSMENT — PAIN SCALES - GENERAL
PAINLEVEL_OUTOF10: 0 - NO PAIN
PAINLEVEL_OUTOF10: 1
PAINLEVEL_OUTOF10: 3
PAINLEVEL_OUTOF10: 3

## 2024-02-20 ASSESSMENT — PAIN DESCRIPTION - ORIENTATION: ORIENTATION: LEFT

## 2024-02-20 ASSESSMENT — ACTIVITIES OF DAILY LIVING (ADL): ADL_ASSISTANCE: INDEPENDENT

## 2024-02-20 NOTE — NURSING NOTE
0745am Pt awake in bed denies any c/o pains no distress noted. Pt's left hip dressing dry and intact with ice pack in place. Pt assisted to bathroom tolerated ambulating without difficulty with walker. Pt assisted to recliner with call light in reach.

## 2024-02-20 NOTE — NURSING NOTE
1100am Pt tolerated PT without difficulty, pt safe for discharge pt awaiting meds to bed, up in recliner with call light in reach.

## 2024-02-20 NOTE — ANESTHESIA POSTPROCEDURE EVALUATION
Patient: Victorina Velez    Procedure Summary       Date: 02/19/24 Room / Location: [facility] 05 / [facility]    Anesthesia Start: 1329 Anesthesia Stop: 1528    Procedure: Hip Replacement Total Uncement Unilat DePuy Implants (DePuy [place] Cup, DePuy [place] Stem) (Left: Hip) Diagnosis:       Unilateral primary osteoarthritis, left hip      (Unilateral primary osteoarthritis, left hip [M16.12])    Surgeons: Dontae John MD Responsible Provider: Arturo Perea MD    Anesthesia Type: spinal ASA Status: 2            Anesthesia Type: spinal    Vitals Value Taken Time   /73 02/19/24 1550   Temp 36 °C (96.8 °F) 02/19/24 1520   Pulse 89 02/19/24 1550   Resp 22 02/19/24 1550   SpO2 96 % 02/19/24 1550       Anesthesia Post Evaluation    Patient location during evaluation: PACU  Patient participation: complete - patient participated  Level of consciousness: awake and alert  Pain score: 0  Pain management: satisfactory to patient  Airway patency: patent  Two or more strategies used to mitigate risk of obstructive sleep apnea  Cardiovascular status: acceptable  Respiratory status: acceptable  Hydration status: acceptable  Postoperative Nausea and Vomiting: none  Comments: Spinal regressing expectantly.        There were no known notable events for this encounter.

## 2024-02-20 NOTE — NURSING NOTE
Pt. is A&Ox4, currently resting in bed. She's having c/o moderate pain to her L hip, rating it at a 5/10. Oxycodone 5mg given @ 2045. Silver mepilex to the L hip is dry and intact. Assessment as charted, VSS. Incentive spirometry encouraged. Call light and personal belongings placed within reach.

## 2024-02-20 NOTE — PROGRESS NOTES
Patient seen, chart reviewed.  Pain is well-controlled.  Vital signs are stable.  No acute complaints.  Nursing notes no issues.      Shaye Grace MD

## 2024-02-20 NOTE — PROGRESS NOTES
Medication Education     Medication education for Victorina Velez was provided to the patient  for the following medication(s):  APAP  Apixaban  Docusate  Oxycodone  Polyethylene glycol  Tramadol    The Rx for pantoprazole was sent back to [facility] as the patient reports that she takes omeprazole 40 mg po qday. As this if a duplication of therapy patient was counseled to maintain home therapy.      Medication education provided by a Pharmacist:  Dose, frequency, storage How to take and what to do if a dose is missed Proper dose, indication, possible ADRs How the medication works and benefits of taking it    Identified potential barriers to education:  None    Method(s) of Education:  Verbal Written materials provided and reviewed    An opportunity to ask questions and receive answers was provided.     Assessment of understanding the patient :  2= meets goals/outcomes    Additional Notes (if applicable): Meds 2 Beds delivered to patient    Vickie John, RemaD

## 2024-02-20 NOTE — CARE PLAN
Problem: Pain  Goal: My pain/discomfort is manageable  Outcome: Progressing     Problem: Safety  Goal: Patient will be injury free during hospitalization  Outcome: Progressing  Goal: I will remain free of falls  Outcome: Progressing     Problem: Daily Care  Goal: Daily care needs are met  Outcome: Progressing     Problem: Psychosocial Needs  Goal: Demonstrates ability to cope with hospitalization/illness  Outcome: Progressing  Goal: Collaborate with me, my family, and caregiver to identify my specific goals  Outcome: Progressing     Problem: Discharge Barriers  Goal: My discharge needs are met  Outcome: Progressing     Problem: Pain  Goal: Takes deep breaths with improved pain control throughout the shift  Outcome: Progressing  Goal: Turns in bed with improved pain control throughout the shift  Outcome: Progressing  Goal: Walks with improved pain control throughout the shift  Outcome: Progressing  Goal: Performs ADL's with improved pain control throughout shift  Outcome: Progressing  Goal: Participates in PT with improved pain control throughout the shift  Outcome: Progressing     Problem: Fall/Injury  Goal: Not fall by end of shift  Outcome: Progressing  Goal: Be free from injury by end of the shift  Outcome: Progressing  Goal: Verbalize understanding of personal risk factors for fall in the hospital  Outcome: Progressing  Goal: Verbalize understanding of risk factor reduction measures to prevent injury from fall in the home  Outcome: Progressing  Goal: Use assistive devices by end of the shift  Outcome: Progressing  Goal: Pace activities to prevent fatigue by end of the shift  Outcome: Progressing     Problem: Deep Vein Thrombosis  Goal: I will remain free from complications of deep vein thrombosis and maintain current level of mobility  Outcome: Progressing   The patient's goals for the shift include pain control    The clinical goals for the shift include pain control

## 2024-02-20 NOTE — NURSING NOTE
Patient has no c/o pain at this time, states she has a little soreness at left hip surgical site, was given scheduled toradol 15mg IVP as ordered. Pt was educated regarding medication and verbalizes understanding. No further needs noted, call light is within reach.

## 2024-02-20 NOTE — CARE PLAN
TCC met with patient at the bedside during IDR to discuss care/discharge plan.  -PT POD #1 left total hip replacement with Dr. John.  -Plan is home with sister today and return to her home on Thurs morning.   -[facility] PT/OT to follow with a SOC of Feb. 22, 2024. (At the patients residence)  Sister will transport her at discharge.

## 2024-02-20 NOTE — NURSING NOTE
1215pm Pt and sister given written and verbal discharge instructions with verbalized understanding. Pt received meds to bed. Pt's  iv heplock removed without difficulty. Pt escorted down via wheelchair for dc home, all personal belongings accompanying.

## 2024-02-20 NOTE — PROGRESS NOTES
"  Progress Note:    Victorina Velez is a 73 y.o. female on day 0 of admission presenting with Unilateral primary osteoarthritis, left hip.    Subjective   During interdisciplinary rounds patient expresses acceptable pain level.  Denies any complaints.  I have reviewed all vitals, labs, and notes.  Blood pressure has been soft but patient has been asymptomatic.  Patient is medically acceptable for discharge.     Physical Exam  General: No acute distress, alert & oriented    Cardiac: RRR, NL S1 and S2, no murmurs, rubs or gallops    Pulmonary: Lungs clear to auscultation bilaterally, no wheezes, rhales or rhonchi    Abdomen: Soft, non-tender, non-distended    Extremities: No clubbing , cyanosis or edema.     Last Recorded Vitals  Blood pressure 93/61, pulse 103, temperature 36.7 °C (98.1 °F), temperature source Temporal, resp. rate 16, height 1.651 m (5' 5\"), weight 64.3 kg (141 lb 12.1 oz), SpO2 98 %.    Scheduled medications   Medication Dose Route Frequency    apixaban  2.5 mg oral q12h FILIPPO    cholecalciferol  2,000 Units oral Daily    docusate sodium  100 mg oral BID    ketorolac  15 mg intravenous q6h    pantoprazole  40 mg oral Daily before breakfast    polyethylene glycol  17 g oral Daily    polyethylene glycol  17 g oral Daily     Relevant Results  Results from last 7 days   Lab Units 02/20/24  0421   WBC AUTO x10*3/uL 9.4   HEMOGLOBIN g/dL 10.9*   HEMATOCRIT % 33.6*   PLATELETS AUTO x10*3/uL 218      Results from last 7 days   Lab Units 02/20/24  0421   SODIUM mmol/L 141   POTASSIUM mmol/L 3.5   CHLORIDE mmol/L 106   CO2 mmol/L 25   BUN mg/dL 12   CREATININE mg/dL 0.71   GLUCOSE mg/dL 115*   CALCIUM mg/dL 8.6         Assessment/Plan   1) status post left total hip replacement   Ortho has placed discharge order  2) DVT prophylaxis   Eliquis 2.5 mg twice daily  3) history of breast cancer   stable  4) history lung cancer   stable  5) disposition- medically acceptable for discharge        I spent 15 minutes in the " professional and overall care of this patient.      Shayy Orozco PA-C

## 2024-02-20 NOTE — HH CARE COORDINATION
Home Care received a Referral for Physical Therapy and Occupational Therapy. We have processed the referral for a Start of Care on 2/22/2024.     If you have any questions or concerns, please feel free to contact us at [phone]. Follow the prompts, enter your five digit zip code, and you will be directed to your care team on [facility] 1.

## 2024-02-20 NOTE — PROGRESS NOTES
Interdisciplinary Rounds were completed at the bedside with Patient.  Staff participating in rounds included: Clinical Nurse Orthopedic Coordinator Transitional Care Coordinator Director of Nursing/Assistant Nurse Manager Hospitalist MD/PA Physical Therapist.  Topics discussed included: Today's Plan of Care Discharge Plan and Accommodations Physical Therapy Medications/Preferred Pharmacy and the Patient was given the opportunity to ask additional questions or bring up any concerns at that time.  During the final discharge discussion and review of instructions, they will have another opportunity to review questions or concerns prior to leaving our care.  Patient was given information on who to call post-discharge should new questions or concerns arise.      The patient's plan includes:    Discharge Date/Disposition:  Home, Today with, Home Care Services; staying with sister tonight  Discharge Needs: No Equipment Needs Identified  Medications/Pharmacy: [id number] service utilized for discharge prescriptions through [facility]

## 2024-02-20 NOTE — PROGRESS NOTES
Introduced myself to patient and review patient respiratory hx.   Pt states that she was diagnosed with lung CA and had a RL Lobectomy and does not utilize inhalers as her disease process is strictly restrictive due to her loss of lung. She was complaining of pain, need to use the restroom and need for more ice all which were discussed and completed with MARIN Santoyo.  Patient not c/o SOB or wheezing. SpO2 96%  . Discussed IS usage, patient had already done 3 breaths since start of the hour, and knows proper usage. Aware I will be checking in overnight and to call with any needs/issues.     Isabelle Matos, RRT

## 2024-02-20 NOTE — PROGRESS NOTES
Physical Therapy    Physical Therapy Evaluation & Treatment    Patient Name: Victorina Velez  MRN: [id number]  Today's Date: 2/20/2024   Time Calculation  Start Time: 0953  Stop Time: 1040  Time Calculation (min): 47 min    Assessment/Plan   PT Assessment  PT Assessment Results: Decreased range of motion, Decreased mobility, Decreased strength, Orthopedic restrictions, Pain  Rehab Prognosis: Excellent  Evaluation/Treatment Tolerance: Patient tolerated treatment well  Medical Staff Made Aware: Yes  Strengths: Ability to acquire knowledge, Access to adaptive/assistive products, Attitude of self, Capable of completing ADLs semi/independent, Insight into problems, Premorbid level of function, Rehab experience, Support of Caregivers  Barriers to Participation: Comorbidities, Housing layout  End of Session Communication: Bedside nurse  Assessment Comment: PT eval low. Pt presenting to eval with deficits in functional mobility, strength on RLE, and increased discomfort in R hip post session. pt understanding of HEP and L hip precautions. Pt expected to progress well towards goal. PT recommends [facility] PT with assist as needed.  End of Session Patient Position: Up in chair, Alarm off, not on at start of session (Care coordinator at bedside at PT exit. call bell in reach, ice on L hip; all needs met; RN notified of stats)   IP OR SWING BED PT PLAN  Inpatient or Swing Bed: Inpatient  PT Plan  Treatment/Interventions: Transfer training, Gait training, Stair training, Balance training, Strengthening, Range of motion, Therapeutic activity, Home exercise program, Positioning  PT Plan: Skilled PT  PT Frequency: BID  PT Discharge Recommendations: Low intensity level of continued care  Equipment Recommended upon Discharge: Wheeled walker, Straight cane  PT Recommended Transfer Status: Assistive device, Stand by assist  PT - OK to Discharge: Yes      Subjective     General Visit Information:  General  Reason for Referral: Pt presenting to [facility]  on 2/19/24 for L posterior THR by Dr. Ruiz.  Past Medical History Relevant to Rehab: breast CA with lumpectomy, dyspnea, metatarsal bone fracture, adenocarcinoma of lung with lung lobectomy, hypothyroidism, UTI, pneumonia, mastectomy, hip surgery, anxiety, OP, HA, elbow surgery  Family/Caregiver Present: No  Prior to Session Communication: Bedside nurse  Patient Position Received: Up in chair  General Comment: cleared by RN and agreeable to session. very motivated and pleasant pt.  Home Living:  Home Living  Type of Home: House  Lives With: Alone  Home Adaptive Equipment: Walker rolling or standard, Cane  Home Layout: Two level, Stairs to alternate level with rails  Alternate Level Stairs-Number of Steps: 13 steps to reach level with hand rail; enters through basement level to reach main level  Home Access: Stairs to enter without rails  Entrance Stairs-Number of Steps: 1 step into home from outside  Home Living Comments: will be staying at [facility] which is two level with 1st floor having kitchen/living room and second level having bedroom/bathroom - 13 steps with railing  Prior Level of Function:  Prior Function Per Pt/Caregiver Report  Level of [place]: Independent with ADLs and functional transfers  ADL Assistance: Independent  Homemaking Assistance: Independent  Ambulatory Assistance: Independent  Vocational: Retired  Prior Function Comments: no falls within last 6 months  Precautions:  Precautions  LE Weight Bearing Status: Weight Bearing as Tolerated  Medical Precautions: Fall precautions  Post-Surgical Precautions: Left hip precautions  Precautions Comment: posterior hip precautions  Vital Signs:       Objective   Pain:  Pain Assessment  Pain Assessment: 0-10  Pain Score: 3  Pain Type: Surgical pain  Pain Location: Hip  Pain Orientation: Left  Pain Radiating Towards: incision; into lateral thigh  Pain Descriptors: Aching  Pain Interventions: Cold applied, Ambulation/increased activity,  Repositioned, Rest  Response to Interventions: continued therapy and ice was placed for help  Cognition:  Cognition  Overall Cognitive Status: Within Functional Limits  Orientation Level: Oriented X4  Impulsive: Moderately    General Assessments:  General Observation  General Observation: dressing dry and intact on L hip               Activity Tolerance  Endurance: Endurance does not limit participation in activity    Sensation  Light Touch: No apparent deficits    Coordination  Movements are Fluid and Coordinated: Yes    Postural Control  Postural Control: Within Functional Limits    Static Sitting Balance  Static Sitting-Balance Support: Bilateral upper extremity supported, Feet supported  Static Sitting-Level of Assistance: Independent  Dynamic Sitting Balance  Dynamic Sitting-Balance Support: Bilateral upper extremity supported  Dynamic Sitting-Comments: independent    Static Standing Balance  Static Standing-Balance Support: No upper extremity supported  Static Standing-Level of Assistance: Close supervision  Static Standing-Comment/Number of Minutes: with Rw present for safety  Dynamic Standing Balance  Dynamic Standing-Balance Support: No upper extremity supported  Dynamic Standing-Comments: close supervision wit hRW present  Functional Assessments:  Transfers  Transfer: Yes  Transfer 1  Technique 1: Sit to stand, Stand to sit  Transfer Device 1: Walker  Transfer Level of Assistance 1: Close supervision, Minimal verbal cues  Trials/Comments 1: completed transfers from recliner with armrests present; good handplacement. RW present for stability once upright. pt did have tendency to cause RLE internal rotation with standing and PT continuously cued for counteracting with external rotation to maintian hip precaution; no buckle or lOB    Ambulation/Gait Training  Ambulation/Gait Training Performed: Yes  Ambulation/Gait Training 1  Device 1: Rolling walker  Assistance 1: Distant supervision, Close  supervision  Quality of Gait 1: Diminished heel strike, Decreased step length, Antalgic, Forward flexed posture  Comments/Distance (ft) 1: 150 feetx2 with RW. Pt having additional valgus moment at knee during swing phase of RLE with slight internal rotation however cued to attempt external rotation to counteract internal rotation; fair carry over from cueing. No buckle or LOB. reciprocal stepping used with slower wan    Stairs  Stairs: Yes  Stairs  Rails 1: Left  Device 1: Railing, Single point cane  Assistance 1: Close supervision  Comment/Number of Steps 1: completed 3 step with cane and railing, cued for step to pattern at CSx1, completed without LOB or buckle. 3 more steps completed with 1 railing on L , laterally with step to pattern continued and CSx1.  Extremity/Trunk Assessments:  RUE   RUE : Within Functional Limits  LUE   LUE: Within Functional Limits  RLE   RLE : Within Functional Limits (having limitations with external rotation to counteract internal rotation with valgus knee moment)  LLE   LLE : Within Functional Limits  Treatments:  Therapeutic Exercise  Therapeutic Exercise Performed: Yes  B ankle pumps, L quad sets, L gluteal sets, L heel slides, L SAQ, and L hip abduction x  5  reps each.   - educated on importance of hip positioning to trunk positioning with there ex to prevent dislocation    Therapeutic Activity  Therapeutic Activity Performed: Yes  Therapeutic Activity 1: pt dressed BUE without assist and BLE with assist for RLE to maintain hip precautions. Pt able to stand to adjust clothing without LOB orbuckle and no UE support on RW.  Outcome Measures:  [facility] Basic Mobility  Turning from your back to your side while in a flat bed without using bedrails: None  Moving from lying on your back to sitting on the side of a flat bed without using bedrails: None  Moving to and from bed to chair (including a wheelchair): None  Standing up from a chair using your arms (e.g. wheelchair or bedside  chair): None  To walk in hospital room: None  Climbing 3-5 steps with railing: None  Basic Mobility - Total Score: 24    Encounter Problems       Encounter Problems (Active)       Safety       LTG - Patient will adhere to hip precautions during ADL's and transfers       Start:  02/19/24            LTG - Patient will demonstrate safety requirements appropriate to situation/environment       Start:  02/19/24            LTG - Patient will utilize safety techniques       Start:  02/19/24            STG - Patient locks brakes on wheelchair       Start:  02/19/24            STG - Patient uses call light consistently to request assistance with transfers       Start:  02/19/24            STG - Patient uses gait belt during all transfers       Start:  02/19/24            Goal 1       Start:  02/19/24            Goal 2       Start:  02/19/24            Goal 3       Start:  02/19/24                   Education Documentation  Handouts, taught by Krystal Taveras, PT at 2/20/2024  3:27 PM.  Learner: Patient  Readiness: Acceptance  Method: Explanation, Demonstration, Handout  Response: Verbalizes Understanding, Demonstrated Understanding    Precautions, taught by Krystal Taveras PT at 2/20/2024  3:27 PM.  Learner: Patient  Readiness: Acceptance  Method: Explanation, Demonstration, Handout  Response: Verbalizes Understanding, Demonstrated Understanding    Body Mechanics, taught by Krystal Taveras PT at 2/20/2024  3:27 PM.  Learner: Patient  Readiness: Acceptance  Method: Explanation, Demonstration, Handout  Response: Verbalizes Understanding, Demonstrated Understanding    Home Exercise Program, taught by Krystal Taveras, PT at 2/20/2024  3:27 PM.  Learner: Patient  Readiness: Acceptance  Method: Explanation, Demonstration, Handout  Response: Verbalizes Understanding, Demonstrated Understanding    Mobility Training, taught by Krystal Taveras, PT at 2/20/2024  3:27 PM.  Learner: Patient  Readiness: Acceptance  Method: Explanation,  Demonstration, Handout  Response: Verbalizes Understanding, Demonstrated Understanding    Education Comments  No comments found.    Krystal Taveras, PT, DPT

## 2024-02-21 NOTE — DISCHARGE SUMMARY
MD Carolina Ba, ERICKS, PACindyC, ATC  Adult Reconstruction and Joint Replacement Surgery  Phone: [phone]     Fax:[phone]                      Discharge Summary    Discharge Diagnosis  Left Total Hip Arthroplasty    Issues Requiring Follow-Up  Home care services to start within 48 hours. Outpatient PT to start 2 weeks  S/P total Joint for Knees only.    Test Results Pending At Discharge  Pending Labs       No current pending labs.            Hospital Course  Patient underwent Left Total Hip Arthroplasty on 2/19/24 without complications. The patient was then taken to the PACU in stable condition. Patient was then transferred to the or.  Pain was appropriately controlled. Diet was advanced as tolerated. Patient progressed adequately through their recovery during hospital stay including PT/ OT and were recommended for discharge. Patient was then discharged on 2/20/2024 to home in stable condition. Patient had uneventful hospital course. Patient was instructed on the use of pain medications as needed for pain. The signs and symptoms of infection were discussed and the patient was given our number to call should they have any questions or concerns following discharge.    Based on my clinical judgment, the patient was provided with a 7-day prescription for opioid medication at 30 MED, indicated for treatment of acute pain in the setting of recent Total Joint Arthroplasty. OARRS report was run and has demonstrated an appropriate time course.  The patient has been provided with counseling pertaining to safe use of opioid medication.    Patient may use operative extremity WBAT with use of walker for assistance with ambulation .  Mepilex dressing to be removed POD # 7 by home care and incision left open to air  OAC for DVT prophylaxis started on POD #1 and to be taken for 30 days    Patient is to follow-up in 6 weeks at scheduled post-op visit.     Face-to Face  Pertinent Physical  Exam At Time of Discharge  Physical Exam  Vitals and nursing note reviewed. VSS, Afebrile  Constitutional:       Appearance: Normal appearance, awake and alert.  HENT:      Head: Normocephalic and atraumatic.       Pupils: Pupils are equal, round, and reactive to light.   Cardiovascular:      Rate and Rhythm: Normal rate and regular rhythm.   Pulmonary:      Effort: Pulmonary effort is normal.     Abdominal:         Palpations: Abdomen is soft.   Musculoskeletal:   Sensation intact bilaterally, sural/saph/sp/tibal n.  Motor intact flexion/extension/DF/PF/EHL/FHL bilaterally. Palpable symmetric DP/PT pulse bilaterally.    Skin:      Bulky Dressing intact to the surgical extremity. No signs of gross bloody or purulent drainage.     General: Skin is warm and dry.      Capillary Refill: Capillary refill takes less than 2 seconds.   Neurological:      General: No focal deficit present.      Mental Status: She is alert and oriented to person, place, and time. Mental status is at baseline.   Psychiatric:         Mood and Affect: Mood normal.        Home Medications  Scheduled medications  No current facility-administered medications for this encounter.    Current Outpatient Medications:     calcium carbonate (CALCIUM 500 ORAL), Take 1 capsule by mouth 2 times a week., Disp: , Rfl:     cholecalciferol (Vitamin D-3) 125 MCG (5000 UT) capsule, Take 1 tablet by mouth once daily., Disp: , Rfl:     estradiol (Estrace) 0.01 % (0.1 mg/gram) vaginal cream, APPLY A PEA-SIZED AMOUNT TO VAGINAL OPENING EVERY MONDAY, WEDNESDAY, AND FRIDAY EVENING., Disp: , Rfl:     magnesium oxide (Mag-Ox) 400 mg tablet, Take 1 tablet (400 mg) by mouth once daily., Disp: , Rfl:     THYROID ORAL, Take 1 Capful by mouth once daily. Plant based for T3-T4- holistic pharmacy, Disp: , Rfl:     acetaminophen (Tylenol Extra Strength) 500 mg tablet, Take 2 tablets (1,000 mg) by mouth every 6 hours if needed for mild pain (1 - 3)., Disp: 240 tablet, Rfl: 0     apixaban (Eliquis) 2.5 mg tablet, Take 1 tablet (2.5 mg) by mouth 2 times a day., Disp: 60 tablet, Rfl: 0    docusate sodium (Colace) 100 mg capsule, Take 1 capsule (100 mg) by mouth 2 times a day., Disp: 60 capsule, Rfl: 0    oxyCODONE (Roxicodone) 5 mg immediate release tablet, Take 1 tablet (5 mg) by mouth every 6 hours if needed for severe pain (7 - 10) for up to 7 days., Disp: 28 tablet, Rfl: 0    pantoprazole (ProtoNix) 40 mg EC tablet, Take 1 tablet (40 mg) by mouth once daily. Do not crush, chew, or split., Disp: 30 tablet, Rfl: 0    polyethylene glycol (Glycolax, Miralax) 17 gram/dose powder, Mix 1 capful (17 g) in 8 oz of water and drink by mouth once daily, Disp: 238 g, Rfl: 0    traMADol (Ultram) 50 mg tablet, Take 1 tablet (50 mg) by mouth every 6 hours if needed for severe pain (7 - 10) for up to 7 days., Disp: 28 tablet, Rfl: 0     PRN medications      Discharge medications     Your medication list        START taking these medications        Instructions Last Dose Given Next Dose Due   docusate sodium 100 mg capsule  Commonly known as: Colace      Take 1 capsule (100 mg) by mouth 2 times a day.       Eliquis 2.5 mg tablet  Generic drug: apixaban      Take 1 tablet (2.5 mg) by mouth 2 times a day.       oxyCODONE 5 mg immediate release tablet  Commonly known as: Roxicodone      Take 1 tablet (5 mg) by mouth every 6 hours if needed for severe pain (7 - 10) for up to 7 days.       pantoprazole 40 mg EC tablet  Commonly known as: ProtoNix      Take 1 tablet (40 mg) by mouth once daily. Do not crush, chew, or split.       polyethylene glycol 17 gram/dose powder  Commonly known as: Glycolax, Miralax      Mix 1 capful (17 g) in 8 oz of water and drink by mouth once daily       traMADol 50 mg tablet  Commonly known as: Ultram      Take 1 tablet (50 mg) by mouth every 6 hours if needed for severe pain (7 - 10) for up to 7 days.              CHANGE how you take these medications        Instructions Last Dose  Given Next Dose Due   acetaminophen 500 mg tablet  Commonly known as: Tylenol Extra Strength  What changed:   medication strength  how much to take      Take 2 tablets (1,000 mg) by mouth every 6 hours if needed for mild pain (1 - 3).              CONTINUE taking these medications        Instructions Last Dose Given Next Dose Due   CALCIUM 500 ORAL           cholecalciferol 125 MCG (5000 UT) capsule  Commonly known as: Vitamin D-3           estradiol 0.01 % (0.1 mg/gram) vaginal cream  Commonly known as: Estrace           magnesium oxide 400 mg tablet  Commonly known as: Mag-Ox           THYROID ORAL                  STOP taking these medications      chlorhexidine 0.12 % solution  Commonly known as: Peridex        ibuprofen 200 mg tablet        omeprazole 20 mg DR capsule  Commonly known as: PriLOSEC                  Where to Get Your Medications        These medications were sent to [facility]  [address] , [facility] [address], [facility] [address]      Hours: 8 AM to 6 PM Mon-Fri, 9 AM to 1 PM Saturday Phone: [phone]   acetaminophen 500 mg tablet  docusate sodium 100 mg capsule  Eliquis 2.5 mg tablet  oxyCODONE 5 mg immediate release tablet  pantoprazole 40 mg EC tablet  polyethylene glycol 17 gram/dose powder  traMADol 50 mg tablet         You have not been prescribed any medications.     Review of Systems    Outpatient Follow-Up  Patient to follow-up with /Carolina Dhaliwal PA-C.  Thank you for trusting us with your care. You should be scheduled for a follow-up post-surgical visit in 6 weeks.    Special Instructions  none    Please read discharge instructions provided by your surgeon before calling with questions as this will delay care.    Medication refills-Oxycodone and Tramadol will be refilled every 7 days per state law. Request refills through [employer] preferably. All medication requests may take up to 72 hours to refill and refills after Friday 1pm will be refilled on the next business  day.      No future appointments.    DIGNA Cheney, PA-C ATC  Orthopedic Physician Assisant  Adult Reconstruction and Total Joint Replacement  General Orthopedics  Department of Orthopaedic Surgery  [place] [address]  [employer] preferred

## 2024-02-22 ENCOUNTER — HOME CARE VISIT (OUTPATIENT)
Dept: HOME HEALTH SERVICES | Facility: HOME HEALTH | Age: 74
End: 2024-02-22
Payer: MEDICARE

## 2024-02-22 VITALS
RESPIRATION RATE: 16 BRPM | WEIGHT: 141 LBS | HEART RATE: 74 BPM | TEMPERATURE: 98.4 F | HEIGHT: 66 IN | BODY MASS INDEX: 22.66 KG/M2 | DIASTOLIC BLOOD PRESSURE: 74 MMHG | SYSTOLIC BLOOD PRESSURE: 120 MMHG

## 2024-02-22 PROCEDURE — 1090000001 HH PPS REVENUE CREDIT

## 2024-02-22 PROCEDURE — 169592 NO-PAY CLAIM PROCEDURE

## 2024-02-22 PROCEDURE — 0023 HH SOC

## 2024-02-22 PROCEDURE — G0151 HHCP-SERV OF PT,EA 15 MIN: HCPCS | Mod: HHH

## 2024-02-22 PROCEDURE — G0152 HHCP-SERV OF OT,EA 15 MIN: HCPCS | Mod: HHH

## 2024-02-22 PROCEDURE — 1090000002 HH PPS REVENUE DEBIT

## 2024-02-22 SDOH — HEALTH STABILITY: PHYSICAL HEALTH: PHYSICAL EXERCISE: 15

## 2024-02-22 SDOH — HEALTH STABILITY: PHYSICAL HEALTH: EXERCISE TYPE: THA

## 2024-02-22 SDOH — HEALTH STABILITY: PHYSICAL HEALTH: PHYSICAL EXERCISE: SIT, STAND, SUPINE

## 2024-02-22 SDOH — HEALTH STABILITY: PHYSICAL HEALTH: EXERCISE ACTIVITY: HIP 3 WAY, TKE, HS, QS

## 2024-02-22 SDOH — HEALTH STABILITY: PHYSICAL HEALTH: EXERCISE ACTIVITIES SETS: 2

## 2024-02-22 ASSESSMENT — ACTIVITIES OF DAILY LIVING (ADL)
AMBULATION_DISTANCE/DURATION_TOLERATED: 80 FT
BATHING ASSESSED: 1
TOILETING: INDEPENDENT
AMBULATION ASSISTANCE ON FLAT SURFACES: 1
ENTERING_EXITING_HOME: MINIMUM ASSIST
CURRENT_FUNCTION: INDEPENDENT
DRESSING_LB_CURRENT_FUNCTION: SUPERVISION
BATHING_CURRENT_FUNCTION: INDEPENDENT
TOILETING: 1
OASIS_M1830: 03
PHYSICAL TRANSFERS ASSESSED: 1

## 2024-02-22 ASSESSMENT — ENCOUNTER SYMPTOMS
MUSCLE WEAKNESS: 1
PAIN: 1
PAIN LOCATION - EXACERBATING FACTORS: ROM
LIMITED RANGE OF MOTION: 1
HIGHEST PAIN SEVERITY IN PAST 24 HOURS: 4/10
PAIN LOCATION: LEFT HIP
SUBJECTIVE PAIN PROGRESSION: GRADUALLY IMPROVING
LOWEST PAIN SEVERITY IN PAST 24 HOURS: 2/10
PAIN SEVERITY GOAL: 0/10
PERSON REPORTING PAIN: PATIENT
PAIN LOCATION - PAIN DURATION: HR
PAIN LOCATION: LEFT HIP
PAIN LOCATION - PAIN SEVERITY: 5/10
LOWEST PAIN SEVERITY IN PAST 24 HOURS: 2/10
PAIN SEVERITY GOAL: 1/10
PAIN LOCATION - RELIEVING FACTORS: REST
HIGHEST PAIN SEVERITY IN PAST 24 HOURS: 5/10
PAIN LOCATION - PAIN QUALITY: ACHE
PAIN LOCATION - PAIN FREQUENCY: FREQUENT
PAIN: 1
SUBJECTIVE PAIN PROGRESSION: WAXING AND WANING
PERSON REPORTING PAIN: PATIENT

## 2024-02-22 NOTE — SIGNIFICANT EVENT
Thank you for taking my call today regarding your recent joint replacement surgery with Dr. Dontae John.      We discussed that: Home Health Care services (physical and/or occupational therapy) have been initiated Your pain is Controlled on the current regimen Will fluctuate throughout recovery with increased activity You are able to tolerate regular activity and exercises The importance of continued cold therapy throughout recovery The importance of following the prescribed precautions by your surgeon You have had a bowel movement The importance of continuing blood thinner as prescribed The importance of wearing compression stockings as prescribed    You indicated that all of your questions have been answered at the time of our call.    Please don't hesitate to reach out if you have any additional questions or concerns.    Arti Stallings MBA, BSN, RN-BC  JANICE Bassett, RN  Orthopedic Program Navigators  [facility] [phone]

## 2024-02-23 PROCEDURE — 1090000002 HH PPS REVENUE DEBIT

## 2024-02-23 PROCEDURE — 1090000001 HH PPS REVENUE CREDIT

## 2024-02-24 PROCEDURE — 1090000001 HH PPS REVENUE CREDIT

## 2024-02-24 PROCEDURE — 1090000002 HH PPS REVENUE DEBIT

## 2024-02-25 PROCEDURE — 1090000001 HH PPS REVENUE CREDIT

## 2024-02-25 PROCEDURE — 1090000002 HH PPS REVENUE DEBIT

## 2024-02-26 ENCOUNTER — HOME CARE VISIT (OUTPATIENT)
Dept: HOME HEALTH SERVICES | Facility: HOME HEALTH | Age: 74
End: 2024-02-26
Payer: MEDICARE

## 2024-02-26 VITALS — HEART RATE: 84 BPM | RESPIRATION RATE: 16 BRPM

## 2024-02-26 PROCEDURE — 1090000002 HH PPS REVENUE DEBIT

## 2024-02-26 PROCEDURE — 1090000001 HH PPS REVENUE CREDIT

## 2024-02-26 PROCEDURE — G0151 HHCP-SERV OF PT,EA 15 MIN: HCPCS | Mod: HHH

## 2024-02-26 SDOH — HEALTH STABILITY: PHYSICAL HEALTH: PHYSICAL EXERCISE: 15

## 2024-02-26 SDOH — HEALTH STABILITY: PHYSICAL HEALTH: EXERCISE ACTIVITY: OPEN AND CLOSED FACILTY

## 2024-02-26 SDOH — HEALTH STABILITY: PHYSICAL HEALTH: EXERCISE TYPE: LEFT HIP

## 2024-02-26 SDOH — HEALTH STABILITY: PHYSICAL HEALTH: PHYSICAL EXERCISE: SIT, SUPINE

## 2024-02-26 SDOH — HEALTH STABILITY: PHYSICAL HEALTH: EXERCISE ACTIVITIES SETS: 2

## 2024-02-26 ASSESSMENT — ENCOUNTER SYMPTOMS
MUSCLE WEAKNESS: 1
LOWEST PAIN SEVERITY IN PAST 24 HOURS: 1/10
PAIN LOCATION - PAIN SEVERITY: 5/10
PERSON REPORTING PAIN: PATIENT
LIMITED RANGE OF MOTION: 1
SUBJECTIVE PAIN PROGRESSION: WAXING AND WANING
PAIN LOCATION - RELIEVING FACTORS: MEDS
PAIN LOCATION - PAIN FREQUENCY: FREQUENT
PAIN LOCATION - PAIN DURATION: HR
PAIN LOCATION - EXACERBATING FACTORS: ROM
PAIN SEVERITY GOAL: 0/10
PAIN LOCATION - PAIN QUALITY: ACHE
PAIN LOCATION: LEFT HIP
HIGHEST PAIN SEVERITY IN PAST 24 HOURS: 5/10
PAIN: 1

## 2024-02-26 ASSESSMENT — ACTIVITIES OF DAILY LIVING (ADL)
AMBULATION ASSISTANCE ON FLAT SURFACES: 1
AMBULATION_DISTANCE/DURATION_TOLERATED: 80 FT X2

## 2024-02-27 PROCEDURE — 1090000002 HH PPS REVENUE DEBIT

## 2024-02-27 PROCEDURE — 1090000001 HH PPS REVENUE CREDIT

## 2024-02-28 ENCOUNTER — HOME CARE VISIT (OUTPATIENT)
Dept: HOME HEALTH SERVICES | Facility: HOME HEALTH | Age: 74
End: 2024-02-28
Payer: MEDICARE

## 2024-02-28 VITALS — RESPIRATION RATE: 16 BRPM | OXYGEN SATURATION: 98 % | HEART RATE: 92 BPM

## 2024-02-28 PROCEDURE — G0151 HHCP-SERV OF PT,EA 15 MIN: HCPCS | Mod: HHH

## 2024-02-28 PROCEDURE — 1090000002 HH PPS REVENUE DEBIT

## 2024-02-28 PROCEDURE — 1090000001 HH PPS REVENUE CREDIT

## 2024-02-28 SDOH — HEALTH STABILITY: PHYSICAL HEALTH: EXERCISE TYPE: THA

## 2024-02-28 ASSESSMENT — ENCOUNTER SYMPTOMS
PAIN LOCATION - PAIN DURATION: HR
LOWEST PAIN SEVERITY IN PAST 24 HOURS: 2/10
SUBJECTIVE PAIN PROGRESSION: GRADUALLY IMPROVING
PAIN SEVERITY GOAL: 0/10
MUSCLE WEAKNESS: 1
PAIN LOCATION - RELIEVING FACTORS: CP
PAIN LOCATION: LEFT HIP
PAIN LOCATION - PAIN FREQUENCY: FREQUENT
PAIN LOCATION - EXACERBATING FACTORS: ROM
HIGHEST PAIN SEVERITY IN PAST 24 HOURS: 5/10
PAIN LOCATION - PAIN SEVERITY: 5/10
PAIN: 1
LIMITED RANGE OF MOTION: 1
PAIN LOCATION - PAIN QUALITY: ACHE
PERSON REPORTING PAIN: PATIENT

## 2024-02-28 ASSESSMENT — ACTIVITIES OF DAILY LIVING (ADL)
AMBULATION_DISTANCE/DURATION_TOLERATED: 100 FT
AMBULATION ASSISTANCE: INDEPENDENT
AMBULATION ASSISTANCE: 1
AMBULATION ASSISTANCE ON FLAT SURFACES: 1

## 2024-02-29 PROCEDURE — G0180 MD CERTIFICATION HHA PATIENT: HCPCS | Performed by: ORTHOPAEDIC SURGERY

## 2024-02-29 PROCEDURE — 1090000002 HH PPS REVENUE DEBIT

## 2024-02-29 PROCEDURE — 1090000001 HH PPS REVENUE CREDIT

## 2024-03-01 PROCEDURE — 1090000002 HH PPS REVENUE DEBIT

## 2024-03-01 PROCEDURE — 1090000001 HH PPS REVENUE CREDIT

## 2024-03-02 PROCEDURE — 1090000001 HH PPS REVENUE CREDIT

## 2024-03-02 PROCEDURE — 1090000002 HH PPS REVENUE DEBIT

## 2024-03-03 PROCEDURE — 1090000002 HH PPS REVENUE DEBIT

## 2024-03-03 PROCEDURE — 1090000001 HH PPS REVENUE CREDIT

## 2024-03-04 PROCEDURE — 1090000002 HH PPS REVENUE DEBIT

## 2024-03-04 PROCEDURE — 1090000001 HH PPS REVENUE CREDIT

## 2024-03-05 ENCOUNTER — HOME CARE VISIT (OUTPATIENT)
Dept: HOME HEALTH SERVICES | Facility: HOME HEALTH | Age: 74
End: 2024-03-05
Payer: MEDICARE

## 2024-03-05 VITALS — RESPIRATION RATE: 16 BRPM | HEART RATE: 64 BPM | OXYGEN SATURATION: 97 %

## 2024-03-05 PROCEDURE — 1090000001 HH PPS REVENUE CREDIT

## 2024-03-05 PROCEDURE — G0151 HHCP-SERV OF PT,EA 15 MIN: HCPCS | Mod: HHH

## 2024-03-05 PROCEDURE — 1090000002 HH PPS REVENUE DEBIT

## 2024-03-05 SDOH — HEALTH STABILITY: PHYSICAL HEALTH: EXERCISE ACTIVITY: OPEN/CLOSED CHAIN EX

## 2024-03-05 SDOH — HEALTH STABILITY: PHYSICAL HEALTH: EXERCISE ACTIVITIES SETS: 2

## 2024-03-05 SDOH — HEALTH STABILITY: PHYSICAL HEALTH: PHYSICAL EXERCISE: STAND, SIT, SUPINE

## 2024-03-05 SDOH — HEALTH STABILITY: PHYSICAL HEALTH: PHYSICAL EXERCISE: 15

## 2024-03-05 SDOH — HEALTH STABILITY: PHYSICAL HEALTH: EXERCISE TYPE: THA

## 2024-03-05 ASSESSMENT — ENCOUNTER SYMPTOMS
PAIN LOCATION - RELIEVING FACTORS: CP, MEDS
LOWEST PAIN SEVERITY IN PAST 24 HOURS: 1/10
PAIN LOCATION: LEFT HIP
HIGHEST PAIN SEVERITY IN PAST 24 HOURS: 5/10
SUBJECTIVE PAIN PROGRESSION: GRADUALLY IMPROVING
PAIN SEVERITY GOAL: 0/10
PAIN LOCATION - PAIN FREQUENCY: INTERMITTENT
PERSON REPORTING PAIN: PATIENT
PAIN LOCATION - EXACERBATING FACTORS: ROM
PAIN LOCATION - PAIN DURATION: HR
PAIN LOCATION - PAIN SEVERITY: 5/10
PAIN LOCATION - PAIN QUALITY: ACHE
PAIN: 1

## 2024-03-05 ASSESSMENT — ACTIVITIES OF DAILY LIVING (ADL)
OASIS_M1830: 00
AMBULATION_DISTANCE/DURATION_TOLERATED: 150 FT
AMBULATION ASSISTANCE ON FLAT SURFACES: 1
HOME_HEALTH_OASIS: 00
CURRENT_FUNCTION: INDEPENDENT
PHYSICAL TRANSFERS ASSESSED: 1

## 2024-03-18 ENCOUNTER — TELEPHONE (OUTPATIENT)
Dept: ORTHOPEDIC SURGERY | Facility: HOSPITAL | Age: 74
End: 2024-03-18

## 2024-03-18 NOTE — TELEPHONE ENCOUNTER
Thank you for calling today.  We discussed that you are not 4 weeks out from surgery with Dr. John.  You may discontinue use of the compression stockings.  You indicated that you were finished with in-home physical therapy and are no longer using narcotics.  You are using the walker periodically when going out of the home.  You are permitted to resume driving and are encouraged to stay close to home to start.  Your last dose of Eliquis will be Thursday morning.    Please don't hesitate to reach out if you have any additional questions or concerns.    Arti Stallings MBA, BSN, RN-BC  Orthopedic Program Navigator  [facility] [phone]

## 2024-04-04 ENCOUNTER — HOSPITAL ENCOUNTER (OUTPATIENT)
Dept: RADIOLOGY | Facility: CLINIC | Age: 74
Discharge: HOME | End: 2024-04-04
Payer: MEDICARE

## 2024-04-04 ENCOUNTER — OFFICE VISIT (OUTPATIENT)
Dept: ORTHOPEDIC SURGERY | Facility: CLINIC | Age: 74
End: 2024-04-04
Payer: MEDICARE

## 2024-04-04 VITALS — HEIGHT: 66 IN | BODY MASS INDEX: 22.66 KG/M2 | WEIGHT: 141 LBS

## 2024-04-04 DIAGNOSIS — Z47.1 AFTERCARE FOLLOWING LEFT HIP JOINT REPLACEMENT SURGERY: ICD-10-CM

## 2024-04-04 DIAGNOSIS — Z96.642 AFTERCARE FOLLOWING LEFT HIP JOINT REPLACEMENT SURGERY: ICD-10-CM

## 2024-04-04 PROCEDURE — 1159F MED LIST DOCD IN RCRD: CPT | Performed by: PHYSICIAN ASSISTANT

## 2024-04-04 PROCEDURE — 99024 POSTOP FOLLOW-UP VISIT: CPT | Performed by: PHYSICIAN ASSISTANT

## 2024-04-04 PROCEDURE — 1036F TOBACCO NON-USER: CPT | Performed by: PHYSICIAN ASSISTANT

## 2024-04-04 PROCEDURE — 1125F AMNT PAIN NOTED PAIN PRSNT: CPT | Performed by: PHYSICIAN ASSISTANT

## 2024-04-04 PROCEDURE — 73502 X-RAY EXAM HIP UNI 2-3 VIEWS: CPT | Mod: LEFT SIDE | Performed by: STUDENT IN AN ORGANIZED HEALTH CARE EDUCATION/TRAINING PROGRAM

## 2024-04-04 PROCEDURE — 73502 X-RAY EXAM HIP UNI 2-3 VIEWS: CPT | Mod: LT

## 2024-04-04 ASSESSMENT — PAIN DESCRIPTION - DESCRIPTORS: DESCRIPTORS: SORE;ACHING;TIGHTNESS

## 2024-04-04 ASSESSMENT — PAIN - FUNCTIONAL ASSESSMENT: PAIN_FUNCTIONAL_ASSESSMENT: 0-10

## 2024-04-04 ASSESSMENT — PAIN SCALES - GENERAL: PAINLEVEL_OUTOF10: 3

## 2024-04-04 NOTE — PROGRESS NOTES
Carolina Dhaliwal, DIGNA, PACindyC, ATC  Adult Reconstruction and Joint Replacement Surgery  Phone: [phone]     Fax:[phone]            Chief Complaint   Patient presents with    Left Hip - Post-op       HPI:    Victorina Velez is a pleasant 73 y.o. year-old female here for follow-up of their side: left total hip arthroplasty by Dr. John.  The patient is approximately 6 week(s) postop.The patient has no mechanical symptoms.  The patient has no swelling and pain.   The patients wound has healed uneventfully.  The patient has been doing HEP and/or outpatient PT.  No complications postoperatively.      Review of Systems  Past Medical History:   Diagnosis Date    Anxiety     Chronic rhinitis 04/24/2019    Chronic rhinitis    Dysphonia 08/26/2021    Hoarseness    Dyspnea, unspecified 02/09/2016    Acute dyspnea    Familial hypercholesterolemia 04/24/2018    Essential familial hypercholesterolemia    GERD (gastroesophageal reflux disease)     HX: breast cancer 09/2016    s/p partial mastectomy w/ radiation and tamoxifen (ductal and lobular carcinoma in situ)    Hypothyroidism     Osteoporosis     Pericarditis     Personal history of irradiation     Personal history of other endocrine, nutritional and metabolic disease 04/27/2020    History of hypothyroidism    Personal history of pneumonia (recurrent) 07/28/2014    History of pneumonia    Pleurodynia 04/17/2015    Chest pain, pleuritic    Primary lung cancer (CMS/HCC) 2014    s/p concurrent chemoRT, RLL lobectomy and mediastinal node dissection, adjuvent chemo in 7090-6205    Rosacea      Patient Active Problem List   Diagnosis    Anxiety    Arthralgia of hip    Carcinoma in situ of left breast    Chronic obstructive pulmonary disease (CMS/HCC)    Chronic rhinitis    Decreased sense of smell    HA (dyspnea on exertion)    Dyslipidemia    Esophageal reflux    Dysphagia    Hyperglycemia    Lung cancer (CMS/HCC)    Nasal dryness    Adenocarcinoma of lung  (CMS/HCC)    Age-related nuclear cataract of left eye    Age-related nuclear cataract of right eye    Deviated nasal septum    Dry eyes    Osteopenia    Primary insomnia    Type I RTA    Dry eye syndrome of both lacrimal glands    Facial pressure    Fullness in ear, bilateral    Postnasal drip    Vaginal atrophy    Combined forms of age-related cataract of left eye    Combined forms of age-related cataract of right eye    Malignant neoplasm of upper lobe of right lung (CMS/HCC)    Hypothyroidism (acquired)    Abdominal pain, LLQ (left lower quadrant)    Actinic keratosis    Acute allergic conjunctivitis of both eyes    Acute non-recurrent maxillary sinusitis    Blepharitis of both eyes    Closed fracture of olecranon process of ulna    Conjunctivochalasis of both eyes    Other seborrheic keratosis    Skin changes due to chronic exposure to nonionizing radiation, unspecified    Contracture, left elbow    Dermatitis, unspecified    Diarrhea of presumed infectious origin    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    Dysuria    Epidermal cyst    Facial pain    Furuncle of extremity    Rash and other nonspecific skin eruption    H/O: lung cancer    Hemangioma of skin and subcutaneous tissue    Impacted cerumen of both ears    Inflammatory polyarthropathy (CMS/HCC)    Loss of height    Lung nodule, solitary    Melanocytic nevi of right upper limb, including shoulder    Melanocytic nevi of scalp and neck    Melanocytic nevi of trunk    Melanocytic nevi of other parts of face    Myopia of both eyes    Myopia with presbyopia    Nasal congestion    Neoplasm of uncertain behavior of skin    Open fracture of head of radius    Other specified disorders of thyroid    Pain in left elbow    Pain of toe of left foot    PCO (posterior capsular opacification), bilateral    Pericarditis, acute    Pleural effusion    Primary osteoarthritis of first carpometacarpal joint of right hand    Primary osteoarthritis of left hip     Pseudophakia of both eyes    Dyshidrosis (pompholyx)    Pustules determined by examination    PVD (posterior vitreous detachment), right eye    Rosacea, unspecified    Stiffness of left elbow joint    T3 low in serum    Postmenopausal atrophic vaginitis    Postmenopausal osteoporosis    Unilateral primary osteoarthritis, left hip    Status post left hip replacement     Medication Documentation Review Audit       Reviewed by Uma Jimenez PCNA (Patient Care Technician) on 24 at 1406      Medication Order Taking? Sig Documenting Provider Last Dose Status   apixaban (Eliquis) 2.5 mg tablet [id number]  Take 1 tablet (2.5 mg) by mouth 2 times a day. Naun Caldwell MD   24 235   calcium carbonate (CALCIUM 500 ORAL) [id number] Yes Take 1 capsule by mouth 2 times a week. Historical Provider, MD Taking Active   cholecalciferol (Vitamin D-3) 125 MCG (5000 UT) capsule [id number] Yes Take 1 tablet by mouth once daily. Historical Provider, MD Taking Active   estradiol (Estrace) 0.01 % (0.1 mg/gram) vaginal cream [id number] Yes APPLY A PEA-SIZED AMOUNT TO VAGINAL OPENING EVERY MONDAY, WEDNESDAY, AND FRIDAY EVENING. Historical Provider, MD Taking Active   magnesium oxide (Mag-Ox) 400 mg tablet [id number] Yes Take 1 tablet (400 mg) by mouth once daily. Historical Provider, MD Taking Active   pantoprazole (ProtoNix) 40 mg EC tablet [id number]  Take 1 tablet (40 mg) by mouth once daily. Do not crush, chew, or split. Naun Caldwell MD   24 235   polyethylene glycol (Glycolax, Miralax) 17 gram/dose powder [id number] Yes Mix 1 capful (17 g) in 8 oz of water and drink by mouth once daily Naun Caldwell MD Taking Active   THYROID ORAL [id number] Yes Take 1 Capful by mouth once daily. Plant based for T3-T4- holistic pharmacy Historical Provider, MD Taking Active                  Allergies   Allergen Reactions    Adhesive Tape-Silicones Rash     Social History     Socioeconomic History    Marital  status: Single     Spouse name: Not on file    Number of children: Not on file    Years of education: Not on file    Highest education level: Not on file   Occupational History    Not on file   Tobacco Use    Smoking status: Never    Smokeless tobacco: Never    Tobacco comments:     Smoked during her teenage years. None since   Vaping Use    Vaping Use: Never used   Substance and Sexual Activity    Alcohol use: Yes     Alcohol/week: 1.0 standard drink of alcohol     Types: 1 Standard drinks or equivalent per week    Drug use: Never    Sexual activity: Defer   Other Topics Concern    Not on file   Social History Narrative    Not on file     Social Determinants of Health     Financial Resource Strain: Low Risk  (2/19/2024)    Overall Financial Resource Strain (CARDIA)     Difficulty of Paying Living Expenses: Not hard at all   Food Insecurity: No Food Insecurity (2/19/2024)    Hunger Vital Sign     Worried About Running Out of Food in the Last Year: Never true     Ran Out of Food in the Last Year: Never true   Transportation Needs: No Transportation Needs (3/5/2024)    OASIS : Transportation     Lack of Transportation (Medical): No     Lack of Transportation (Non-Medical): No     Patient Unable or Declines to Respond: No   Physical Activity: Insufficiently Active (2/19/2024)    Exercise Vital Sign     Days of Exercise per Week: 7 days     Minutes of Exercise per Session: 20 min   Stress: No Stress Concern Present (2/19/2024)    [place] of Occupational [employer] - Occupational Stress Questionnaire     Feeling of Stress : Not at all   Social Connections: Feeling Socially Integrated (3/5/2024)    OASIS : Social Isolation     Frequency of experiencing loneliness or isolation: Never   Recent Concern: Social Connections - Moderately Isolated (2/19/2024)    Social Connection and Isolation Panel [NHANES]     Frequency of Communication with Friends and Family: More than three times a week     Frequency of  Social Gatherings with Friends and Family: More than three times a week     Attends Scientology Services: Never     Active Member of Clubs or Organizations: Yes     Attends Club or Organization Meetings: 1 to 4 times per year     Marital Status:    Intimate Partner Violence: Not At Risk (2/19/2024)    Humiliation, Afraid, Rape, and Kick questionnaire     Fear of Current or Ex-Partner: No     Emotionally Abused: No     Physically Abused: No     Sexually Abused: No   Housing Stability: Low Risk  (2/19/2024)    Housing Stability Vital Sign     Unable to Pay for Housing in the Last Year: No     Number of Places Lived in the Last Year: 1     Unstable Housing in the Last Year: No     Past Surgical History:   Procedure Laterality Date    BREAST LUMPECTOMY Left 09/09/2016    Postive for Ductal and lobular carcinoma in situ    BRONCHOSCOPY  09/24/2014    w/ mediastinoscopy (Positive for primary lung cancer w/ mets to LN)    CATARACT EXTRACTION, BILATERAL Bilateral 2022    ELBOW FRACTURE SURGERY Left 06/12/2015    ORIF    HIP SURGERY Right 02/21/2017    Hip Surgery- 2022    LUNG LOBECTOMY Right 12/18/2014    RLL lobectomy and mediastinal LN dissection    LUNG SURGERY Right 04/23/2015    VATS for pleural effusion s/p right lower lobectomy w/ chest tube    MASTECTOMY, PARTIAL Left 09/26/2016    Positve margins on prior lumpectomy    TOTAL HIP ARTHROPLASTY Left 02/19/2024       Physical Exam  side: left Hip  There were no vitals filed for this visit.  AxO x 3 in NAD, appears stated age. Cooperative.   Assistive Device: no device. Coordination and balance intact.  Skin inact over bilateral upper and lower extremities, no erythema, ecchymosis, temperature changes. No popliteal lymphadenopathy,  No other overlying lesion  mood: euthymic  Respirations non labored  Surgical hip demonstrates pain free ROM with mild tenderness to palpation over greater trochanter laterally.  The incision is midline healing well with no signs of  surrounding infection, dehiscence or drainage.   Neurovascularly back to baseline  5/5 hip flexion/knee extension/DF/PF/EHL  SILT in chirag/saph/ per/tib distribution   Extremities warm and well perfused.  No lower extremity calf tenderness or swelling  2+ Femoral/DP/PT pulses bilaterally    IMAGING:  No images are attached to the encounter.    I personally reviewed multiple views of the hip today in clinic.  Status post side: left Total Hip Arthroplasty. The implant is well fixed, well aligned.  No evidence of franc-implant fracture, lucency or dislocation. Leg lengths closely restored.    Impression/Plan:    Victorina Velez is doing well post-operatively and happy with the results of the operation.     S/P Total side: left Hip  Arthroplasty  I talked with patient at length about activity precautions and dislocation precautions in detail. I talked with patient at length about activity precautions and dislocation precautions in detail which include avoidance of hip flexion > 90 degrees with simultaneous internal rotation.  Patient can progress activities as tolerated. And understands their permanent precautions. At this time, you may gradually increase your activities and get back to a normal, low-impact lifestyle. Please avoid running, jumping, and heavy lifting.    This was  demonstrated in the room by JEFF and patient verbalized understanding. Patient should also limit running, jumping, weight lifting and repetitive activity. The patient verbalizes understanding of these permanent precautions.        2. Continue HEP or outpatient PT, per protocol.    3. Continue Post-operative instructions.    4. Discussed the importance of dental prophylactic dental antibiotics lifelong. Patient may request medication refill through MyChart,       Pharmacy or surgeons office.    All questions were answered.    Follow-up 1 year with xrays at next visit.      Carolina hDaliwal MPAS, PA-C, ATC  Orthopedic Physician Assisant  Adult  Reconstruction and Total Joint Replacement  General Orthopedics  Department of Orthopaedic Surgery  [facility]  [address] [place] [address]  [place] messaging preferred

## 2024-04-10 NOTE — PROGRESS NOTES
Physical Therapy  Physical Therapy Orthopedic Evaluation    Patient Name: Victorina Velez  MRN: [id number]  Today's Date: 4/11/2024                  Insurance:  Visit number: 1 of med nec  Authorization info: no auth  Insurance Type: Payor: MEDICARE / Plan: MEDICARE PART A AND B / Product Type: *No Product type* /      Current Problem  1. Pain of left hip joint        2. Aftercare following left hip joint replacement surgery  Referral to Physical Therapy        Referred by: Dr. Dontae Ruiz  General:   2/19/24 for L posterior THR by Dr. Ruiz.  Past Medical History Relevant to Rehab: breast CA with lumpectomy, dyspnea, metatarsal bone fracture, adenocarcinoma of lung with lung lobectomy, hypothyroidism, UTI, pneumonia, mastectomy, hip surgery, anxiety, OP, HA, elbow surgery  Family/Caregiver Present: No  Precautions:   MITRA 2/19/24 posterior hip precautions  Medical History Form: Reviewed (scanned into chart)  Subjective:   Subjective   R hip MITRA 2002. Reports her right his is still feeling good.   Chief Complaint: Left hip pain and decreased functional ability. 1 year chronic history of L hip pain.   DOS 2/19/24 L MITRA  Current Condition:   Better  Pain:   2/10 in lateral and anterior hip. 5/10 stiffness lateral left hip   Description: stiffness and aching  Aggravating Factors:  Standing, Walking, and Sitting 15 minute tolerance. Standing 10 minute tolerance.   Relieving Factors:  Rest  Previous Interventions/Treatments: HHPT DC date    Prior Function Per Pt/Caregiver Report  Level of [place]: Independent with ADLs and functional transfers  ADL Assistance: Independent  Homemaking Assistance: Independent  Ambulatory Assistance: Independent  Vocational: Retired  Prior Function Comments: no falls within last 6 months  Home Living:  Home Living  Type of Home: House  Lives With: Alone  Home Adaptive Equipment: Walker rolling or standard, Cane  Home Layout: Two level, Stairs to alternate level with  rails  Alternate Level Stairs-Number of Steps: 13 steps to reach level with hand rail; enters through basement level to reach main level  Home Access: Stairs to enter without rails  Primary Language: English  Patient's Goal(s) for Therapy: Pt wants to be able to walk for cardiovascular health 30-40 minutes a day.     Red Flags: Do you have any of the following? No  Fever/chills, unexplained weight changes, dizziness/fainting, unexplained change in bowel or bladder functions, unexplained malaise or muscle weakness, night pain/sweats, numbness or tingling    Objective:  Objective   Posture: B knee valgus with patellas pointing medial.  Lower Extremity Functional Movements  Transfers: Unable to perform STS without UE support. Difficulty supine to sit due to low back pain.   Gait: B knee valgus. Decreased stride length L. Decreased TKE B. Decreased hip extension B.   SL Balance: unable to perform on L side. R side 10 seconds  STS UE support on table 9 reps 30 seconds.  STS without UE unable to perform safely  MMT  Knee extension R 5  L 4  Knee flexion      R 5  L 4  Dorsiflex            R 5  L 4+    TUG with SPC: average 11.75 seconds  6 minute walk test: 3 mins 11 seconds stopped due to SOB and not wanting to push this with medical history. While ambulating over 1 minute anterior groin pain occurs.   Outcome Measures:  Other Measures  Lower Extremity Funtional Score (LEFS): 35   Treatments:  Pt education on precautions and current home program. B hip ABD with BTB provided for HEP.  EDUCATION: Home exercise program, plan of care, activity modifications, pain management, and injury pathology   Access Code [email or web address]  Home exercise program provided and reviewed. Pt verbalized and demonstrated a good understanding of the program. Picklify access code or hard copy placed in chart     Assessment: Patient presents with signs and symptoms consistent with post-op L MITRA, resulting in limited participation in pain-free ADLs and  inability to perform at their prior level of function. Pt would benefit from physical therapy to address the impairments found & listed previously in the objective section in order to return to safe and pain-free ADLs and prior level of function.     Plan:  PT Plan: Skilled PT  PT Frequency: 2 times per week  Duration: 10 weeks  Onset Date: 02/19/24  Certification Period Start Date: 04/11/24  Certification Period End Date: 07/10/24  Rehab Potential: Good  Plan of Care Agreement: Patient  Planned Interventions include: therapeutic exercise, self-care home management, manual therapy, therapeutic activities, gait training, neuromuscular coordination, vasopneumatic, dry needling, aquatic therapy    Goals: Set and discussed today  STG  Pt will demonstrate an excellent understanding of MITRA precautions and time frames in 2 weeks  The patient will report an increase in LEFS score to 45 in 4 weeks Demonstrating a clinically significant change (MCID 9).   Pt will be able to perform STS without UE support in 4 weeks.  LTG  The patient will demonstrate a decrease in average TUG time to <10 seconds to demonstrate a decreased fall prediction rate.   The patient will perform a single leg stance >10 seconds to demonstrate no fall risk bilaterally.  The patient will report an increase in LEFS score to 55. Demonstrating a clinically significant change (MCID 9).   Pt will be able to walk continuously for 30 mins for cardiovascular benefits - patient stated goal.     Plan of care was developed with input and agreement by the patient      Erick Nicole, PT

## 2024-04-11 ENCOUNTER — EVALUATION (OUTPATIENT)
Dept: PHYSICAL THERAPY | Facility: CLINIC | Age: 74
End: 2024-04-11
Payer: MEDICARE

## 2024-04-11 DIAGNOSIS — M25.552 PAIN OF LEFT HIP JOINT: Primary | ICD-10-CM

## 2024-04-11 DIAGNOSIS — Z96.642 AFTERCARE FOLLOWING LEFT HIP JOINT REPLACEMENT SURGERY: ICD-10-CM

## 2024-04-11 DIAGNOSIS — Z47.1 AFTERCARE FOLLOWING LEFT HIP JOINT REPLACEMENT SURGERY: ICD-10-CM

## 2024-04-11 PROCEDURE — 97162 PT EVAL MOD COMPLEX 30 MIN: CPT | Mod: GP | Performed by: PHYSICAL THERAPIST

## 2024-04-11 PROCEDURE — 97110 THERAPEUTIC EXERCISES: CPT | Mod: GP | Performed by: PHYSICAL THERAPIST

## 2024-04-16 ENCOUNTER — TREATMENT (OUTPATIENT)
Dept: PHYSICAL THERAPY | Facility: CLINIC | Age: 74
End: 2024-04-16
Payer: MEDICARE

## 2024-04-16 DIAGNOSIS — M25.552 PAIN OF LEFT HIP JOINT: Primary | ICD-10-CM

## 2024-04-16 PROCEDURE — 97110 THERAPEUTIC EXERCISES: CPT | Mod: GP | Performed by: PHYSICAL THERAPIST

## 2024-04-16 PROCEDURE — 97140 MANUAL THERAPY 1/> REGIONS: CPT | Mod: GP | Performed by: PHYSICAL THERAPIST

## 2024-04-16 NOTE — PROGRESS NOTES
"  Physical Therapy  Physical Therapy Treatment Note    Patient Name: Victorina Velez  MRN: [id number]  Today's Date: 4/16/2024  Time Calculation  Start Time: 1015  Stop Time: 1101  Time Calculation (min): 46 min  PT Therapeutic Procedures Time Entry  Manual Therapy Time Entry: 12  Therapeutic Exercise Time Entry: 28        Insurance:  Visit number: 2 of med nec  Authorization info: no auth   Insurance Type: Payor: MEDICARE / Plan: MEDICARE PART A AND B / Product Type: *No Product type* /   Current Problem  1. Pain of left hip joint          General    2/19/24 for L posterior THR by Dr. Ruiz.  Past Medical History Relevant to Rehab: breast CA with lumpectomy, dyspnea, metatarsal bone fracture, adenocarcinoma of lung with lung lobectomy, hypothyroidism, UTI, pneumonia, mastectomy, hip surgery, anxiety, OP, HA, elbow surgery  Family/Caregiver Present: No  Precautions:   MITRA 2/19/24 posterior hip precautions  Subjective:   Subjective   Patient reports soreness in lateral L hip and lateral lower leg. She states this has been present since surgery and occurs with weight bearing and ambulation  Pain     Objective:   B knee valgus with ambulation and decreased stride length and hip extension L.   Treatments:   Access Code [email or web address]  THERE EX  Knee extension GTB LAQ 15 x 3  Knee flexion RTB HS curl 10 x 3  Bridge with slight stagger 10 x 5\"  Iso abd GTB 10 x 10\"  Iso add ball 10 x 10\"  MANUAL  IASTM theragun to lateral hip, ITB, and lateral lower leg.  NMRE  THERE ACT    Assessment:  Pt reported decreased aching p manual therapy today. Home exercise program provided and reviewed. Pt verbalized and demonstrated a good understanding of the program. Guilherme access code copied.      Plan: Continue to progress lower body strengthening. Monitor home program.      Goals:   STG  Pt will demonstrate an excellent understanding of MITRA precautions and time frames in 2 weeks  The patient will report an increase in LEFS score to 45 in " 4 weeks Demonstrating a clinically significant change (MCID 9).   Pt will be able to perform STS without UE support in 4 weeks.  LTG  The patient will demonstrate a decrease in average TUG time to <10 seconds to demonstrate a decreased fall prediction rate.   The patient will perform a single leg stance >10 seconds to demonstrate no fall risk bilaterally.  The patient will report an increase in LEFS score to 55. Demonstrating a clinically significant change (MCID 9).   Pt will be able to walk continuously for 30 mins for cardiovascular benefits - patient stated goal.     Erick Nicole, PT

## 2024-04-23 ENCOUNTER — APPOINTMENT (OUTPATIENT)
Dept: PHYSICAL THERAPY | Facility: CLINIC | Age: 74
End: 2024-04-23
Payer: MEDICARE

## 2024-04-23 ENCOUNTER — OFFICE VISIT (OUTPATIENT)
Dept: OTOLARYNGOLOGY | Facility: CLINIC | Age: 74
End: 2024-04-23
Payer: MEDICARE

## 2024-04-23 VITALS — BODY MASS INDEX: 22.85 KG/M2 | TEMPERATURE: 97.2 F | WEIGHT: 142.2 LBS | HEIGHT: 66 IN

## 2024-04-23 DIAGNOSIS — J34.2 NASAL SEPTAL DEVIATION: ICD-10-CM

## 2024-04-23 DIAGNOSIS — J31.0 CHRONIC RHINITIS: ICD-10-CM

## 2024-04-23 DIAGNOSIS — J34.89 NASAL AND SINUS DISCHARGE: Primary | ICD-10-CM

## 2024-04-23 DIAGNOSIS — H61.23 BILATERAL IMPACTED CERUMEN: ICD-10-CM

## 2024-04-23 PROCEDURE — 1160F RVW MEDS BY RX/DR IN RCRD: CPT | Performed by: NURSE PRACTITIONER

## 2024-04-23 PROCEDURE — 1159F MED LIST DOCD IN RCRD: CPT | Performed by: NURSE PRACTITIONER

## 2024-04-23 PROCEDURE — 1036F TOBACCO NON-USER: CPT | Performed by: NURSE PRACTITIONER

## 2024-04-23 PROCEDURE — 99213 OFFICE O/P EST LOW 20 MIN: CPT | Performed by: NURSE PRACTITIONER

## 2024-04-23 NOTE — PROGRESS NOTES
Subjective   Patient ID: Victorina Velez is a 73 y.o. female who presents for Follow-up.  HPI  4/23/24- Patient presents for follow-up. She notes that she recently started taking xyzal at night. She was getting crusted along her lashes at night and this has resolved since starting the xyzal. She notes that she has been having popping in her ears for the last several months. She denies any decreased hearing. She has tinnitus to a higher frequently. She has been irrigating 3-4 times each week. She has not had any sinus infections which is a huge improvement from previously. She is not using any nasal sprays.      Review of Systems  Review of systems is negative for constitutional, ophthalmological, cardiac, pulmonary, renal, gastrointestinal, musculoskeletal, mental health, endocrine, or neurologic disorders (except as listed in the HPI, PMH, and Problem List).     Objective   Physical Exam  CONSTITUTIONAL: Patient appears well developed and well nourished.   GENERAL: this is a healthy appearing female who appears stated age. The patient is alert and appropriately verbally conversant without hoarseness. This patient is in no apparent distress.   FACE: The face was inspected and no cutaneous masses or lesions were visualized. There was no erythema or edema noted. Facial movement was symmetric. No skin lesions were detected. There was no sinus tenderness elicited. TMJ crepitus present.   EYES: Extra-ocular muscle function was intact. No nystagmus was observed. Pupils were equal.     NOSE: Examination of the nose revealed the nasal dorsum to be midline. Intranasal exam reveals the septum is deviated left. The inferior turbinates were normal. No masses, polyps, mucopus, or other lesion on anterior rhinoscopy. See below procedure note as applicable for further exam.  EARS: Examination of the ears revealed that the auricles were normally formed with no lesions. The external auditory canals were moderately obstructed with  cerumen bilaterally.   RESPIRATORY: Normal inspiration and expiration and chest wall expansion, no use of accessory muscles to breathe, no stridor.  NEUROLOGICAL: Patient is ambulatory without assist. Mentation is clear. Answering questions appropriately.          Assessment/Plan   ASSESSMENT:   71 year old female with resolving episode of acute sinusitis following antibiotic therapy with persisting nasal discomfort. Area of crusting located along the right anterior septum and right middle turbinate. Recommended treatment with saline irrigations and mupirocin ointment.  11/16/22- Improved nasal exam with the exception of inferior turbinate hypertrophy and mucoid secretions throughout. Rec. flonase sensimist and saline gel.  3/15/23- Doing well with saline. Will resume flonase sensimist in spring. Allergy panel ordered.  10/24/23- Acute flare of right sided facial pressure. Concern for TMJ as contributor. Rec. Resume fluticasone and gel. TMJ strategies.   4/23/24- Doing very well. Using Xyzal regularly. No nasal sprays.     PLAN:   1. Anterior exam: left dev., ITH  2. I recommended the patient use a humidifier in the bedroom and in the house  3. Patient may continue to use Xyzal.  4. Recommended that she irrigate with saline as needed.  5. Patient can follow up with Kylee Mckeon CNP for cerumen removal.  6. Patient advised to follow-up with me as needed. Patient agrees with established plan of care and all questions were answered.

## 2024-04-25 ENCOUNTER — TREATMENT (OUTPATIENT)
Dept: PHYSICAL THERAPY | Facility: CLINIC | Age: 74
End: 2024-04-25
Payer: MEDICARE

## 2024-04-25 DIAGNOSIS — M25.552 PAIN OF LEFT HIP JOINT: ICD-10-CM

## 2024-04-25 PROCEDURE — 97140 MANUAL THERAPY 1/> REGIONS: CPT | Mod: GP | Performed by: PHYSICAL THERAPIST

## 2024-04-25 PROCEDURE — 97110 THERAPEUTIC EXERCISES: CPT | Mod: GP | Performed by: PHYSICAL THERAPIST

## 2024-04-25 NOTE — PROGRESS NOTES
"  Physical Therapy  Physical Therapy Treatment Note    Patient Name: Victorina Velez  MRN: [id number]  Today's Date: 4/25/2024  Time Calculation  Start Time: 1015  Stop Time: 1105  Time Calculation (min): 50 min  PT Therapeutic Procedures Time Entry  Manual Therapy Time Entry: 24  Therapeutic Exercise Time Entry: 12        Insurance:  Visit number: 4 of med nec  Authorization info: no auth   Insurance Type: Payor: MEDICARE / Plan: MEDICARE PART A AND B / Product Type: *No Product type* /   Current Problem  1. Pain of left hip joint  Follow Up In Physical Therapy        General    2/19/24 for L posterior THR by Dr. Ruiz.  Past Medical History Relevant to Rehab: breast CA with lumpectomy, dyspnea, metatarsal bone fracture, adenocarcinoma of lung with lung lobectomy, hypothyroidism, UTI, pneumonia, mastectomy, hip surgery, anxiety, OP, HA, elbow surgery  Family/Caregiver Present: No  Precautions:   MITRA 2/19/24 posterior hip precautions  Subjective:   Subjective   7/10 pain walking in L SI 2 days ago.   Pain   0/10 at rest. Walking 5/10 today.   Objective:   B knee valgus with ambulation and decreased stride length and hip extension L.   Treatments:   Access Code [email or web address]  THERE EX  Clamshell 10 x 5\" - L SI symptoms  Iso abd GTB 10 x 10\"  Iso add ball 10 x 10\"  MANUAL  IASTM roller to left glute med, ITB, VL, and quad. TP release and pressure oer L SI joint in side lying  Proper wear and removal of k-tape explained including skin-care and precautions.   Decompression over L SI  NMRE  THERE ACT    Assessment:  Pt had fair tolerance to therapy today. There ex produced soreness and aching in L SI region. Exercises held and pt educated not to perform exercises that produce familiar pain. Discussed sleeping posture and elevating legs with pillows under knees. Pt educated on prolonged positioning in standing and sitting can cause L SI pain. Manual therapies performed today for symptom relief with improvement      Plan: Pt " educated to hold HEP for 1-2 days until symptoms calm down. Possible thermaprobe NV. If pt feeling better resume there ex and progress home exercises for function.      Goals:   STG  Pt will demonstrate an excellent understanding of MITRA precautions and time frames in 2 weeks  The patient will report an increase in LEFS score to 45 in 4 weeks Demonstrating a clinically significant change (MCID 9).   Pt will be able to perform STS without UE support in 4 weeks.  LTG  The patient will demonstrate a decrease in average TUG time to <10 seconds to demonstrate a decreased fall prediction rate.   The patient will perform a single leg stance >10 seconds to demonstrate no fall risk bilaterally.  The patient will report an increase in LEFS score to 55. Demonstrating a clinically significant change (MCID 9).   Pt will be able to walk continuously for 30 mins for cardiovascular benefits - patient stated goal.     Erick Nicole, PT

## 2024-04-26 DIAGNOSIS — N95.2 VAGINAL ATROPHY: Primary | ICD-10-CM

## 2024-04-29 NOTE — PROGRESS NOTES
Physical Therapy  Physical Therapy Treatment Note    Patient Name: Victorina Velez  MRN: [id number]  Today's Date: 4/30/2024  Time Calculation  Start Time: 0830  Stop Time: 0915  Time Calculation (min): 45 min  PT Therapeutic Procedures Time Entry  Manual Therapy Time Entry: 15  Therapeutic Exercise Time Entry: 8  PT Modalities Time Entry  E-Stim (Attended) Time Entry: 10     Insurance:  Visit number: 5 of med nec  Authorization info: no auth   Insurance Type: Payor: MEDICARE / Plan: MEDICARE PART A AND B / Product Type: *No Product type* /   Current Problem  1. Pain of left hip joint  Follow Up In Physical Therapy        General    2/19/24 for L posterior THR by Dr. Ruiz.  Past Medical History Relevant to Rehab: breast CA with lumpectomy, dyspnea, metatarsal bone fracture, adenocarcinoma of lung with lung lobectomy, hypothyroidism, UTI, pneumonia, mastectomy, hip surgery, anxiety, OP, HA, elbow surgery  Family/Caregiver Present: No  Precautions:   MITRA 2/19/24 posterior hip precautions    Subjective:   Subjective   Patient reports 5/10 today.  I am better today than last visit but still has L glut pain  Pain     0/10 at rest. Walking 5/10 today.   Objective:     B knee valgus with ambulation and decreased stride length and hip extension L.   Treatments:     Access Code [email or web address]    THERE EX (10')  Review HEP  Demod tennis ball against the wall  Gait with cane and heel lift placed  MANUAL 18  DTM to L glut, piriformis    Attended stim with thermaprobe x 10 min with 5 bar heating to L glut      Assessment:    Patient reported that her pain was gone in L glut/hip at end of session. Placed heel lift in R shoe to and patient reported that she feels much more even with amb and standing.     Plan:  Assess patient response to thermaprobe and placement of heel lift.  Review HEP     Goals:   STG  Pt will demonstrate an excellent understanding of MITRA precautions and time frames in 2 weeks  The patient will report an  increase in LEFS score to 45 in 4 weeks Demonstrating a clinically significant change (MCID 9).   Pt will be able to perform STS without UE support in 4 weeks.  LTG  The patient will demonstrate a decrease in average TUG time to <10 seconds to demonstrate a decreased fall prediction rate.   The patient will perform a single leg stance >10 seconds to demonstrate no fall risk bilaterally.  The patient will report an increase in LEFS score to 55. Demonstrating a clinically significant change (MCID 9).   Pt will be able to walk continuously for 30 mins for cardiovascular benefits - patient stated goal.     Coreen Davis, PTA

## 2024-04-30 ENCOUNTER — TREATMENT (OUTPATIENT)
Dept: PHYSICAL THERAPY | Facility: CLINIC | Age: 74
End: 2024-04-30
Payer: MEDICARE

## 2024-04-30 DIAGNOSIS — M25.552 PAIN OF LEFT HIP JOINT: Primary | ICD-10-CM

## 2024-04-30 PROCEDURE — 97032 APPL MODALITY 1+ESTIM EA 15: CPT | Mod: GP,CQ

## 2024-04-30 PROCEDURE — 97140 MANUAL THERAPY 1/> REGIONS: CPT | Mod: GP,CQ

## 2024-04-30 RX ORDER — ESTRADIOL 0.1 MG/G
CREAM VAGINAL
Qty: 42.5 G | Refills: 2 | Status: SHIPPED | OUTPATIENT
Start: 2024-04-30

## 2024-05-01 NOTE — PROGRESS NOTES
Physical Therapy  Physical Therapy Treatment Note    Patient Name: Victorina Velez  MRN: [id number]  Today's Date: 5/2/2024  Time Calculation  Start Time: 0915  Stop Time: 1000  Time Calculation (min): 45 min  PT Therapeutic Procedures Time Entry  Manual Therapy Time Entry: 20  Therapeutic Exercise Time Entry: 10  PT Modalities Time Entry  E-Stim (Attended) Time Entry: 10     Insurance:  Visit number: 6 of med nec  Authorization info: no auth   Insurance Type: Payor: MEDICARE / Plan: MEDICARE PART A AND B / Product Type: *No Product type* /   Current Problem  1. Pain of left hip joint  Follow Up In Physical Therapy        General    2/19/24 for L posterior THR by Dr. Ruiz.  Past Medical History Relevant to Rehab: breast CA with lumpectomy, dyspnea, metatarsal bone fracture, adenocarcinoma of lung with lung lobectomy, hypothyroidism, UTI, pneumonia, mastectomy, hip surgery, anxiety, OP, HA, elbow surgery  Family/Caregiver Present: No  Precautions:   MITRA 2/19/24 posterior hip precautions    Subjective:   Subjective   Patient reports that she has back pain in AM.  Trying to figure out how to decrease.  Heel lift has helped a lot.  Decreased pain after last visit    Pain     0/10 at rest. Walking 2-3/10 today.   Objective:     B knee valgus with ambulation and decreased stride length and hip extension L.   Treatments:     Access Code [email or web address]    THERE EX (10')  Review HEP  Standing hamstring curls x 10   LTR x 10 ea  Sleeping position reviewed    MANUAL 20  DTM to L glut, piriformis    Attended stim with thermaprobe x 10 min with 5 bar heating to L glut      Assessment:    Patient continues to have decreased pain in hip.  Will try to modify sleeping positions to minimize ankle swelling and avoid LB pain.       Plan:     Continue with thermaprobe, manual work and strengthening as tolerated.  Goals:   STG  Pt will demonstrate an excellent understanding of MITRA precautions and time frames in 2 weeks  The patient will  report an increase in LEFS score to 45 in 4 weeks Demonstrating a clinically significant change (MCID 9).   Pt will be able to perform STS without UE support in 4 weeks.  LTG  The patient will demonstrate a decrease in average TUG time to <10 seconds to demonstrate a decreased fall prediction rate.   The patient will perform a single leg stance >10 seconds to demonstrate no fall risk bilaterally.  The patient will report an increase in LEFS score to 55. Demonstrating a clinically significant change (MCID 9).   Pt will be able to walk continuously for 30 mins for cardiovascular benefits - patient stated goal.     Coreen Davis, PT

## 2024-05-02 ENCOUNTER — TREATMENT (OUTPATIENT)
Dept: PHYSICAL THERAPY | Facility: CLINIC | Age: 74
End: 2024-05-02
Payer: MEDICARE

## 2024-05-02 DIAGNOSIS — M25.552 PAIN OF LEFT HIP JOINT: Primary | ICD-10-CM

## 2024-05-02 PROCEDURE — 97032 APPL MODALITY 1+ESTIM EA 15: CPT | Mod: GP,CQ

## 2024-05-02 PROCEDURE — 97110 THERAPEUTIC EXERCISES: CPT | Mod: GP,CQ

## 2024-05-02 PROCEDURE — 97140 MANUAL THERAPY 1/> REGIONS: CPT | Mod: GP,CQ

## 2024-05-05 NOTE — PROGRESS NOTES
Subjective   Reason for Visit: Victorina Velez is an 73 y.o. female here for a Medicare Wellness visit.               HPI  Since the patient's left total hip replacement in mid February, she reports experiencing intermittent episodes of aching pain in the left lower back region radiating to the lateral surface of the mid calf region.  She reports that the episodes are initially precipitated by almost any movement but then seem to decrease in intensity with movement.  She does report associated left lower extremity weakness.  She reports no left lower extremity paresthesias.  No other associated symptoms.    The patient does report a history of intermittent episodes of swelling in the ankles and lower legs bilaterally times a few weeks up until the past few days.  She reports that the swelling was not present in the morning but would develop as the day goes on.  She reports no other associated symptoms.  She reports that she has not noted swelling with elevation of the legs and with wearing support stockings.    Over the past 6 months, the patient reports that she has experienced dyspnea carrying groceries up stairs.  She reports no associated symptoms.  Over the past 6 months, she reports that she has noted increased dyspnea with exertion.    Over the past year, the patient reports continued chronic nasal congestion and rhinorrhea.  She reports no associated symptoms.  She does report less nasal congestion and rhinorrhea since she began using Xyzal 5 mg p.o. nightly 3 weeks ago.    She also reports a history of periodic popping in both ears which developed after her hip replacement.  She reports no associated symptoms.    The patient over the past year reports continued chronic constant thickness and irritation in the lower throat region.  She reports no associated symptoms.  She does report a decrease in thickness and irritation with use of Mucinex on a as needed basis.    Over the past year, the patient reports  continued chronic cough sometimes productive and when productive of productive of clear sputum.  She reports recently noting an increase in the frequency of cough but no change in sputum production.  No other associated symptoms.    Over the past year, the patient reports continued chronic intermittent episodes of itching located in the back of the head and continued chronic intermittent episodes of itching located over the upper and mid back regions.  She reports no associated symptoms..  Since beginning use of Xyzal, she reports a decrease in the frequency and intensity of the episodes.  No other new complaints    Pulse ox on room air today was 97%  Patient Care Team:  Raghavendra Chávez MD as PCP - General (Internal Medicine)  Asuncion Alonzo RN as Registered Nurse (Orthopaedic Surgery)     Review of Systems  All systems have been reviewed and are normal except as previously noted  Objective   Vitals:  There were no vitals taken for this visit.      Physical Exam  Head-palpation revealed no tenderness over the maxillary or frontal sinuses.  Eyes- extraocular movements intact ;pupils equal and reactive to light; fundi revealed good retinal color, no hemorrhages or exudates  Ears- palpation of pinnas and traguses revealed no tenderness. External auditory canals not erythematous or swollen.  TMs not visualized secondary to impacted cerumen  Nose-turbinates not erythematous or swollen; nasal septal deviation noted to the left  Mouth -no xerostomia noted. Posterior pharynx is not erythematous or swollen. Tonsillar pillars appeared normal no exudates  Neck no lymphadenopathy. Thyroid gland not enlarged. , No bruits.  Lungs-clear to auscultation bilaterally  Cardiac-rate normal rhythm regular no murmurs no JVD  Abdomen-soft nondistended normal active bowel sounds. Palpation revealed no tenderness or masses  Pulses 2+ bilaterally in the upper extremities, 0 bilaterally in the lower extremities    Extremities-no peripheral  edema  Musculoskeletal  CMC joint right thumb-no erythema or swelling.  Full range of motion with stiffness but no pain noted in all directions of motion..  Palpation revealed no tenderness or increase in warmth  Lumbar spine-no erythema or swelling. Full range of motion with pain noted on bending to the left.  Full range of motion with no pain or stiffness noted in all other directions of motion.  Palpation revealed no tenderness or increase in warmth    Skin-yellowish discoloration of the toenails of both first toes noted bilaterally.  Yellowish discoloration also noted left fifth toe.  No erythema or eruptions noted.     Neurologic  Mental status-alert and oriented x3   Cranial nerves-2 through 12 grossly intact, no visual field abnormalities  Motor-no pronator drift noted, strength-5/5 in all muscle groups tested, except plantar flexors of both ankles 4/5, no tremor noted.  No bradykinesia noted.  No rigidity noted.  Negative pull test  Sensory-Light touch sensation fully intact  Pinprick sensation slightly diminished forefoot region right foot  Vibratory sensation not present distal to the right knee, markedly diminished distal to the left knee, diminished in both knees bilaterally right greater than left  Cerebellar-no truncal ataxia, good coordination finger-nose testing,, good coordination heel-to-shin testing, normal rapid alternating movements  Romberg negative, mildly decreased coordination in tandem gait  Reflexes-  ankle jerks 0/4 bilaterally, all other reflexes tested 2+/4 bilaterally       Assessment/Plan   Adenocarcinoma of the left lung  Ductal carcinoma in situ left breast  COPD  Dyslipidemia  Hypothyroidism  Vitamin D deficiency  Osteoarthritis of the left hip status post left total hip replacement  Assessment  Noncardiac chest pain  History of intermittent episodes of swelling in the ankles and lower legs-probably secondary to venous insufficiency  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  Chronic dyspnea on exertion--may be secondary to untreated obstructive/restrictive lung disease in the setting of lack of conditioning  Obstructive/restrictive lung disease.  Adenocarcinoma of the right lung status post right lower lobe lobectomy December 18, 2014-mediastinal lymph node dissection status post chemoradiation.  Bronchiectasis right perihilar region  Right pleural effusion-chronic  Radiation pneumonitis August 2015  COVID-19 December 2021  Chronic nasal congestion and rhinorrhea-probably secondary to structural abnormality, allergic rhinitis, nonallergic rhinitis  Deviated nasal septum to the left  Periodic popping in both ears-May be secondary to bilateral eustachian tube dysfunction secondary to rhinitis.  Impacted cerumen is a possible etiology as well  Chronic constant thickness and irritation located in the lower throat-unsure of etiology. May be secondary to gastroesophageal reflux..  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 secondary overactive bladder  Chronic intermittent episodes of urinary incontinence only occurring when the patient waits too long? Secondary to urge incontinence  Ovarian cyst  Chronic intermittent episodes of pain-unable to describe-stiffness at the CMC joint of the right thumb-probably secondary to osteoarthritis  Osteoarthritis of the right hand and right wrist  Fracture of the left ulna and left radius-status post repair June  2015  Paraneoplastic syndrome.  Osteoarthritis of the left hip status post left total hip replacement February 19, 2024  Right hip greater trochanteric bursitis.  Status post right total hip replacement 2002.  Distal metatarsal fractures right fourth and fifth toes January 2014.  Prediabetes  Osteoporosis  Hypothyroidism  Hyperlipidemia  Acne rosacea  Chronic presence of a cystic lesion on the right elbow-likely represents a epidermal cyst  Ruptured epidermal cyst right elbow June 2020  Chronic intermittent itching noted in the medial corners of both eyes-may be secondary to dry eye syndrome  Bilateral cataracts status post removal of cataract right eye April 27, 2022  Bilateral posterior vitreous detachments  Dry eye syndrome...  Chronic worsening memory for names-may be secondary to age-related cognitive decline  Chronic intermittent episodes of itching located in the back of the head-unsure of etiology.  May be secondary to occipital neuralgia  Chronic intermittent episodes of itching located over the upper and mid back regions-unsure of etiology.  Chronic constant numbness noted underneath the third, fourth and fifth toes of both feet-unsure of etiology. May be secondary to peripheral neuropathy.  Chronic intermittent episodes of numbness in the fingers of both hands only occurring when the patient raises her arms when sleeping-may be secondary to bilateral compression neuropathy possibly bilateral ulnar neuropathy  Multilevel cervical spondylosis  Scoliosis thoracic spine..  Intermittent episodes of aching pain in the left lower back region radiating to the lateral surface of the left mid calf region-May be secondary to a left L4-L5 radiculopathy secondary to central canal stenosis lumbar spine, left-sided neuroforaminal stenosis lumbar spine  Central canal stenosis of the lumbar spine.  Bilateral neuroforaminal stenosis of the lumbar spine.  Chronic insomnia manifested as difficulty staying asleep-probably  secondary to primary insomnia, anxiety.  Anxiety        Plan   . Obtain urinalysis today.  Obtain CBC differential, CMP, fasting lipid profile, TSH, hemoglobin A1c, free T3, free T4, vitamin B12 level, vitamin D level, hemoglobin A1c today  Obtain repeat CT scan of the chest in August 2023.  I did again tell the patient that she could use albuterol inhaler 2 puffs every 6 hours as needed.       I have referred the patient to ENT for removal of impacted cerumen.  I recommended that the patient begin use of Advil 400 mg plus Tylenol 1000 mg p.o. every 6 hours as needed pain.  I recommended that she apply Voltaren gel to the CMC joint of the right thumb and to the left lower back region every 6 hours as needed.  I told the patient to continue to wear support stockings and elevate her legs is much as possible.

## 2024-05-05 NOTE — PROGRESS NOTES
Physical Therapy  Physical Therapy Treatment Note    Patient Name: Victorina Velez  MRN: [id number]  Today's Date: 5/6/2024  Time Calculation  Start Time: 0945  Stop Time: 1030  Time Calculation (min): 45 min  PT Therapeutic Procedures Time Entry  Manual Therapy Time Entry: 20  Therapeutic Exercise Time Entry: 15  PT Modalities Time Entry  E-Stim (Attended) Time Entry: 10     Insurance:  Visit number: 7 of med nec  Authorization info: no auth   Insurance Type: Payor: MEDICARE / Plan: MEDICARE PART A AND B / Product Type: *No Product type* /   Current Problem  1. Pain of left hip joint  Follow Up In Physical Therapy        General    2/19/24 for L posterior THR by Dr. Ruiz.  Past Medical History Relevant to Rehab: breast CA with lumpectomy, dyspnea, metatarsal bone fracture, adenocarcinoma of lung with lung lobectomy, hypothyroidism, UTI, pneumonia, mastectomy, hip surgery, anxiety, OP, HA, elbow surgery  Family/Caregiver Present: No  Precautions:   MITRA 2/19/24 posterior hip precautions    Subjective:   Subjective   Patient reports that she took the mattress cover off of bed last night and I think I wrenched my back.  The hip and lateral thigh     Pain     0/10 at rest. Walking 2-3/10 today.   Objective:     B knee valgus with ambulation and decreased stride length and hip extension L.   Treatments:     Access Code [email or web address]    THERE EX (10')  Review HEP  Standing hamstring curls x 10   Standing hip abd x 10   LTR x 10 ea  SKTC with use of towel to 90* only  PROM to hip , flex to 90*, abd, ER   Standing adduction stretch    MANUAL 20  DTM to L glut, piriformis  Rollerstick to ITB    Attended stim with thermaprobe x 10 min with 5 bar heating to L glut      Assessment:    Patient continues to have decreased pain in hip and less tightness in glut following manual work.  Recommended trying heating pad at home to relax muscles.     Plan:   Continue with thermaprobe, manual work and strengthening as  tolerated.    Goals:   STG  Pt will demonstrate an excellent understanding of MITRA precautions and time frames in 2 weeks  The patient will report an increase in LEFS score to 45 in 4 weeks Demonstrating a clinically significant change (MCID 9).   Pt will be able to perform STS without UE support in 4 weeks.  LTG  The patient will demonstrate a decrease in average TUG time to <10 seconds to demonstrate a decreased fall prediction rate.   The patient will perform a single leg stance >10 seconds to demonstrate no fall risk bilaterally.  The patient will report an increase in LEFS score to 55. Demonstrating a clinically significant change (MCID 9).   Pt will be able to walk continuously for 30 mins for cardiovascular benefits - patient stated goal.     Coreen Davis, PT

## 2024-05-06 ENCOUNTER — TREATMENT (OUTPATIENT)
Dept: PHYSICAL THERAPY | Facility: CLINIC | Age: 74
End: 2024-05-06
Payer: MEDICARE

## 2024-05-06 DIAGNOSIS — M25.552 PAIN OF LEFT HIP JOINT: Primary | ICD-10-CM

## 2024-05-06 PROCEDURE — 97140 MANUAL THERAPY 1/> REGIONS: CPT | Mod: GP,CQ

## 2024-05-06 PROCEDURE — 97110 THERAPEUTIC EXERCISES: CPT | Mod: GP,CQ

## 2024-05-06 PROCEDURE — 97032 APPL MODALITY 1+ESTIM EA 15: CPT | Mod: GP,CQ

## 2024-05-07 ENCOUNTER — OFFICE VISIT (OUTPATIENT)
Dept: PRIMARY CARE | Facility: CLINIC | Age: 74
End: 2024-05-07
Payer: MEDICARE

## 2024-05-07 ENCOUNTER — LAB (OUTPATIENT)
Dept: LAB | Facility: LAB | Age: 74
End: 2024-05-07
Payer: MEDICARE

## 2024-05-07 VITALS
HEIGHT: 66 IN | WEIGHT: 142 LBS | BODY MASS INDEX: 22.82 KG/M2 | SYSTOLIC BLOOD PRESSURE: 100 MMHG | TEMPERATURE: 98.2 F | DIASTOLIC BLOOD PRESSURE: 84 MMHG | HEART RATE: 92 BPM

## 2024-05-07 DIAGNOSIS — C34.92 ADENOCARCINOMA OF LEFT LUNG (MULTI): ICD-10-CM

## 2024-05-07 DIAGNOSIS — E03.9 HYPOTHYROIDISM (ACQUIRED): ICD-10-CM

## 2024-05-07 DIAGNOSIS — E55.9 VITAMIN D DEFICIENCY: ICD-10-CM

## 2024-05-07 DIAGNOSIS — R09.82 POSTNASAL DRIP: ICD-10-CM

## 2024-05-07 DIAGNOSIS — J34.2 DEVIATED NASAL SEPTUM: ICD-10-CM

## 2024-05-07 DIAGNOSIS — R73.03 PREDIABETES: ICD-10-CM

## 2024-05-07 DIAGNOSIS — E78.5 DYSLIPIDEMIA: ICD-10-CM

## 2024-05-07 DIAGNOSIS — J44.9 CHRONIC OBSTRUCTIVE PULMONARY DISEASE, UNSPECIFIED COPD TYPE (MULTI): ICD-10-CM

## 2024-05-07 DIAGNOSIS — R09.81 NASAL CONGESTION: ICD-10-CM

## 2024-05-07 DIAGNOSIS — C34.11 MALIGNANT NEOPLASM OF UPPER LOBE OF RIGHT LUNG (MULTI): ICD-10-CM

## 2024-05-07 DIAGNOSIS — C34.11 MALIGNANT NEOPLASM OF UPPER LOBE OF RIGHT LUNG (MULTI): Primary | ICD-10-CM

## 2024-05-07 DIAGNOSIS — Z00.00 HEALTH MAINTENANCE EXAMINATION: ICD-10-CM

## 2024-05-07 DIAGNOSIS — Z00.00 ROUTINE GENERAL MEDICAL EXAMINATION AT A HEALTH CARE FACILITY: ICD-10-CM

## 2024-05-07 DIAGNOSIS — R73.01 IFG (IMPAIRED FASTING GLUCOSE): ICD-10-CM

## 2024-05-07 DIAGNOSIS — D05.12 DUCTAL CARCINOMA IN SITU (DCIS) OF LEFT BREAST: ICD-10-CM

## 2024-05-07 LAB
25(OH)D3 SERPL-MCNC: 91 NG/ML (ref 30–100)
ALBUMIN SERPL BCP-MCNC: 4.2 G/DL (ref 3.4–5)
ALP SERPL-CCNC: 86 U/L (ref 33–136)
ALT SERPL W P-5'-P-CCNC: 17 U/L (ref 7–45)
ANION GAP SERPL CALC-SCNC: 13 MMOL/L (ref 10–20)
AST SERPL W P-5'-P-CCNC: 17 U/L (ref 9–39)
BASOPHILS # BLD AUTO: 0.02 X10*3/UL (ref 0–0.1)
BASOPHILS NFR BLD AUTO: 0.4 %
BILIRUB SERPL-MCNC: 1.2 MG/DL (ref 0–1.2)
BUN SERPL-MCNC: 16 MG/DL (ref 6–23)
CALCIUM SERPL-MCNC: 9.5 MG/DL (ref 8.6–10.6)
CHLORIDE SERPL-SCNC: 105 MMOL/L (ref 98–107)
CHOLEST SERPL-MCNC: 300 MG/DL (ref 0–199)
CHOLESTEROL/HDL RATIO: 5.2
CO2 SERPL-SCNC: 28 MMOL/L (ref 21–32)
CREAT SERPL-MCNC: 0.73 MG/DL (ref 0.5–1.05)
CRP SERPL HS-MCNC: 3.3 MG/L
EGFRCR SERPLBLD CKD-EPI 2021: 86 ML/MIN/1.73M*2
EOSINOPHIL # BLD AUTO: 0.07 X10*3/UL (ref 0–0.4)
EOSINOPHIL NFR BLD AUTO: 1.3 %
ERYTHROCYTE [DISTWIDTH] IN BLOOD BY AUTOMATED COUNT: 13.6 % (ref 11.5–14.5)
EST. AVERAGE GLUCOSE BLD GHB EST-MCNC: 97 MG/DL
GLUCOSE SERPL-MCNC: 106 MG/DL (ref 74–99)
HBA1C MFR BLD: 5 %
HCT VFR BLD AUTO: 42.4 % (ref 36–46)
HDLC SERPL-MCNC: 58 MG/DL
HGB BLD-MCNC: 13.1 G/DL (ref 12–16)
IMM GRANULOCYTES # BLD AUTO: 0.02 X10*3/UL (ref 0–0.5)
IMM GRANULOCYTES NFR BLD AUTO: 0.4 % (ref 0–0.9)
LDLC SERPL CALC-MCNC: 201 MG/DL
LYMPHOCYTES # BLD AUTO: 1.27 X10*3/UL (ref 0.8–3)
LYMPHOCYTES NFR BLD AUTO: 23.6 %
MCH RBC QN AUTO: 28.4 PG (ref 26–34)
MCHC RBC AUTO-ENTMCNC: 30.9 G/DL (ref 32–36)
MCV RBC AUTO: 92 FL (ref 80–100)
MONOCYTES # BLD AUTO: 0.56 X10*3/UL (ref 0.05–0.8)
MONOCYTES NFR BLD AUTO: 10.4 %
NEUTROPHILS # BLD AUTO: 3.43 X10*3/UL (ref 1.6–5.5)
NEUTROPHILS NFR BLD AUTO: 63.9 %
NON HDL CHOLESTEROL: 242 MG/DL (ref 0–149)
NRBC BLD-RTO: 0 /100 WBCS (ref 0–0)
PLATELET # BLD AUTO: 311 X10*3/UL (ref 150–450)
POC APPEARANCE, URINE: CLEAR
POC BILIRUBIN, URINE: NEGATIVE
POC BLOOD, URINE: ABNORMAL
POC COLOR, URINE: YELLOW
POC GLUCOSE, URINE: NEGATIVE MG/DL
POC KETONES, URINE: NEGATIVE MG/DL
POC LEUKOCYTES, URINE: NEGATIVE
POC NITRITE,URINE: NEGATIVE
POC PH, URINE: 7 PH
POC PROTEIN, URINE: NEGATIVE MG/DL
POC SPECIFIC GRAVITY, URINE: 1.01
POC UROBILINOGEN, URINE: 0.2 EU/DL
POTASSIUM SERPL-SCNC: 3.9 MMOL/L (ref 3.5–5.3)
PROT SERPL-MCNC: 6.7 G/DL (ref 6.4–8.2)
RBC # BLD AUTO: 4.62 X10*6/UL (ref 4–5.2)
SODIUM SERPL-SCNC: 142 MMOL/L (ref 136–145)
TRIGL SERPL-MCNC: 205 MG/DL (ref 0–149)
TSH SERPL-ACNC: 2.55 MIU/L (ref 0.44–3.98)
VIT B12 SERPL-MCNC: 723 PG/ML (ref 211–911)
VLDL: 41 MG/DL (ref 0–40)
WBC # BLD AUTO: 5.4 X10*3/UL (ref 4.4–11.3)

## 2024-05-07 PROCEDURE — 36415 COLL VENOUS BLD VENIPUNCTURE: CPT

## 2024-05-07 PROCEDURE — 80053 COMPREHEN METABOLIC PANEL: CPT

## 2024-05-07 PROCEDURE — 99214 OFFICE O/P EST MOD 30 MIN: CPT | Performed by: INTERNAL MEDICINE

## 2024-05-07 PROCEDURE — 86141 C-REACTIVE PROTEIN HS: CPT

## 2024-05-07 PROCEDURE — 80061 LIPID PANEL: CPT

## 2024-05-07 PROCEDURE — 83036 HEMOGLOBIN GLYCOSYLATED A1C: CPT

## 2024-05-07 PROCEDURE — 85025 COMPLETE CBC W/AUTO DIFF WBC: CPT

## 2024-05-07 PROCEDURE — 82607 VITAMIN B-12: CPT

## 2024-05-07 PROCEDURE — 84443 ASSAY THYROID STIM HORMONE: CPT

## 2024-05-07 PROCEDURE — 81002 URINALYSIS NONAUTO W/O SCOPE: CPT | Performed by: INTERNAL MEDICINE

## 2024-05-07 PROCEDURE — 1159F MED LIST DOCD IN RCRD: CPT | Performed by: INTERNAL MEDICINE

## 2024-05-07 PROCEDURE — 82306 VITAMIN D 25 HYDROXY: CPT

## 2024-05-07 PROCEDURE — 1160F RVW MEDS BY RX/DR IN RCRD: CPT | Performed by: INTERNAL MEDICINE

## 2024-05-07 PROCEDURE — G0439 PPPS, SUBSEQ VISIT: HCPCS | Performed by: INTERNAL MEDICINE

## 2024-05-07 PROCEDURE — 1170F FXNL STATUS ASSESSED: CPT | Performed by: INTERNAL MEDICINE

## 2024-05-07 RX ORDER — LEVOCETIRIZINE DIHYDROCHLORIDE 5 MG/1
5 TABLET, FILM COATED ORAL EVERY EVENING
COMMUNITY

## 2024-05-07 RX ORDER — OMEPRAZOLE 20 MG/1
40 CAPSULE, DELAYED RELEASE ORAL DAILY
Qty: 60 CAPSULE | Refills: 5 | Status: SHIPPED | OUTPATIENT
Start: 2024-05-07 | End: 2024-11-03

## 2024-05-07 ASSESSMENT — ACTIVITIES OF DAILY LIVING (ADL)
DOING_HOUSEWORK: INDEPENDENT
MANAGING_FINANCES: INDEPENDENT
GROCERY_SHOPPING: INDEPENDENT
BATHING: INDEPENDENT
DRESSING: INDEPENDENT
TAKING_MEDICATION: INDEPENDENT

## 2024-05-07 ASSESSMENT — ENCOUNTER SYMPTOMS
OCCASIONAL FEELINGS OF UNSTEADINESS: 1
LOSS OF SENSATION IN FEET: 1
DEPRESSION: 0

## 2024-05-08 ENCOUNTER — TELEPHONE (OUTPATIENT)
Dept: PRIMARY CARE | Facility: CLINIC | Age: 74
End: 2024-05-08
Payer: MEDICARE

## 2024-05-08 DIAGNOSIS — E78.5 DYSLIPIDEMIA: Primary | ICD-10-CM

## 2024-05-08 RX ORDER — EZETIMIBE 10 MG/1
10 TABLET ORAL DAILY
Qty: 90 EACH | Refills: 1 | Status: SHIPPED | OUTPATIENT
Start: 2024-05-08 | End: 2024-11-04

## 2024-05-08 NOTE — PROGRESS NOTES
"  Physical Therapy  Physical Therapy Treatment Note    Patient Name: Victorina Velez  MRN: [id number]  Today's Date: 5/9/2024  Time Calculation  Start Time: 1000  Stop Time: 1055  Time Calculation (min): 55 min  PT Therapeutic Procedures Time Entry  Manual Therapy Time Entry: 15  Therapeutic Exercise Time Entry: 28  PT Modalities Time Entry  E-Stim (Attended) Time Entry: 10     Insurance:  Visit number: 8 of med nec  Authorization info: no auth   Insurance Type: Payor: MEDICARE / Plan: MEDICARE PART A AND B / Product Type: *No Product type* /   Current Problem  1. Pain of left hip joint  Follow Up In Physical Therapy        General    2/19/24 for L posterior THR by Dr. Ruiz.  Past Medical History Relevant to Rehab: breast CA with lumpectomy, dyspnea, metatarsal bone fracture, adenocarcinoma of lung with lung lobectomy, hypothyroidism, UTI, pneumonia, mastectomy, hip surgery, anxiety, OP, HA, elbow surgery  Family/Caregiver Present: No  Precautions:   MITRA 2/19/24 posterior hip precautions    Subjective:   Subjective   Im starting to feel a little better.  Im trying to walk a little more at home without the cane.  I still have a lot of tightness in L glut.  Using Arnica cream at night.  Swelling in ankles has decreased since switching sleeping position.      Pain     0/10 at rest. Walking 2-3/10 today.   Objective:     B knee valgus with ambulation and decreased stride length and hip extension L.   Treatments:     Access Code [email or web address] (updated 5/8/24)    THERE EX (25')  Review HEP  Supine hip add 5\" x 10   Supine hip abd  Standing hamstring curls x 10   Standing hip abd x 10   LTR x 10 ea  SKTC with use of towel to 90* only  PROM to hip , flex to 90*, abd, ER   Standing adduction stretch  Review HEP updated and printed/issued    MANUAL 15  DTM to L glut, piriformis      Attended stim with thermaprobe x 10 min with 5 bar heating to L glut      Assessment:    Patient continues to have decreased pain in hip and less " tightness in glut following manual work.  She is able to tolerate progression of strengthening exercises,     Plan:   Continue with thermaprobe, manual work and strengthening as tolerated.    Goals:   STG  Pt will demonstrate an excellent understanding of MITRA precautions and time frames in 2 weeks  The patient will report an increase in LEFS score to 45 in 4 weeks Demonstrating a clinically significant change (MCID 9).   Pt will be able to perform STS without UE support in 4 weeks.  LTG  The patient will demonstrate a decrease in average TUG time to <10 seconds to demonstrate a decreased fall prediction rate.   The patient will perform a single leg stance >10 seconds to demonstrate no fall risk bilaterally.  The patient will report an increase in LEFS score to 55. Demonstrating a clinically significant change (MCID 9).   Pt will be able to walk continuously for 30 mins for cardiovascular benefits - patient stated goal.     Coreen Davis, PT

## 2024-05-08 NOTE — TELEPHONE ENCOUNTER
Labs reviewed.  The patient did have some myalgias when taking statins before she was diagnosed with breast cancer and thus we have avoided statin therapy.  I will start the patient on Zetia 10 mg daily.  I will obtain a fasting lipid profile before the patient's CT scan of the chest later this summer

## 2024-05-09 ENCOUNTER — TREATMENT (OUTPATIENT)
Dept: PHYSICAL THERAPY | Facility: CLINIC | Age: 74
End: 2024-05-09
Payer: MEDICARE

## 2024-05-09 DIAGNOSIS — M25.552 PAIN OF LEFT HIP JOINT: Primary | ICD-10-CM

## 2024-05-09 PROCEDURE — 97140 MANUAL THERAPY 1/> REGIONS: CPT | Mod: GP,CQ

## 2024-05-09 PROCEDURE — 97032 APPL MODALITY 1+ESTIM EA 15: CPT | Mod: GP,CQ

## 2024-05-09 PROCEDURE — 97110 THERAPEUTIC EXERCISES: CPT | Mod: GP,CQ

## 2024-05-12 NOTE — PROGRESS NOTES
"  Physical Therapy  Physical Therapy Treatment Note    Patient Name: Victorina Velez  MRN: [id number]  Today's Date: 5/14/2024  Time Calculation  Start Time: 1000  Stop Time: 1045  Time Calculation (min): 45 min  PT Therapeutic Procedures Time Entry  Manual Therapy Time Entry: 10  Therapeutic Exercise Time Entry: 23  PT Modalities Time Entry  E-Stim (Attended) Time Entry: 10     Insurance:  Visit number: 9 of med nec  Authorization info: no auth   Insurance Type: Payor: MEDICARE / Plan: MEDICARE PART A AND B / Product Type: *No Product type* /   Current Problem  1. Pain of left hip joint  Follow Up In Physical Therapy        General    2/19/24 for L posterior THR by Dr. Ruiz.  Past Medical History Relevant to Rehab: breast CA with lumpectomy, dyspnea, metatarsal bone fracture, adenocarcinoma of lung with lung lobectomy, hypothyroidism, UTI, pneumonia, mastectomy, hip surgery, anxiety, OP, HA, elbow surgery  Family/Caregiver Present: No  Precautions:   MITRA 2/19/24 posterior hip precautions    Subjective:   Subjective   Patient reports feeling better and feeling more like \"herself\". Feels some tightness near her incision. She feels the heat has been helping her with her pain.  I haven't had to use the Arnica cream.  Pain    4/10 back pain today. No pain in hip, just stiffness.  Objective:     B knee valgus with ambulation and decreased stride length and hip extension L.   Treatments:     Access Code [email or web address] (updated 5/8/24)    THERE EX (23')  Review HEP  In gym:   Standing hamstring curls x 10   Standing hip abd x 10   Standing adduction stretch   4\" 6\" step ups x 10 each  Sit to stand from low table x 5     In room:  MANUAL 10'  DTM to L glut, piriformis      Attended stim with thermaprobe x 10 min with 5 bar heating to L glut      Assessment:    Patient continues to have decreased pain in hip and less tightness in glut following manual work.  She had some fatigue in L glut with SL standing exercises but no " increased pain.  She is able to tolerate progression of strengthening exercises,     Plan:   Continue with thermaprobe, manual work and strengthening as tolerated.    Goals:   STG  Pt will demonstrate an excellent understanding of MITRA precautions and time frames in 2 weeks  The patient will report an increase in LEFS score to 45 in 4 weeks Demonstrating a clinically significant change (MCID 9).   Pt will be able to perform STS without UE support in 4 weeks.  LTG  The patient will demonstrate a decrease in average TUG time to <10 seconds to demonstrate a decreased fall prediction rate.   The patient will perform a single leg stance >10 seconds to demonstrate no fall risk bilaterally.  The patient will report an increase in LEFS score to 55. Demonstrating a clinically significant change (MCID 9).   Pt will be able to walk continuously for 30 mins for cardiovascular benefits - patient stated goal.     Coreen Davis, PT

## 2024-05-13 ENCOUNTER — OFFICE VISIT (OUTPATIENT)
Dept: PRIMARY CARE | Facility: CLINIC | Age: 74
End: 2024-05-13
Payer: MEDICARE

## 2024-05-13 VITALS — HEART RATE: 80 BPM | SYSTOLIC BLOOD PRESSURE: 126 MMHG | DIASTOLIC BLOOD PRESSURE: 84 MMHG | TEMPERATURE: 96.8 F

## 2024-05-13 DIAGNOSIS — S91.209A TOENAIL AVULSION, INITIAL ENCOUNTER: Primary | ICD-10-CM

## 2024-05-13 PROCEDURE — 1036F TOBACCO NON-USER: CPT | Performed by: INTERNAL MEDICINE

## 2024-05-13 PROCEDURE — 1159F MED LIST DOCD IN RCRD: CPT | Performed by: INTERNAL MEDICINE

## 2024-05-13 PROCEDURE — 1160F RVW MEDS BY RX/DR IN RCRD: CPT | Performed by: INTERNAL MEDICINE

## 2024-05-13 PROCEDURE — 99212 OFFICE O/P EST SF 10 MIN: CPT | Performed by: INTERNAL MEDICINE

## 2024-05-13 RX ORDER — MUPIROCIN 20 MG/G
OINTMENT TOPICAL 2 TIMES DAILY
Qty: 22 G | Refills: 0 | Status: SHIPPED | OUTPATIENT
Start: 2024-05-13

## 2024-05-13 NOTE — PROGRESS NOTES
Subjective   Patient ID: Victorina Velez is a 73 y.o. female who presents for Nail Problem.    HPI   The patient reports a history of partial avulsion of the toenail right first toe the evening of May 9..  The base of the nail is still attached.  She does report associated pain in the region when pressure is applied.  She reports no fever, chills, drainage from the site, preceding trauma.  Review of Systems    Objective   Temp 36 °C (96.8 °F) (Temporal)     Physical Exam  Skin-toenail right first toe almost completely avulsed except at the base.  In the nailbed, there is no necrotic tissue, no drainage, no surrounding erythema, palpation revealed no tenderness or increase in warmth  Assessment/Plan        Assessment  Partial avulsion toenail right first toe-probably secondary to onychomycosis right first toenail  Plan  Refer to podiatrist for further evaluation and treatment.  
no

## 2024-05-14 ENCOUNTER — TREATMENT (OUTPATIENT)
Dept: PHYSICAL THERAPY | Facility: CLINIC | Age: 74
End: 2024-05-14
Payer: MEDICARE

## 2024-05-14 DIAGNOSIS — M25.552 PAIN OF LEFT HIP JOINT: Primary | ICD-10-CM

## 2024-05-14 PROCEDURE — 97140 MANUAL THERAPY 1/> REGIONS: CPT | Mod: GP,CQ

## 2024-05-14 PROCEDURE — 97110 THERAPEUTIC EXERCISES: CPT | Mod: GP,CQ

## 2024-05-14 PROCEDURE — 97032 APPL MODALITY 1+ESTIM EA 15: CPT | Mod: GP,CQ

## 2024-05-16 ENCOUNTER — TREATMENT (OUTPATIENT)
Dept: PHYSICAL THERAPY | Facility: CLINIC | Age: 74
End: 2024-05-16
Payer: MEDICARE

## 2024-05-16 DIAGNOSIS — M25.552 PAIN OF LEFT HIP JOINT: ICD-10-CM

## 2024-05-16 PROCEDURE — 97110 THERAPEUTIC EXERCISES: CPT | Mod: GP | Performed by: PHYSICAL THERAPIST

## 2024-05-16 NOTE — PROGRESS NOTES
"  Physical Therapy  Physical Therapy Treatment Note    Patient Name: Victorina Velez  MRN: [id number]  Today's Date: 5/16/2024  Time Calculation  Start Time: 1015  Stop Time: 1109  Time Calculation (min): 54 min  PT Therapeutic Procedures Time Entry  Therapeutic Exercise Time Entry: 30  PT Modalities Time Entry  E-Stim (Attended) Time Entry: 10     Insurance:  Visit number: 10 of med nec  Authorization info: no auth   Insurance Type: Payor: MEDICARE / Plan: MEDICARE PART A AND B / Product Type: *No Product type* /   Current Problem  1. Pain of left hip joint  Follow Up In Physical Therapy        General    2/19/24 for L posterior THR by Dr. Ruiz.  Past Medical History Relevant to Rehab: breast CA with lumpectomy, dyspnea, metatarsal bone fracture, adenocarcinoma of lung with lung lobectomy, hypothyroidism, UTI, pneumonia, mastectomy, hip surgery, anxiety, OP, HA, elbow surgery  Family/Caregiver Present: No  Precautions:   MITRA 2/19/24 posterior hip precautions  Subjective:   Subjective   Patient reports feeling better and feeling more like \"herself\". Feels some tightness near her incision. Pain is minimal and 0/10 at rest but reports stiffness in anterior hip. Reports improvement in transfers but still challenged with ambulation and needs SPC.   Pain  4/10 back pain today. No pain in hip, just stiffness.  Objective:   Lower Extremity Functional Movements 5/16/24  Transfers: STS from low table height able to perform with rocking  Gait: B knee valgus. Decreased stride length L. Decreased TKE L. Decreased hip extension B.   SL Balance: unable to perform on L side with pain. R side 4.5 seconds  STS UE support on table 9 reps 30 seconds.  5/16/24 12 reps soreness on L sided low back  STS without UE unable to perform safely  5/16/24 10 reps with rocking - need to re-test with no rocking permitted    MMT 5/16/24  Knee extension R 5  L 4  Knee flexion      R 5  L 4  Dorsiflex            R 5  L 4+    TUG with SPC: average " 11.75 seconds  5/16/24 11.8 seconds average with SPC  6 minute walk test: 3 mins 11 seconds stopped due to SOB and not wanting to push this with medical history. While ambulating over 1 minute anterior groin pain occurs.   5/16/24 6 min walk 870 feet  Outcome Measures:  Other Measures  Lower Extremity Funtional Score (LEFS): 48   Treatments:   Access Code [email or web address] (updated 5/8/24)  THERE EX  Re-assessment includes objective measure, subjective reports, functional outcomes, goal setting, and plan of care review.     MANUAL     Attended stim with thermaprobe x 10 min with 5 bar heating to L glut    Assessment:     Pt hsa made good functional progress with improvement in 6 min walk test and sit to stand function. See obj measures for improvements in these areas. Pt is not able to perform sit to stand wtihout UE support.  Pt continues to demonstrate abnormal gait pattern with lack of TKE and hip extension using SPC limiting her function and increasing fall risk. This along with significant strength deficits and pain and inability to perform SLB require continued skilled care. Prognosis is good based on pt motivation and current progress.   Plan:  OP PT Plan  PT Plan: Skilled PT  PT Frequency: 2 times per week  Duration: 6 weeks  Onset Date: 02/19/24  Certification Period Start Date: 05/16/24  Certification Period End Date: 08/14/24  Rehab Potential: Good  Plan of Care Agreement: Patient  Pt lacking knee, ankle and hip strength. Unable to perform SLB on  L LE.   Work on LE strength, hamstrings quad, dorsiflexors and Pfs. Improve glute med strength.    Goals:   STG  Pt will demonstrate an excellent understanding of MITRA precautions and time frames in 2 weeks  Goal achieved  The patient will report an increase in LEFS score to 45 in 4 weeks Demonstrating a clinically significant change (MCID 9).   Goal achieved  Pt will be able to perform STS without UE support in 4 weeks.  No progress  LTG  The patient will demonstrate a  decrease in average TUG time to <10 seconds to demonstrate a decreased fall prediction rate.   Minimal progress  The patient will perform a single leg stance >10 seconds to demonstrate no fall risk bilaterally.  The patient will report an increase in LEFS score to 55. Demonstrating a clinically significant change (MCID 9).   Pt will be able to walk continuously for 30 mins for cardiovascular benefits - patient stated goal.     Erick Nicole, PT

## 2024-05-16 NOTE — LETTER
May 16, 2024    Erick Nicole, PT  [address] [address]    Patient: Victorina Velez   YOB: 1950   Date of Visit: 5/16/2024       Dear [address]  [place],  [place] [address]    The attached plan of care is being sent to you because your patient’s medical reimbursement requires that you certify the plan of care. Your signature is required to allow uninterrupted insurance coverage.      You may indicate your approval by signing below and faxing this form back to us at Dept Fax: [phone].    Please call Dept: [phone] with any questions or concerns.    Thank you for this referral,        Erick Nicole PT  [address] [address]    Payer: Payor: MEDICARE / Plan: MEDICARE PART A AND B / Product Type: *No Product type* /                                                                         Date:     Dear Erick Nicole PT,     Re: Ms. Victorina Velez, MRN:[id number]    I certify that I have reviewed the attached plan of care and it is medically necessary for Ms. Victorina Velez (1950) who is under my care.          ______________________________________                    _________________  Provider name and credentials                                           Date and time                                                                                           Plan of Care 7/11/24   Effective from: 7/11/2024  Effective to: 8/14/2024    Plan ID: [id number]            Participants as of Finalize on 5/16/2024    Name Type Comments Contact Info    Dontae John MD Referring Provider  [phone]    Erick Nicole PT Physical Therapist  [phone]       Last Plan Note     Author: Erick Nicole PT Status: Incomplete Last edited: 5/16/2024 10:15 AM         Physical Therapy  Physical Therapy Treatment Note    Patient Name: Victorina Velez  MRN:  "[id number]  Today's Date: 5/16/2024  Time Calculation  Start Time: 1015  Stop Time: 1109  Time Calculation (min): 54 min  PT Therapeutic Procedures Time Entry  Therapeutic Exercise Time Entry: 30  PT Modalities Time Entry  E-Stim (Attended) Time Entry: 10     Insurance:  Visit number: 10 of med nec  Authorization info: no auth   Insurance Type: Payor: MEDICARE / Plan: MEDICARE PART A AND B / Product Type: *No Product type* /   Current Problem  1. Pain of left hip joint  Follow Up In Physical Therapy        General    2/19/24 for L posterior THR by Dr. Ruiz.  Past Medical History Relevant to Rehab: breast CA with lumpectomy, dyspnea, metatarsal bone fracture, adenocarcinoma of lung with lung lobectomy, hypothyroidism, UTI, pneumonia, mastectomy, hip surgery, anxiety, OP, HA, elbow surgery  Family/Caregiver Present: No  Precautions:   MITRA 2/19/24 posterior hip precautions  Subjective:   Subjective  Patient reports feeling better and feeling more like \"herself\". Feels some tightness near her incision. Pain is minimal and 0/10 at rest but reports stiffness in anterior hip. Reports improvement in transfers but still challenged with ambulation and needs SPC.   Pain  4/10 back pain today. No pain in hip, just stiffness.  Objective:   Lower Extremity Functional Movements 5/16/24  Transfers: STS from low table height able to perform with rocking  Gait: B knee valgus. Decreased stride length L. Decreased TKE L. Decreased hip extension B.   SL Balance: unable to perform on L side with pain. R side 4.5 seconds  STS UE support on table 9 reps 30 seconds.  5/16/24 12 reps soreness on L sided low back  STS without UE unable to perform safely  5/16/24 10 reps with rocking - need to re-test with no rocking permitted    MMT 5/16/24  Knee extension R 5  L 4  Knee flexion      R 5  L 4  Dorsiflex            R 5  L 4+    TUG with SPC: average 11.75 seconds  5/16/24 11.8 seconds average with SPC  6 minute walk test: 3 mins 11 " seconds stopped due to SOB and not wanting to push this with medical history. While ambulating over 1 minute anterior groin pain occurs.   5/16/24 6 min walk 870 feet  Outcome Measures:  Other Measures  Lower Extremity Funtional Score (LEFS): 48   Treatments:   Access Code [email or web address] (updated 5/8/24)  THERE EX  Re-assessment includes objective measure, subjective reports, functional outcomes, goal setting, and plan of care review.     MANUAL     Attended stim with thermaprobe x 10 min with 5 bar heating to L glut    Assessment:     Pt hsa made good functional progress with improvement in 6 min walk test and sit to stand function. See obj measures for improvements in these areas. Pt is not able to perform sit to stand wtihout UE support.  Pt continues to demonstrate abnormal gait pattern with lack of TKE and hip extension using SPC limiting her function and increasing fall risk. This along with significant strength deficits and pain and inability to perform SLB require continued skilled care. Prognosis is good based on pt motivation and current progress.   Plan:  OP PT Plan  PT Plan: Skilled PT  PT Frequency: 2 times per week  Duration: 6 weeks  Onset Date: 02/19/24  Certification Period Start Date: 05/16/24  Certification Period End Date: 08/14/24  Rehab Potential: Good  Plan of Care Agreement: Patient  Pt lacking knee, ankle and hip strength. Unable to perform SLB on  L LE.   Work on LE strength, hamstrings quad, dorsiflexors and Pfs. Improve glute med strength.    Goals:   STG  Pt will demonstrate an excellent understanding of MITRA precautions and time frames in 2 weeks  Goal achieved  The patient will report an increase in LEFS score to 45 in 4 weeks Demonstrating a clinically significant change (MCID 9).   Goal achieved  Pt will be able to perform STS without UE support in 4 weeks.  No progress  LTG  The patient will demonstrate a decrease in average TUG time to <10 seconds to demonstrate a decreased fall  prediction rate.   Minimal progress  The patient will perform a single leg stance >10 seconds to demonstrate no fall risk bilaterally.  The patient will report an increase in LEFS score to 55. Demonstrating a clinically significant change (MCID 9).   Pt will be able to walk continuously for 30 mins for cardiovascular benefits - patient stated goal.     Erick Nicole PT          Current Participants as of 5/16/2024    Name Type Comments Contact Info    Dontae John MD Referring Provider  [phone]    Signature pending    Erick Nicole PT Physical Therapist  [phone]    Signature pending

## 2024-05-16 NOTE — LETTER
May 16, 2024    Carolina Dhaliwal PA-C  [address] [place] [address]  [address]  [facility] [address]    Patient: Victorina Velez   YOB: 1950   Date of Visit: 5/16/2024       Dear Carolina Dhaliwal PA-C  [address]  [place],  [facility]    The attached plan of care is being sent to you because your patient’s medical reimbursement requires that you certify the plan of care. Your signature is required to allow uninterrupted insurance coverage.      You may indicate your approval by signing below and faxing this form back to us at Dept Fax: [phone].    Please call Dept: [phone] with any questions or concerns.    Thank you for this referral,        Erick Nicole PT  [address] [address]    Payer: Payor: MEDICARE / Plan: MEDICARE PART A AND B / Product Type: *No Product type* /                                                                         Date:     Dear Erick Nicole PT,     Re: Ms. Victorina Velez, MRN:[id number]    I certify that I have reviewed the attached plan of care and it is medically necessary for Ms. Victorina Velez (1950) who is under my care.          ______________________________________                    _________________  Provider name and credentials                                           Date and time                                                                                           Plan of Care 7/11/24   Effective from: 7/11/2024  Effective to: 8/14/2024    Plan ID: [id number]                Participants as of Finalize on 5/16/2024      Name Type Comments Contact Info    Dontae John MD Referring Provider  [phone]    Erick Nicole PT Physical Therapist  [phone]           Last Plan Note       Author: Erick Nicole PT Status: Incomplete Last edited: 5/16/2024 10:15 AM           Physical Therapy  Physical Therapy Treatment Note    Patient Name: Victorina  "Agustin  MRN: [id number]  Today's Date: 5/16/2024  Time Calculation  Start Time: 1015  Stop Time: 1109  Time Calculation (min): 54 min  PT Therapeutic Procedures Time Entry  Therapeutic Exercise Time Entry: 30  PT Modalities Time Entry  E-Stim (Attended) Time Entry: 10     Insurance:  Visit number: 10 of med nec  Authorization info: no auth   Insurance Type: Payor: MEDICARE / Plan: MEDICARE PART A AND B / Product Type: *No Product type* /   Current Problem  1. Pain of left hip joint  Follow Up In Physical Therapy        General    2/19/24 for L posterior THR by Dr. Ruiz.  Past Medical History Relevant to Rehab: breast CA with lumpectomy, dyspnea, metatarsal bone fracture, adenocarcinoma of lung with lung lobectomy, hypothyroidism, UTI, pneumonia, mastectomy, hip surgery, anxiety, OP, HA, elbow surgery  Family/Caregiver Present: No  Precautions:   MITRA 2/19/24 posterior hip precautions  Subjective:   Subjective  Patient reports feeling better and feeling more like \"herself\". Feels some tightness near her incision. Pain is minimal and 0/10 at rest but reports stiffness in anterior hip. Reports improvement in transfers but still challenged with ambulation and needs SPC.   Pain  4/10 back pain today. No pain in hip, just stiffness.  Objective:   Lower Extremity Functional Movements 5/16/24  Transfers: STS from low table height able to perform with rocking  Gait: B knee valgus. Decreased stride length L. Decreased TKE L. Decreased hip extension B.   SL Balance: unable to perform on L side with pain. R side 4.5 seconds  STS UE support on table 9 reps 30 seconds.  5/16/24 12 reps soreness on L sided low back  STS without UE unable to perform safely  5/16/24 10 reps with rocking - need to re-test with no rocking permitted    MMT 5/16/24  Knee extension R 5  L 4  Knee flexion      R 5  L 4  Dorsiflex            R 5  L 4+    TUG with SPC: average 11.75 seconds  5/16/24 11.8 seconds average with SPC  6 minute walk test: 3 " mins 11 seconds stopped due to SOB and not wanting to push this with medical history. While ambulating over 1 minute anterior groin pain occurs.   5/16/24 6 min walk 870 feet  Outcome Measures:  Other Measures  Lower Extremity Funtional Score (LEFS): 48   Treatments:   Access Code [email or web address] (updated 5/8/24)  THERE EX  Re-assessment includes objective measure, subjective reports, functional outcomes, goal setting, and plan of care review.     MANUAL     Attended stim with thermaprobe x 10 min with 5 bar heating to L glut    Assessment:     Pt hsa made good functional progress with improvement in 6 min walk test and sit to stand function. See obj measures for improvements in these areas. Pt is not able to perform sit to stand wtihout UE support.  Pt continues to demonstrate abnormal gait pattern with lack of TKE and hip extension using SPC limiting her function and increasing fall risk. This along with significant strength deficits and pain and inability to perform SLB require continued skilled care. Prognosis is good based on pt motivation and current progress.   Plan:  OP PT Plan  PT Plan: Skilled PT  PT Frequency: 2 times per week  Duration: 6 weeks  Onset Date: 02/19/24  Certification Period Start Date: 05/16/24  Certification Period End Date: 08/14/24  Rehab Potential: Good  Plan of Care Agreement: Patient  Pt lacking knee, ankle and hip strength. Unable to perform SLB on  L LE.   Work on LE strength, hamstrings quad, dorsiflexors and Pfs. Improve glute med strength.    Goals:   STG  Pt will demonstrate an excellent understanding of MITRA precautions and time frames in 2 weeks  Goal achieved  The patient will report an increase in LEFS score to 45 in 4 weeks Demonstrating a clinically significant change (MCID 9).   Goal achieved  Pt will be able to perform STS without UE support in 4 weeks.  No progress  LTG  The patient will demonstrate a decrease in average TUG time to <10 seconds to demonstrate a decreased fall  prediction rate.   Minimal progress  The patient will perform a single leg stance >10 seconds to demonstrate no fall risk bilaterally.  The patient will report an increase in LEFS score to 55. Demonstrating a clinically significant change (MCID 9).   Pt will be able to walk continuously for 30 mins for cardiovascular benefits - patient stated goal.     Erick Nicole PT          Current Participants as of 5/16/2024      Name Type Comments Contact Info    Dontae John MD Referring Provider  [phone]    Signature pending    Erick Nicole PT Physical Therapist  [phone]    Electronically signed by Erick Nicole PT at 5/16/2024 1131 EDT

## 2024-05-20 NOTE — PROGRESS NOTES
Physical Therapy  Physical Therapy Treatment Note    Patient Name: Victorina Velez  MRN: [id number]  Today's Date: 5/21/2024  Time Calculation  Start Time: 0915  Stop Time: 1010  Time Calculation (min): 55 min  PT Therapeutic Procedures Time Entry  Therapeutic Exercise Time Entry: 30  PT Modalities Time Entry  E-Stim (Attended) Time Entry: 10     Insurance:  Visit number: 11 of med nec  Authorization info: no auth   Insurance Type: Payor: MEDICARE / Plan: MEDICARE PART A AND B / Product Type: *No Product type* /   Current Problem  1. Pain of left hip joint  Follow Up In Physical Therapy        General    2/19/24 for L posterior THR by Dr. Ruiz.  Past Medical History Relevant to Rehab: breast CA with lumpectomy, dyspnea, metatarsal bone fracture, adenocarcinoma of lung with lung lobectomy, hypothyroidism, UTI, pneumonia, mastectomy, hip surgery, anxiety, OP, HA, elbow surgery  Family/Caregiver Present: No  Precautions:   MITRA 2/19/24 posterior hip precautions  Subjective:   Subjective     Pt reports having no pain today, only stiffness. She did take tylenol this morning since she stated she had some soreness this morning when she woke up but that helped relieve that. She also stated that she's been taking her steps into her apartment using a reciprocal pattern and that has been going well for her. She does have a wall on one side and railing on the other and that helps w her balance. She uses her cane inside her apartment and then has one in the car to take w her.         Objective:   Lower Extremity Functional Movements 5/16/24  Transfers: STS from low table height able to perform with rocking  Gait: B knee valgus. Decreased stride length L. Decreased TKE L. Decreased hip extension B.   SL Balance: unable to perform on L side with pain. R side 4.5 seconds  STS UE support on table 9 reps 30 seconds.  5/16/24 12 reps soreness on L sided low back  STS without UE unable to perform safely  5/16/24 10 reps with rocking  "- need to re-test with no rocking permitted    MMT 5/16/24  Knee extension R 5  L 4  Knee flexion      R 5  L 4  Dorsiflex            R 5  L 4+    TUG with SPC: average 11.75 seconds  5/16/24 11.8 seconds average with SPC  6 minute walk test: 3 mins 11 seconds stopped due to SOB and not wanting to push this with medical history. While ambulating over 1 minute anterior groin pain occurs.   5/16/24 6 min walk 870 feet      Outcome Measures:  Other Measures  Lower Extremity Funtional Score (LEFS): 48       Treatments:   Access Code [email or web address] (updated 5/21/24)    THERE EX (30\")    -step ups 6in 2 x 10 (right hand on //)  -mini squats 2 x 10 in //  -TKE against wall w ball 2 x 10 L  -HF stretch w lunge on 6 in step 30\" hold x 4   -Prone on elbow stretch for HF 10\" x 5       Modalities: 10'    Attended stim with thermaprobe x 10 min with 5 bar heating to L glut    Assessment:     Pt responded to treatment well and said she had less stiffness after. She had some soreness in her hip flexor but says it is manageable. She really enjoyed the TKE exercise and would like continue doing this next visit. The 2 stretches were added to her HEP and she has good understanding of them.     Plan:  Continue to work on stretching and incorporating LE strengthening exercises.       Goals:   STG  Pt will demonstrate an excellent understanding of MITRA precautions and time frames in 2 weeks  Goal achieved  The patient will report an increase in LEFS score to 45 in 4 weeks Demonstrating a clinically significant change (MCID 9).   Goal achieved  Pt will be able to perform STS without UE support in 4 weeks.  No progress  LTG  The patient will demonstrate a decrease in average TUG time to <10 seconds to demonstrate a decreased fall prediction rate.   Minimal progress  The patient will perform a single leg stance >10 seconds to demonstrate no fall risk bilaterally.  The patient will report an increase in LEFS score to 55. Demonstrating a clinically " significant change (MCID 9).   Pt will be able to walk continuously for 30 mins for cardiovascular benefits - patient stated goal.     Coreen Davis, PT

## 2024-05-21 ENCOUNTER — TREATMENT (OUTPATIENT)
Dept: PHYSICAL THERAPY | Facility: CLINIC | Age: 74
End: 2024-05-21
Payer: MEDICARE

## 2024-05-21 DIAGNOSIS — M25.552 PAIN OF LEFT HIP JOINT: Primary | ICD-10-CM

## 2024-05-21 PROCEDURE — 97110 THERAPEUTIC EXERCISES: CPT | Mod: GP,CQ

## 2024-05-21 PROCEDURE — 97032 APPL MODALITY 1+ESTIM EA 15: CPT | Mod: GP,CQ

## 2024-05-23 ENCOUNTER — TREATMENT (OUTPATIENT)
Dept: PHYSICAL THERAPY | Facility: CLINIC | Age: 74
End: 2024-05-23
Payer: MEDICARE

## 2024-05-23 DIAGNOSIS — M25.552 PAIN OF LEFT HIP JOINT: Primary | ICD-10-CM

## 2024-05-23 PROCEDURE — 97032 APPL MODALITY 1+ESTIM EA 15: CPT | Mod: GP,CQ

## 2024-05-23 PROCEDURE — 97110 THERAPEUTIC EXERCISES: CPT | Mod: GP,CQ

## 2024-05-23 NOTE — PROGRESS NOTES
"  Physical Therapy  Physical Therapy Treatment Note    Patient Name: Victorina Velez  MRN: [id number]  Today's Date: 5/23/2024  Time Calculation  Start Time: 1000  Stop Time: 1045  Time Calculation (min): 45 min  PT Therapeutic Procedures Time Entry  Therapeutic Exercise Time Entry: 35  PT Modalities Time Entry  E-Stim (Attended) Time Entry: 10     Insurance:  Visit number: 12 of med nec  Authorization info: no auth   Insurance Type: Payor: MEDICARE / Plan: MEDICARE PART A AND B / Product Type: *No Product type* /   Current Problem  1. Pain of left hip joint  Follow Up In Physical Therapy        General    2/19/24 for L posterior THR by Dr. Ruiz.  Past Medical History Relevant to Rehab: breast CA with lumpectomy, dyspnea, metatarsal bone fracture, adenocarcinoma of lung with lung lobectomy, hypothyroidism, UTI, pneumonia, mastectomy, hip surgery, anxiety, OP, HA, elbow surgery  Family/Caregiver Present: No  Precautions:   MITRA 2/19/24 posterior hip precautions  Subjective:   Subjective     Pt reports having no pain today, only some stiffness. She said she feels like the stretches she was given really helped \"loosen\" her up and made her feel better. She takes tylenol every morning to help w any pain.       Objective:   Lower Extremity Functional Movements 5/16/24  Transfers: STS from low table height able to perform with rocking  Gait: B knee valgus. Decreased stride length L. Decreased TKE L. Decreased hip extension B.   SL Balance: unable to perform on L side with pain. R side 4.5 seconds  STS UE support on table 9 reps 30 seconds.  5/16/24 12 reps soreness on L sided low back  STS without UE unable to perform safely  5/16/24 10 reps with rocking - need to re-test with no rocking permitted    MMT 5/16/24  Knee extension R 5  L 4  Knee flexion      R 5  L 4  Dorsiflex            R 5  L 4+    TUG with SPC: average 11.75 seconds  5/16/24 11.8 seconds average with SPC  6 minute walk test: 3 mins 11 seconds stopped due " "to SOB and not wanting to push this with medical history. While ambulating over 1 minute anterior groin pain occurs.   5/16/24 6 min walk 870 feet      Outcome Measures:  Other Measures  Lower Extremity Funtional Score (LEFS): 48       Treatments:   Access Code [email or web address] (updated 5/21/24)    THERE EX (35\")    -Upright bike 10'  -Treadmill 1.3 mph x 5'  -step ups 6in 2 x 10 (right hand on //)  -mini squats 2 x 10 in //  -Hip abd 2 x10 //  -TKE against wall w ball 2 x 10 L  -Prone on elbow stretch for HF 10\" x 5       Modalities: 10'    Attended stim with thermaprobe x 10 min with 5 bar heating to L glut    Assessment:     Pt responded to treatment well and said she felt good after treatment. She really enjoyed getting to be on the bike and using the treadmill. She would like to continue using the upright bike. She had some fatigue at the end but no pain.     Plan:  Continue to work on stretching and incorporating LE strengthening exercises. See how pt feels NV following upright bike and TM today.       Goals:   STG  Pt will demonstrate an excellent understanding of MITRA precautions and time frames in 2 weeks  Goal achieved  The patient will report an increase in LEFS score to 45 in 4 weeks Demonstrating a clinically significant change (MCID 9).   Goal achieved  Pt will be able to perform STS without UE support in 4 weeks.  No progress  LTG  The patient will demonstrate a decrease in average TUG time to <10 seconds to demonstrate a decreased fall prediction rate.   Minimal progress  The patient will perform a single leg stance >10 seconds to demonstrate no fall risk bilaterally.  The patient will report an increase in LEFS score to 55. Demonstrating a clinically significant change (MCID 9).   Pt will be able to walk continuously for 30 mins for cardiovascular benefits - patient stated goal.     Coreen Davis, PT   "

## 2024-05-28 ENCOUNTER — APPOINTMENT (OUTPATIENT)
Dept: PHYSICAL THERAPY | Facility: CLINIC | Age: 74
End: 2024-05-28
Payer: MEDICARE

## 2024-05-30 ENCOUNTER — TREATMENT (OUTPATIENT)
Dept: PHYSICAL THERAPY | Facility: CLINIC | Age: 74
End: 2024-05-30
Payer: MEDICARE

## 2024-05-30 DIAGNOSIS — M25.552 PAIN OF LEFT HIP JOINT: ICD-10-CM

## 2024-05-30 PROCEDURE — 97032 APPL MODALITY 1+ESTIM EA 15: CPT | Mod: GP | Performed by: PHYSICAL THERAPIST

## 2024-05-30 PROCEDURE — 97530 THERAPEUTIC ACTIVITIES: CPT | Mod: GP | Performed by: PHYSICAL THERAPIST

## 2024-05-30 PROCEDURE — 97110 THERAPEUTIC EXERCISES: CPT | Mod: GP | Performed by: PHYSICAL THERAPIST

## 2024-05-30 NOTE — PROGRESS NOTES
Physical Therapy  Physical Therapy Treatment Note    Patient Name: Victorina Velez  MRN: [id number]  Today's Date: 5/30/2024  Time Calculation  Start Time: 1100  Stop Time: 1145  Time Calculation (min): 45 min  PT Therapeutic Procedures Time Entry  Therapeutic Exercise Time Entry: 12  Therapeutic Activity Time Entry: 17  PT Modalities Time Entry  E-Stim (Attended) Time Entry: 10     Insurance:  Visit number: 13 of med nec  Authorization info: no auth   Insurance Type: Payor: MEDICARE / Plan: MEDICARE PART A AND B / Product Type: *No Product type* /   Current Problem  1. Pain of left hip joint  Follow Up In Physical Therapy        General    2/19/24 for L posterior THR by Dr. Ruiz.  Past Medical History Relevant to Rehab: breast CA with lumpectomy, dyspnea, metatarsal bone fracture, adenocarcinoma of lung with lung lobectomy, hypothyroidism, UTI, pneumonia, mastectomy, hip surgery, anxiety, OP, HA, elbow surgery  Family/Caregiver Present: No  Precautions:   MITRA 2/19/24 posterior hip precautions  Subjective:   Subjective   Pt reports she is definitely feeling better. Still following MITRA precautions. Difficulty bending forward. Taking OTC pain meds before therapy. She felt good on the TM and bike and wanted to go longer but she has been limiting herself.   Stiffness in lateral hip and inner thigh.     Objective:   Lower Extremity Functional Movements 5/16/24  Transfers: STS from low table height able to perform with rocking  Gait: B knee valgus. Decreased stride length L. Decreased TKE L. Decreased hip extension B.   SL Balance: unable to perform on L side with pain. R side 4.5 seconds  STS UE support on table 9 reps 30 seconds.  5/16/24 12 reps soreness on L sided low back  STS without UE unable to perform safely  5/16/24 10 reps with rocking - need to re-test with no rocking permitted    MMT 5/16/24  Knee extension R 5  L 4  Knee flexion      R 5  L 4  Dorsiflex            R 5  L 4+    TUG with SPC: average 11.75  seconds  5/16/24 11.8 seconds average with SPC  6 minute walk test: 3 mins 11 seconds stopped due to SOB and not wanting to push this with medical history. While ambulating over 1 minute anterior groin pain occurs.   5/16/24 6 min walk 870 feet    Outcome Measures:  Other Measures  Lower Extremity Funtional Score (LEFS): 48     Treatments:   Access Code [email or web address] (updated 5/21/24)    THERE EX  -Upright bike 10'  -TKE against wall w ball 2 x 10 L  THER ACT  -step ups 6in in // bars 2 sets 15  -mini squats 2 x 15 in //  - shuttle 62 lbs. 3 sets x 15 reps  Modalities:   Attended stim with thermaprobe x 10 min with 5 bar heating to L glut    Assessment:     Pt reported abolishment of stiffness in lateral hip and near anterior thigh with thermaprobe today. Functional strength improving with improved ability to perform sit to stand demonstrated but compensations noted and elevated chair required with no support. UE support required for step ups.   Progressed exercises with shuttle performed today.     Plan:  Continue to work on stretching and incorporating LE strengthening exercises. See how pt feels NV following upright bike and TM today.       Goals:   STG  Pt will demonstrate an excellent understanding of MITRA precautions and time frames in 2 weeks  Goal achieved  The patient will report an increase in LEFS score to 45 in 4 weeks Demonstrating a clinically significant change (MCID 9).   Goal achieved  Pt will be able to perform STS without UE support in 4 weeks.  No progress  LTG  The patient will demonstrate a decrease in average TUG time to <10 seconds to demonstrate a decreased fall prediction rate.   Minimal progress  The patient will perform a single leg stance >10 seconds to demonstrate no fall risk bilaterally.  The patient will report an increase in LEFS score to 55. Demonstrating a clinically significant change (MCID 9).   Pt will be able to walk continuously for 30 mins for cardiovascular benefits - patient  stated goal.     Erick Nicole, PT

## 2024-05-31 ENCOUNTER — APPOINTMENT (OUTPATIENT)
Dept: PHYSICAL THERAPY | Facility: CLINIC | Age: 74
End: 2024-05-31
Payer: MEDICARE

## 2024-06-04 ENCOUNTER — APPOINTMENT (OUTPATIENT)
Dept: PHYSICAL THERAPY | Facility: CLINIC | Age: 74
End: 2024-06-04
Payer: MEDICARE

## 2024-06-06 ENCOUNTER — TREATMENT (OUTPATIENT)
Dept: PHYSICAL THERAPY | Facility: CLINIC | Age: 74
End: 2024-06-06
Payer: MEDICARE

## 2024-06-06 DIAGNOSIS — M25.552 PAIN OF LEFT HIP JOINT: Primary | ICD-10-CM

## 2024-06-06 PROCEDURE — 97032 APPL MODALITY 1+ESTIM EA 15: CPT | Mod: GP,CQ

## 2024-06-06 PROCEDURE — 97140 MANUAL THERAPY 1/> REGIONS: CPT | Mod: GP,CQ

## 2024-06-06 PROCEDURE — 97110 THERAPEUTIC EXERCISES: CPT | Mod: GP,CQ

## 2024-06-06 NOTE — PROGRESS NOTES
"  Physical Therapy  Physical Therapy Treatment Note    Patient Name: Victorina Velez  MRN: [id number]  Today's Date: 6/6/2024  Time Calculation  Start Time: 0900  Stop Time: 0955  Time Calculation (min): 55 min  PT Therapeutic Procedures Time Entry  Manual Therapy Time Entry: 15  Therapeutic Exercise Time Entry: 25  PT Modalities Time Entry  E-Stim (Attended) Time Entry: 10     Insurance:  Visit number: 14 of med nec  Authorization info: no auth   Insurance Type: Payor: MEDICARE / Plan: MEDICARE PART A AND B / Product Type: *No Product type* /   Current Problem  1. Pain of left hip joint  Follow Up In Physical Therapy        General    2/19/24 for L posterior THR by Dr. Ruiz.  Past Medical History Relevant to Rehab: breast CA with lumpectomy, dyspnea, metatarsal bone fracture, adenocarcinoma of lung with lung lobectomy, hypothyroidism, UTI, pneumonia, mastectomy, hip surgery, anxiety, OP, HA, elbow surgery  Family/Caregiver Present: No  Precautions:   MITRA 2/19/24 posterior hip precautions  Subjective:   Subjective     Pt reports she is feeling a little stiff today in her quads and adductors. She says usually she feels better when she is able to get it \"loosened up\" and move around. She says she has been walking on the treadmill 3x a week and feels that has been helping keep her active.    Objective:   Lower Extremity Functional Movements 5/16/24  Transfers: STS from low table height able to perform with rocking  Gait: B knee valgus. Decreased stride length L. Decreased TKE L. Decreased hip extension B.   SL Balance: unable to perform on L side with pain. R side 4.5 seconds  STS UE support on table 9 reps 30 seconds.  5/16/24 12 reps soreness on L sided low back  STS without UE unable to perform safely  5/16/24 10 reps with rocking - need to re-test with no rocking permitted    MMT 5/16/24  Knee extension R 5  L 4  Knee flexion      R 5  L 4  Dorsiflex            R 5  L 4+    TUG with SPC: average 11.75 " "seconds  5/16/24 11.8 seconds average with SPC  6 minute walk test: 3 mins 11 seconds stopped due to SOB and not wanting to push this with medical history. While ambulating over 1 minute anterior groin pain occurs.   5/16/24 6 min walk 870 feet    Outcome Measures:  Other Measures  Lower Extremity Funtional Score (LEFS): 48     Treatments:   Access Code [email or web address] (updated 5/21/24)    THERE EX (25')  -Upright bike 10'  -standing add stretch w foot on steps 3 x 30\"  -step ups 6in on stairs 2 x 10  -mini squats 2 x 10  -standing abd at steps 2 x10  - shuttle 62 lbs. 3 sets x 15 reps    Manual (15')  STM to adductors      Attended stim with thermaprobe x 10 min with 5 bar heating to L adductors    Assessment:    Tx was focused on tightness she was having in her L adductors. The thermaprobe was done on her adductors today instead of her L glut. After the STM and thermaprobe, she said she felt a lot less tight.    Plan:  Continue to work on stretching and incorporating LE strengthening exercises. See how pt feels NV following thermaprobe and stretching for adductors.    Goals:   STG  Pt will demonstrate an excellent understanding of MITRA precautions and time frames in 2 weeks  Goal achieved  The patient will report an increase in LEFS score to 45 in 4 weeks Demonstrating a clinically significant change (MCID 9).   Goal achieved  Pt will be able to perform STS without UE support in 4 weeks.  No progress  LTG  The patient will demonstrate a decrease in average TUG time to <10 seconds to demonstrate a decreased fall prediction rate.   Minimal progress  The patient will perform a single leg stance >10 seconds to demonstrate no fall risk bilaterally.  The patient will report an increase in LEFS score to 55. Demonstrating a clinically significant change (MCID 9).   Pt will be able to walk continuously for 30 mins for cardiovascular benefits - patient stated goal.     Coreen Davis, PT   "

## 2024-06-10 ENCOUNTER — TREATMENT (OUTPATIENT)
Dept: PHYSICAL THERAPY | Facility: CLINIC | Age: 74
End: 2024-06-10
Payer: MEDICARE

## 2024-06-10 DIAGNOSIS — M25.552 PAIN OF LEFT HIP JOINT: Primary | ICD-10-CM

## 2024-06-10 PROCEDURE — 97032 APPL MODALITY 1+ESTIM EA 15: CPT | Mod: GP,CQ

## 2024-06-10 PROCEDURE — 97110 THERAPEUTIC EXERCISES: CPT | Mod: GP,CQ

## 2024-06-10 NOTE — PROGRESS NOTES
Physical Therapy  Physical Therapy Treatment Note    Patient Name: Victorina Velez  MRN: [id number]  Today's Date: 6/10/2024  Time Calculation  Start Time: 0915  Stop Time: 1000  Time Calculation (min): 45 min  PT Therapeutic Procedures Time Entry  Therapeutic Exercise Time Entry: 35  PT Modalities Time Entry  E-Stim (Attended) Time Entry: 10     Insurance:  Visit number: 15 of med nec  Authorization info: no auth   Insurance Type: Payor: MEDICARE / Plan: MEDICARE PART A AND B / Product Type: *No Product type* /   Current Problem  1. Pain of left hip joint  Follow Up In Physical Therapy        General    2/19/24 for L posterior THR by Dr. Ruiz.  Past Medical History Relevant to Rehab: breast CA with lumpectomy, dyspnea, metatarsal bone fracture, adenocarcinoma of lung with lung lobectomy, hypothyroidism, UTI, pneumonia, mastectomy, hip surgery, anxiety, OP, HA, elbow surgery  Family/Caregiver Present: No  Precautions:   MITRA 2/19/24 posterior hip precautions  Subjective:   Subjective     Pt reports feeling sore today in her gluts. She says she had a rough day yesterday pain wise and was really limited in what she could do because of the pain. She says that she's on a cholesterol medication and thinks that could be what is causing her muscle soreness she is having. She plans to speak to her doctor about reducing her dosage and see if that helps.    Objective:   Lower Extremity Functional Movements 5/16/24  Transfers: STS from low table height able to perform with rocking  Gait: B knee valgus. Decreased stride length L. Decreased TKE L. Decreased hip extension B.   SL Balance: unable to perform on L side with pain. R side 4.5 seconds  STS UE support on table 9 reps 30 seconds.  5/16/24 12 reps soreness on L sided low back  STS without UE unable to perform safely  5/16/24 10 reps with rocking - need to re-test with no rocking permitted    MMT 5/16/24  Knee extension R 5  L 4  Knee flexion      R 5  L 4  Dorsiflex     "        R 5  L 4+    TUG with SPC: average 11.75 seconds  5/16/24 11.8 seconds average with SPC  6 minute walk test: 3 mins 11 seconds stopped due to SOB and not wanting to push this with medical history. While ambulating over 1 minute anterior groin pain occurs.   5/16/24 6 min walk 870 feet    Outcome Measures:  Other Measures  Lower Extremity Funtional Score (LEFS): 48     Treatments:   Access Code [email or web address] (updated 5/21/24)    THERE EX (30')  -Upright bike 10'  - shuttle 62# x 15, #68 2 x15  -step ups 6in on stairs 2 x 15  -lateral step ups on stairs 6\" x 15  -mini squats 2 x 10  -standing abd at steps 2 x10  - demonstrated self massage for patient to do at home    Attended stim with thermaprobe x 10 min with 5 bar heating to L glut    Assessment:    Pt tolerated tx well today. She felt fatigued following the exercises today but feels that may be because she has taken it easy the last few days and not done much. She said she felt much better following the thermaprobe and felt much looser.     Plan:  Continue to work on stretching and incorporating LE strengthening exercises.     Goals:   STG  Pt will demonstrate an excellent understanding of MITRA precautions and time frames in 2 weeks  Goal achieved  The patient will report an increase in LEFS score to 45 in 4 weeks Demonstrating a clinically significant change (MCID 9).   Goal achieved  Pt will be able to perform STS without UE support in 4 weeks.  No progress  LTG  The patient will demonstrate a decrease in average TUG time to <10 seconds to demonstrate a decreased fall prediction rate.   Minimal progress  The patient will perform a single leg stance >10 seconds to demonstrate no fall risk bilaterally.  The patient will report an increase in LEFS score to 55. Demonstrating a clinically significant change (MCID 9).   Pt will be able to walk continuously for 30 mins for cardiovascular benefits - patient stated goal.     Coreen Davis, PT   "

## 2024-06-12 NOTE — PROGRESS NOTES
Physical Therapy  Physical Therapy Treatment Note    Patient Name: Victorina Velez  MRN: [id number]  Today's Date: 6/13/2024  Time Calculation  Start Time: 0915  Stop Time: 1000  Time Calculation (min): 45 min  PT Therapeutic Procedures Time Entry  Manual Therapy Time Entry: 25  PT Modalities Time Entry  E-Stim (Attended) Time Entry: 10     Insurance:  Visit number: 16 of med nec  Authorization info: no auth   Insurance Type: Payor: MEDICARE / Plan: MEDICARE PART A AND B / Product Type: *No Product type* /   Current Problem  1. Pain of left hip joint  Follow Up In Physical Therapy        General    2/19/24 for L posterior THR by Dr. Ruiz.  Past Medical History Relevant to Rehab: breast CA with lumpectomy, dyspnea, metatarsal bone fracture, adenocarcinoma of lung with lung lobectomy, hypothyroidism, UTI, pneumonia, mastectomy, hip surgery, anxiety, OP, HA, elbow surgery  Family/Caregiver Present: No  Precautions:   MITRA 2/19/24 posterior hip precautions  Subjective:   Subjective     Pt reports feeling very sore and stiff today in her gluts and near her incision.  She said she did a lot of walking yesterday and thinks she may be doing too much. She said she mainly feels the pain when she is up walking and weight bearing through that leg.    Objective:   Lower Extremity Functional Movements 5/16/24  Transfers: STS from low table height able to perform with rocking  Gait: B knee valgus. Decreased stride length L. Decreased TKE L. Decreased hip extension B.   SL Balance: unable to perform on L side with pain. R side 4.5 seconds  STS UE support on table 9 reps 30 seconds.  5/16/24 12 reps soreness on L sided low back  STS without UE unable to perform safely  5/16/24 10 reps with rocking - need to re-test with no rocking permitted    MMT 5/16/24  Knee extension R 5  L 4  Knee flexion      R 5  L 4  Dorsiflex            R 5  L 4+    TUG with SPC: average 11.75 seconds  5/16/24 11.8 seconds average with SPC  6 minute walk  test: 3 mins 11 seconds stopped due to SOB and not wanting to push this with medical history. While ambulating over 1 minute anterior groin pain occurs.   5/16/24 6 min walk 870 feet    Outcome Measures:  Other Measures  Lower Extremity Funtional Score (LEFS): 48     Treatments:   Access Code [email or web address] (updated 5/21/24)    THERE EX (10')  -Upright bike 10'    Manual (25')  STM to gluts, IT band in sidelying    Attended stim with thermaprobe x 10 min with 5 bar heating to L glut    Assessment:    Tx today was very manual focused due to her pain and stiffness. She felt a lot better after the massage and thermaprobe. She was advised not to do her HEP for the next few days and take it easy to see if pain and stiffness subside. She was told she can begin to use her recumbent bike she has at home over the weekend, as long as she is following her hip precautions.     Plan:  Continue to work on stretching and incorporating LE strengthening exercises.     Goals:   STG  Pt will demonstrate an excellent understanding of MITRA precautions and time frames in 2 weeks  Goal achieved  The patient will report an increase in LEFS score to 45 in 4 weeks Demonstrating a clinically significant change (MCID 9).   Goal achieved  Pt will be able to perform STS without UE support in 4 weeks.  No progress  LTG  The patient will demonstrate a decrease in average TUG time to <10 seconds to demonstrate a decreased fall prediction rate.   Minimal progress  The patient will perform a single leg stance >10 seconds to demonstrate no fall risk bilaterally.  The patient will report an increase in LEFS score to 55. Demonstrating a clinically significant change (MCID 9).   Pt will be able to walk continuously for 30 mins for cardiovascular benefits - patient stated goal.     Coreen Davis, PT

## 2024-06-13 ENCOUNTER — TREATMENT (OUTPATIENT)
Dept: PHYSICAL THERAPY | Facility: CLINIC | Age: 74
End: 2024-06-13
Payer: MEDICARE

## 2024-06-13 DIAGNOSIS — M25.552 PAIN OF LEFT HIP JOINT: Primary | ICD-10-CM

## 2024-06-13 PROCEDURE — 97032 APPL MODALITY 1+ESTIM EA 15: CPT | Mod: GP,CQ

## 2024-06-13 PROCEDURE — 97140 MANUAL THERAPY 1/> REGIONS: CPT | Mod: GP,CQ

## 2024-06-17 ENCOUNTER — APPOINTMENT (OUTPATIENT)
Dept: OTOLARYNGOLOGY | Facility: CLINIC | Age: 74
End: 2024-06-17
Payer: MEDICARE

## 2024-06-17 VITALS — TEMPERATURE: 97.4 F | WEIGHT: 143 LBS | HEIGHT: 65 IN | BODY MASS INDEX: 23.82 KG/M2

## 2024-06-17 DIAGNOSIS — H61.23 BILATERAL IMPACTED CERUMEN: Primary | ICD-10-CM

## 2024-06-17 DIAGNOSIS — H93.8X3 EAR POPPING, BILATERAL: ICD-10-CM

## 2024-06-17 PROCEDURE — 1036F TOBACCO NON-USER: CPT | Performed by: NURSE PRACTITIONER

## 2024-06-17 PROCEDURE — 1159F MED LIST DOCD IN RCRD: CPT | Performed by: NURSE PRACTITIONER

## 2024-06-17 PROCEDURE — 69210 REMOVE IMPACTED EAR WAX UNI: CPT | Performed by: NURSE PRACTITIONER

## 2024-06-17 PROCEDURE — 99213 OFFICE O/P EST LOW 20 MIN: CPT | Performed by: NURSE PRACTITIONER

## 2024-06-17 NOTE — PROGRESS NOTES
Subjective   Patient ID: Victorina Velez is a 73 y.o. female who presents for Cerumen Impaction (bilateral).  HPI  This patient presents for scheduled cerumen removal.  Today, she denies any otalgia, otorrhea but endorses frequent ear popping.  Her PCP recently noted cerumen impactions.  She has been using earplugs in order to sleep due to noise from her neighbors bibi.  She does not wear hearing amplification.  Review of Systems  A comprehensive or 10 points review of the patient´s constitutional, neurological, HEENT, pulmonary, cardiovascular and genito-urinary systems showed only those mentioned in history of present illness.    Objective   Physical Exam  Constitutional: no fever, chills, weight loss or weight gain   General appearance: Appears well, well-nourished, well groomed. No acute distress.   Communication: Normal communication   Psychiatric: Oriented to person, place and time. Normal mood and affect.   Neurologic: Cranial nerves II-XII grossly intact and symmetric bilaterally.   Head and Face:   Head: Atraumatic with no masses, lesions or scarring.   Face: Normal symmetry, no paralysis, synkinesis or facial tic. No scars or deformities.     Eyes: Conjunctiva not edematous or erythematous   Ears: External inspection of ears with no deformity, scars or masses.  Bilateral canals with cerumen impactions     Neck: Normal appearing, symmetric, trachea midline.   Cardiovascular: Examination of peripheral vascular system shows no clubbing or cyanosis.   Respiratory: No respiratory distress increased work of breathing. Inspection of the chest with symmetric chest expansion and normal respiratory effort.   Skin: No rashes in the head or neck  Assessment/Plan        This patient presents for subsequent evaluation of acute acquired bilateral cerumen impactions and bilateral ear popping.    Reassurance given that otologic exam is normal after cleaning.  She may follow-up as needed.  All questions were answered to  patient's satisfaction.    This note was created using speech recognition transcription software. Despite proofreading, several typographical errors might be present that might affect the meaning of the content. Please call with any questions.  Patient ID: Victorina Velez is a 73 y.o. female.    Ear cerumen removal    Date/Time: 6/17/2024 9:29 AM    Performed by: TABATHA Arellano  Authorized by: TABATHA Arellano    Consent:     Consent obtained:  Verbal    Consent given by:  Patient    Risks discussed:  Pain    Alternatives discussed:  No treatment  Procedure details:     Location:  L ear and R ear    Procedure type: curette      Procedure outcomes: cerumen removed    Post-procedure details:     Inspection:  No bleeding, ear canal clear and TM intact    Hearing quality:  Improved    Procedure completion:  Tolerated well, no immediate complications      TABATHA Arellano 06/17/24 9:27 AM

## 2024-06-18 ENCOUNTER — TREATMENT (OUTPATIENT)
Dept: PHYSICAL THERAPY | Facility: CLINIC | Age: 74
End: 2024-06-18
Payer: MEDICARE

## 2024-06-18 DIAGNOSIS — M25.552 PAIN OF LEFT HIP JOINT: Primary | ICD-10-CM

## 2024-06-18 NOTE — PROGRESS NOTES
Physical Therapy  Physical Therapy Treatment Note    Patient Name: Victorina Velez  MRN: [id number]  Today's Date: 6/18/2024  Time Calculation  Start Time: 1000  Stop Time: 1045  Time Calculation (min): 45 min  PT Therapeutic Procedures Time Entry  Manual Therapy Time Entry: 15  Therapeutic Exercise Time Entry: 25        Insurance:  Visit number: 17 of med nec  Authorization info: no auth   Insurance Type: Payor: MEDICARE / Plan: MEDICARE PART A AND B / Product Type: *No Product type* /   Current Problem  1. Pain of left hip joint  Follow Up In Physical Therapy        General    2/19/24 for L posterior THR by Dr. Ruiz.  Past Medical History Relevant to Rehab: breast CA with lumpectomy, dyspnea, metatarsal bone fracture, adenocarcinoma of lung with lung lobectomy, hypothyroidism, UTI, pneumonia, mastectomy, hip surgery, anxiety, OP, HA, elbow surgery  Family/Caregiver Present: No  Precautions:   MITRA 2/19/24 posterior hip precautions  Subjective:   Subjective   Pt reports she is doing better today and the soreness that she was having last week has gotten much better. She is not sure if it is because of getting of the cholesterol medication or if it is because she took it easy over the weekend.       Objective:   Lower Extremity Functional Movements 5/16/24  Transfers: STS from low table height able to perform with rocking  Gait: B knee valgus. Decreased stride length L. Decreased TKE L. Decreased hip extension B.   SL Balance: unable to perform on L side with pain. R side 4.5 seconds  STS UE support on table 9 reps 30 seconds.  5/16/24 12 reps soreness on L sided low back  STS without UE unable to perform safely  5/16/24 10 reps with rocking - need to re-test with no rocking permitted    MMT 5/16/24  Knee extension R 5  L 4  Knee flexion      R 5  L 4  Dorsiflex            R 5  L 4+    TUG with SPC: average 11.75 seconds  5/16/24 11.8 seconds average with SPC  6 minute walk test: 3 mins 11 seconds stopped due to  "SOB and not wanting to push this with medical history. While ambulating over 1 minute anterior groin pain occurs.   5/16/24 6 min walk 870 feet    Outcome Measures:  Other Measures  Lower Extremity Funtional Score (LEFS): 48     Treatments:   Access Code [email or web address] (updated 5/21/24)    THERE EX (25')    -Upright bike 10'  -shuttle 50#  3 x10  -step ups 6in on stairs 2 x 15  -lateral step ups on stairs 6\" x 12  - standing hamstring stretch on step 3 x 30\"  -supine bridges 3 x10  -sidelying clamshells 3 x10  - Prone on elbow stretch for HF 10\" x 5  -cat/ camel x 10            Manual (15')  STM to gluts, IT band in sidelying  Theragun to gluts in sidelying        Assessment:    Pt had some of the soreness arise while doing some of her exercises today. Resistance on shuttle was reduced due to her having some pain. She felt the soreness subsided following the STM and theragun. She really enjoyed the theragun and would like to continue using that.     Plan:  Continue to work on stretching and incorporating LE strengthening exercises. Assess how patient's soreness is feeling NV after doing more exercises today.     Goals:   STG  Pt will demonstrate an excellent understanding of MITRA precautions and time frames in 2 weeks  Goal achieved  The patient will report an increase in LEFS score to 45 in 4 weeks Demonstrating a clinically significant change (MCID 9).   Goal achieved  Pt will be able to perform STS without UE support in 4 weeks.  No progress  LTG  The patient will demonstrate a decrease in average TUG time to <10 seconds to demonstrate a decreased fall prediction rate.   Minimal progress  The patient will perform a single leg stance >10 seconds to demonstrate no fall risk bilaterally.  The patient will report an increase in LEFS score to 55. Demonstrating a clinically significant change (MCID 9).   Pt will be able to walk continuously for 30 mins for cardiovascular benefits - patient stated goal.     Coreen Davis, PT "

## 2024-06-20 ENCOUNTER — TREATMENT (OUTPATIENT)
Dept: PHYSICAL THERAPY | Facility: CLINIC | Age: 74
End: 2024-06-20
Payer: MEDICARE

## 2024-06-20 DIAGNOSIS — M25.552 PAIN OF LEFT HIP JOINT: ICD-10-CM

## 2024-06-20 PROCEDURE — 97110 THERAPEUTIC EXERCISES: CPT | Mod: GP | Performed by: PHYSICAL THERAPIST

## 2024-06-20 PROCEDURE — 97530 THERAPEUTIC ACTIVITIES: CPT | Mod: GP | Performed by: PHYSICAL THERAPIST

## 2024-06-20 NOTE — PROGRESS NOTES
Physical Therapy  Physical Therapy Treatment Note    Patient Name: Victorina Velez  MRN: [id number]  Today's Date: 6/20/2024  Time Calculation  Start Time: 0925  Stop Time: 1015  Time Calculation (min): 50 min  PT Therapeutic Procedures Time Entry  Therapeutic Exercise Time Entry: 22  Therapeutic Activity Time Entry: 17        Insurance:  Visit number: 18 of med nec   Authorization info: no auth   Insurance Type: Payor: MEDICARE / Plan: MEDICARE PART A AND B / Product Type: *No Product type* /   Current Problem  1. Pain of left hip joint  Follow Up In Physical Therapy          General    2/19/24 for L posterior THR by Dr. Ruiz.  Past Medical History Relevant to Rehab: breast CA with lumpectomy, dyspnea, metatarsal bone fracture, adenocarcinoma of lung with lung lobectomy, hypothyroidism, UTI, pneumonia, mastectomy, hip surgery, anxiety, OP, HA, elbow surgery  Family/Caregiver Present: No  Precautions:   MITRA 2/19/24 posterior hip precautions  Subjective:   Subjective   Pt reports she is doing better today and the soreness that she was having last week has gotten much better. She is not sure if it is because of getting of the cholesterol medication or if it is because she took it easy over the weekend.       Objective:   Lower Extremity Functional Movements 5/16/24  Transfers: STS from low table height able to perform with rocking  Gait: B knee valgus. Decreased stride length L. Decreased TKE L. Decreased hip extension B.   SL Balance: unable to perform on L side with pain. R side 4.5 seconds  STS UE support on table 9 reps 30 seconds.  5/16/24 12 reps soreness on L sided low back  STS without UE unable to perform safely  5/16/24 10 reps with rocking - need to re-test with no rocking permitted    MMT 5/16/24  Knee extension R 5  L 4  Knee flexion      R 5  L 4  Dorsiflex            R 5  L 4+    TUG with SPC: average 11.75 seconds  5/16/24 11.8 seconds average with SPC  6 minute walk test: 3 mins 11 seconds stopped  "due to SOB and not wanting to push this with medical history. While ambulating over 1 minute anterior groin pain occurs.   5/16/24 6 min walk 870 feet    Outcome Measures:  Other Measures  Lower Extremity Funtional Score (LEFS): 48     Treatments:   Access Code [email or web address] (updated 5/21/24)    THERE EX (25')    -Upright bike 10'  - standing hamstring stretch on step 3 x 30\"  - standing hip flexor stretch biasing add 3 x 30\"  - knee extension machine 24 lbs. B 10 x 3  - knee flexion machine 24 lbs. 10 x 3    THER ACT - utilized to improve tolerance to stair climbing, squatting, and prolonged standing  - step ups 6in on stairs 2 x 10 pain in lateral hip after 19th rep - held 3rd set  - wall sit 30\" x 5  - shuttle 50#  3 x10 full range without wedge    Manual       Assessment:    Pt had soreness and pain with step up activity. PT cuing focused on weight over foot and pressing through L LE with good NM control and lowing eccentrically to work on stair climbing - after 19 reps pt experienced some pain which subsided within 2 mins of rest. Discussed that pt may be able to climb stairs currently due to compensations but functional strength is still lacking. There ACT utilized to improve functional mobility at home.     Plan:  Continue to work on stretching and incorporating LE strengthening exercises. Assess how patient's soreness is feeling NV after doing more exercises today.     Goals:   STG  Pt will demonstrate an excellent understanding of MITRA precautions and time frames in 2 weeks  Goal achieved  The patient will report an increase in LEFS score to 45 in 4 weeks Demonstrating a clinically significant change (MCID 9).   Goal achieved  Pt will be able to perform STS without UE support in 4 weeks.  No progress  LTG  The patient will demonstrate a decrease in average TUG time to <10 seconds to demonstrate a decreased fall prediction rate.   Minimal progress  The patient will perform a single leg stance >10 seconds to " demonstrate no fall risk bilaterally.  The patient will report an increase in LEFS score to 55. Demonstrating a clinically significant change (MCID 9).   Pt will be able to walk continuously for 30 mins for cardiovascular benefits - patient stated goal.     Erick Nicole, PT

## 2024-06-23 ENCOUNTER — HOSPITAL ENCOUNTER (OUTPATIENT)
Dept: RADIOLOGY | Facility: EXTERNAL LOCATION | Age: 74
Discharge: HOME | End: 2024-06-23
Payer: MEDICARE

## 2024-06-23 DIAGNOSIS — R05.1 ACUTE COUGH: ICD-10-CM

## 2024-06-23 DIAGNOSIS — Z85.118 H/O: LUNG CANCER: ICD-10-CM

## 2024-06-24 ENCOUNTER — APPOINTMENT (OUTPATIENT)
Dept: OBSTETRICS AND GYNECOLOGY | Facility: CLINIC | Age: 74
End: 2024-06-24
Payer: MEDICARE

## 2024-06-25 DIAGNOSIS — N95.2 VAGINAL ATROPHY: ICD-10-CM

## 2024-06-25 RX ORDER — ESTRADIOL 0.1 MG/G
0.5 CREAM VAGINAL 3 TIMES WEEKLY
Qty: 42.5 G | Refills: 2 | Status: SHIPPED | OUTPATIENT
Start: 2024-06-26

## 2024-07-02 ENCOUNTER — APPOINTMENT (OUTPATIENT)
Dept: PHYSICAL THERAPY | Facility: CLINIC | Age: 74
End: 2024-07-02
Payer: MEDICARE

## 2024-07-10 NOTE — PROGRESS NOTES
Physical Therapy  Physical Therapy Treatment Note    Patient Name: Victorina Velez  MRN: [id number]  Today's Date: 7/11/2024  Time Calculation  Start Time: 0915  Stop Time: 1000  Time Calculation (min): 45 min  PT Therapeutic Procedures Time Entry  Therapeutic Exercise Time Entry: 25  Therapeutic Activity Time Entry: 15        Insurance:  Visit number: 19 of med nec   Authorization info: no auth   Insurance Type: Payor: MEDICARE / Plan: MEDICARE PART A AND B / Product Type: *No Product type* /   Current Problem  1. Pain of left hip joint  Follow Up In Physical Therapy          General    2/19/24 for L posterior THR by Dr. Ruiz.  Past Medical History Relevant to Rehab: breast CA with lumpectomy, dyspnea, metatarsal bone fracture, adenocarcinoma of lung with lung lobectomy, hypothyroidism, UTI, pneumonia, mastectomy, hip surgery, anxiety, OP, HA, elbow surgery  Family/Caregiver Present: No  Precautions:   MITRA 2/19/24 posterior hip precautions  Subjective:   Subjective   Patient was sick and then lost mom so she hasn't done much the last two week but she is feeling pretty good.  Just having adductor soreness      Objective:   Lower Extremity Functional Movements 5/16/24  Transfers: STS from low table height able to perform with rocking  Gait: B knee valgus. Decreased stride length L. Decreased TKE L. Decreased hip extension B.   SL Balance: unable to perform on L side with pain. R side 4.5 seconds  STS UE support on table 9 reps 30 seconds.  5/16/24 12 reps soreness on L sided low back  STS without UE unable to perform safely  5/16/24 10 reps with rocking - need to re-test with no rocking permitted    MMT 5/16/24  Knee extension R 5  L 4  Knee flexion      R 5  L 4  Dorsiflex            R 5  L 4+    TUG with SPC: average 11.75 seconds  5/16/24 11.8 seconds average with SPC  6 minute walk test: 3 mins 11 seconds stopped due to SOB and not wanting to push this with medical history. While ambulating over 1 minute  "anterior groin pain occurs.   5/16/24 6 min walk 870 feet    Outcome Measures:  Other Measures  Lower Extremity Funtional Score (LEFS): 48     Treatments:   Access Code [email or web address] (updated 5/21/24)    THERE EX (25')    -Upright bike 10'  - standing hamstring stretch on step 3 x 30\"  - standing hip flexor stretch biasing add 3 x 30\"  - knee extension machine 24 lbs. B 10 x 3  - knee flexion machine 24 lbs. 10 x 3    THER ACT - utilized to improve tolerance to stair climbing, squatting, and prolonged standing  - step ups 6in on stairs 2 x 10 slowly  - wall sit 30\" x 5  - shuttle 50#  3 x10 full range without wedge  - standing hip adduction YTB 2 x 10     Manual       Assessment:    Patient tolerated session well.  Felt good adductor work with new add exercises.      Plan:  Continue to work on stretching and incorporating LE strengthening exercises. Assess how patient's soreness is feeling NV after doing more exercises today.     Goals:   STG  Pt will demonstrate an excellent understanding of MITRA precautions and time frames in 2 weeks  Goal achieved  The patient will report an increase in LEFS score to 45 in 4 weeks Demonstrating a clinically significant change (MCID 9).   Goal achieved  Pt will be able to perform STS without UE support in 4 weeks.  No progress  LTG  The patient will demonstrate a decrease in average TUG time to <10 seconds to demonstrate a decreased fall prediction rate.   Minimal progress  The patient will perform a single leg stance >10 seconds to demonstrate no fall risk bilaterally.  The patient will report an increase in LEFS score to 55. Demonstrating a clinically significant change (MCID 9).   Pt will be able to walk continuously for 30 mins for cardiovascular benefits - patient stated goal.     Coreen Davis, PT   "

## 2024-07-11 ENCOUNTER — TREATMENT (OUTPATIENT)
Dept: PHYSICAL THERAPY | Facility: CLINIC | Age: 74
End: 2024-07-11
Payer: MEDICARE

## 2024-07-11 DIAGNOSIS — M25.552 PAIN OF LEFT HIP JOINT: Primary | ICD-10-CM

## 2024-07-11 PROCEDURE — 97110 THERAPEUTIC EXERCISES: CPT | Mod: GP,CQ

## 2024-07-11 PROCEDURE — 97530 THERAPEUTIC ACTIVITIES: CPT | Mod: GP,CQ

## 2024-07-15 NOTE — PROGRESS NOTES
Physical Therapy  Physical Therapy Treatment Note    Patient Name: Victorina Velez  MRN: [id number]  Today's Date: 7/16/2024  Time Calculation  Start Time: 0830  Stop Time: 0915  Time Calculation (min): 45 min  PT Therapeutic Procedures Time Entry  Manual Therapy Time Entry: 25  Therapeutic Exercise Time Entry: 20        Insurance:  Visit number: 20 of med nec   Authorization info: no auth   Insurance Type: Payor: MEDICARE / Plan: MEDICARE PART A AND B / Product Type: *No Product type* /   Current Problem  1. Pain of left hip joint  Follow Up In Physical Therapy          General    2/19/24 for L posterior THR by Dr. Ruiz.  Past Medical History Relevant to Rehab: breast CA with lumpectomy, dyspnea, metatarsal bone fracture, adenocarcinoma of lung with lung lobectomy, hypothyroidism, UTI, pneumonia, mastectomy, hip surgery, anxiety, OP, HA, elbow surgery  Family/Caregiver Present: No  Precautions:   MITRA 2/19/24 posterior hip precautions  Subjective:   Subjective   Patient reports that she had a setback Sun but not sure what from.  I had to use the cane all day Sun and Im still limping.  I think its muscle though.       Objective:   Lower Extremity Functional Movements 5/16/24  Transfers: STS from low table height able to perform with rocking  Gait: B knee valgus. Decreased stride length L. Decreased TKE L. Decreased hip extension B.   SL Balance: unable to perform on L side with pain. R side 4.5 seconds  STS UE support on table 9 reps 30 seconds.  5/16/24 12 reps soreness on L sided low back  STS without UE unable to perform safely  5/16/24 10 reps with rocking - need to re-test with no rocking permitted    MMT 5/16/24  Knee extension R 5  L 4  Knee flexion      R 5  L 4  Dorsiflex            R 5  L 4+    TUG with SPC: average 11.75 seconds  5/16/24 11.8 seconds average with SPC  6 minute walk test: 3 mins 11 seconds stopped due to SOB and not wanting to push this with medical history. While ambulating over 1  "minute anterior groin pain occurs.   5/16/24 6 min walk 870 feet    Outcome Measures:  Other Measures  Lower Extremity Funtional Score (LEFS): 48     Treatments:   Access Code [email or web address] (updated 5/21/24)    THERE EX (20')    -Upright bike 15'  Manual    - standing hamstring stretch on step 3 x 30\"  - standing hip flexor stretch biasing add 3 x 30\"    Manual   DTM to quad, ITB, glut  Passive flexion    Assessment:    Patient tolerated session well.  Held exercises and discussed performing therex every other day.      Plan:  Continue to work on stretching and incorporating LE strengthening exercises.     Goals:   STG  Pt will demonstrate an excellent understanding of MITRA precautions and time frames in 2 weeks  Goal achieved  The patient will report an increase in LEFS score to 45 in 4 weeks Demonstrating a clinically significant change (MCID 9).   Goal achieved  Pt will be able to perform STS without UE support in 4 weeks.  No progress  LTG  The patient will demonstrate a decrease in average TUG time to <10 seconds to demonstrate a decreased fall prediction rate.   Minimal progress  The patient will perform a single leg stance >10 seconds to demonstrate no fall risk bilaterally.  The patient will report an increase in LEFS score to 55. Demonstrating a clinically significant change (MCID 9).   Pt will be able to walk continuously for 30 mins for cardiovascular benefits - patient stated goal.     Coreen Davis, PT   "

## 2024-07-16 ENCOUNTER — TREATMENT (OUTPATIENT)
Dept: PHYSICAL THERAPY | Facility: CLINIC | Age: 74
End: 2024-07-16
Payer: MEDICARE

## 2024-07-16 DIAGNOSIS — M25.552 PAIN OF LEFT HIP JOINT: Primary | ICD-10-CM

## 2024-07-16 PROCEDURE — 97140 MANUAL THERAPY 1/> REGIONS: CPT | Mod: GP,CQ

## 2024-07-16 PROCEDURE — 97110 THERAPEUTIC EXERCISES: CPT | Mod: GP,CQ

## 2024-07-22 NOTE — PROGRESS NOTES
Physical Therapy  Physical Therapy Treatment Note    Patient Name: Victorina Velez  MRN: [id number]  Today's Date: 7/23/2024  Time Calculation  Start Time: 0915  Stop Time: 1000  Time Calculation (min): 45 min  PT Therapeutic Procedures Time Entry  Therapeutic Exercise Time Entry: 25  PT Modalities Time Entry  E-Stim (Unattended) Time Entry: 10     Insurance:  Visit number: 21 of med nec   Authorization info: no auth   Insurance Type: Payor: MEDICARE / Plan: MEDICARE PART A AND B / Product Type: *No Product type* /   Current Problem  1. Pain of left hip joint  Follow Up In Physical Therapy          General    2/19/24 for L posterior THR by Dr. Ruiz.  Past Medical History Relevant to Rehab: breast CA with lumpectomy, dyspnea, metatarsal bone fracture, adenocarcinoma of lung with lung lobectomy, hypothyroidism, UTI, pneumonia, mastectomy, hip surgery, anxiety, OP, HA, elbow surgery  Family/Caregiver Present: No  Precautions:   MITRA 2/19/24 posterior hip precautions  Subjective:   Subjective   Patient reports that her hip is feeling pretty good.  My glut/back is feeling sore though.        Objective:   Lower Extremity Functional Movements 5/16/24  Transfers: STS from low table height able to perform with rocking  Gait: B knee valgus. Decreased stride length L. Decreased TKE L. Decreased hip extension B.   SL Balance: unable to perform on L side with pain. R side 4.5 seconds  STS UE support on table 9 reps 30 seconds.  5/16/24 12 reps soreness on L sided low back  STS without UE unable to perform safely  5/16/24 10 reps with rocking - need to re-test with no rocking permitted    MMT 5/16/24  Knee extension R 5  L 4  Knee flexion      R 5  L 4  Dorsiflex            R 5  L 4+    TUG with SPC: average 11.75 seconds  5/16/24 11.8 seconds average with SPC  6 minute walk test: 3 mins 11 seconds stopped due to SOB and not wanting to push this with medical history. While ambulating over 1 minute anterior groin pain occurs.  "  5/16/24 6 min walk 870 feet    Outcome Measures:  Other Measures  Lower Extremity Funtional Score (LEFS): 48     Treatments:   Access Code [email or web address] (updated 5/21/24)    THERE EX (25')    -Upright bike 10'  Manual    - standing hamstring stretch on step 3 x 30\"  - standing hip flexor stretch biasing add 3 x 30\"  - review HEP     MODALITIES:  Attended stim with thermaprobe to L glut/LB with 5 bar heating x 10 min     Assessment:    Patient tolerated session well.      Plan:  1 visit and planned discharge at that time    Goals:   STG  Pt will demonstrate an excellent understanding of MITRA precautions and time frames in 2 weeks  Goal achieved  The patient will report an increase in LEFS score to 45 in 4 weeks Demonstrating a clinically significant change (MCID 9).   Goal achieved  Pt will be able to perform STS without UE support in 4 weeks.  No progress  LTG  The patient will demonstrate a decrease in average TUG time to <10 seconds to demonstrate a decreased fall prediction rate.   Minimal progress  The patient will perform a single leg stance >10 seconds to demonstrate no fall risk bilaterally.  The patient will report an increase in LEFS score to 55. Demonstrating a clinically significant change (MCID 9).   Pt will be able to walk continuously for 30 mins for cardiovascular benefits - patient stated goal.     Coreen Davis, PT   "

## 2024-07-23 ENCOUNTER — TREATMENT (OUTPATIENT)
Dept: PHYSICAL THERAPY | Facility: CLINIC | Age: 74
End: 2024-07-23
Payer: MEDICARE

## 2024-07-23 DIAGNOSIS — M25.552 PAIN OF LEFT HIP JOINT: Primary | ICD-10-CM

## 2024-07-23 PROCEDURE — 97110 THERAPEUTIC EXERCISES: CPT | Mod: GP,CQ

## 2024-07-23 PROCEDURE — 97014 ELECTRIC STIMULATION THERAPY: CPT | Mod: GP,CQ

## 2024-07-30 ENCOUNTER — TREATMENT (OUTPATIENT)
Dept: PHYSICAL THERAPY | Facility: CLINIC | Age: 74
End: 2024-07-30
Payer: MEDICARE

## 2024-07-30 DIAGNOSIS — Z47.1 AFTERCARE FOLLOWING LEFT HIP JOINT REPLACEMENT SURGERY: ICD-10-CM

## 2024-07-30 DIAGNOSIS — M25.552 PAIN OF LEFT HIP JOINT: Primary | ICD-10-CM

## 2024-07-30 DIAGNOSIS — Z96.642 AFTERCARE FOLLOWING LEFT HIP JOINT REPLACEMENT SURGERY: ICD-10-CM

## 2024-07-30 PROCEDURE — 97164 PT RE-EVAL EST PLAN CARE: CPT | Mod: GP | Performed by: PHYSICAL THERAPIST

## 2024-07-30 PROCEDURE — 97110 THERAPEUTIC EXERCISES: CPT | Mod: GP | Performed by: PHYSICAL THERAPIST

## 2024-07-30 NOTE — PROGRESS NOTES
Physical Therapy  Physical Therapy Treatment Note    Patient Name: Victorina Velez  MRN: [id number]  Today's Date: 7/30/2024  Time Calculation  Start Time: 0845  Stop Time: 0930  Time Calculation (min): 45 min  PT Therapeutic Procedures Time Entry  Therapeutic Exercise Time Entry: 18        Insurance:  Visit number: 22 of med nec   Authorization info: no auth   Insurance Type: Payor: MEDICARE / Plan: MEDICARE PART A AND B / Product Type: *No Product type* /   Current Problem  1. Pain of left hip joint        2. Aftercare following left hip joint replacement surgery              General    2/19/24 for L posterior THR by Dr. Ruiz.  Past Medical History Relevant to Rehab: breast CA with lumpectomy, dyspnea, metatarsal bone fracture, adenocarcinoma of lung with lung lobectomy, hypothyroidism, UTI, pneumonia, mastectomy, hip surgery, anxiety, OP, HA, elbow surgery  Family/Caregiver Present: No  Precautions:   MITRA 2/19/24 posterior hip precautions  Subjective:   Subjective   Patient reports significant improvements in symptoms and function. Reports she is sleeping 7 hours per night and working out with a  1x per week with stationary bike 20 mins per day. She monitors her HR with exercise and is compliant with HEP.     Objective:   Lower Extremity Functional Movements 7/30/24  Transfers: STS from low table height able to perform no support  Gait: B knee valgus. Decreased stride length L. Decreased TKE L.  SL Balance: L 5.5 seconds. R side 4.5 seconds    STS UE support on table 9 reps 30 seconds.  5/16/24 12 reps soreness on L sided low back  7/30/14 15 reps    MMT 7/30/24  Knee extension R 5  L 4  Knee flexion      R 5  L 4+  Dorsiflex            R 5  L 5    TUG with SPC: average 11.75 seconds  5/16/24 11.8 seconds average with SPC  7/30/24 7.9 seconds no assistive device    6 minute walk test: 3 mins 11 seconds stopped due to SOB and not wanting to push this with medical history. While ambulating over 1 minute  anterior groin pain occurs.   24 6 min walk 870 feet  24 6 min walk 1267 feet    Outcome Measures:  Other Measures  Lower Extremity Funtional Score (LEFS): 48       24: 63      Treatments:   Access Code [email or web address] (updated 24)    THERE EX  -Upright bike 10'  Manual  Re-assessment includes objective measure, subjective reports, functional outcomes, goal setting, and plan of care review.   Home exercise program provided and reviewed. Pt verbalized and demonstrated a good understanding of the program. [employer] access code placed in chart.     Assessment:    Patient tolerated session well.      Plan:  DC to HEP. Prognosis is good.   Physical Therapy  Discharge Summary    Name: Victorina Velez  : [id number]  Date: 2024    Discharge Summary: Physical Therapy    Discharge Information: Date of discharge 24    Discharge Status: Prognosis at DC is good secondary to progress during skilled care, function at time of discharge, and patient understanding of home program    Rehab Discharge Reason: Achieved all and/or the most significant goals(s)   Goals:   STG  Pt will demonstrate an excellent understanding of MITRA precautions and time frames in 2 weeks  Goal achieved  The patient will report an increase in LEFS score to 45 in 4 weeks Demonstrating a clinically significant change (MCID 9).   Goal achieved  Pt will be able to perform STS without UE support in 4 weeks.  Goal achieved  LTG  The patient will demonstrate a decrease in average TUG time to <10 seconds to demonstrate a decreased fall prediction rate.   Minimal progress  Goal achieved  The patient will perform a single leg stance >10 seconds to demonstrate no fall risk bilaterally.  Good progress  The patient will report an increase in LEFS score to 55. Demonstrating a clinically significant change (MCID 9).   Goal achieved  Pt will be able to walk continuously for 30 mins for cardiovascular benefits - patient stated goal.   24 pt reported  15 minutes 50% progress    CamiloARNAUD Nicole, PT

## 2024-08-20 ENCOUNTER — APPOINTMENT (OUTPATIENT)
Dept: OBSTETRICS AND GYNECOLOGY | Facility: CLINIC | Age: 74
End: 2024-08-20
Payer: MEDICARE

## 2024-08-20 VITALS
WEIGHT: 145 LBS | HEIGHT: 65 IN | BODY MASS INDEX: 24.16 KG/M2 | DIASTOLIC BLOOD PRESSURE: 82 MMHG | SYSTOLIC BLOOD PRESSURE: 116 MMHG

## 2024-08-20 DIAGNOSIS — N95.2 VAGINAL ATROPHY: ICD-10-CM

## 2024-08-20 DIAGNOSIS — Z01.419 ENCOUNTER FOR GYNECOLOGICAL EXAMINATION WITHOUT ABNORMAL FINDING: Primary | ICD-10-CM

## 2024-08-20 RX ORDER — ESTRADIOL 0.1 MG/G
0.5 CREAM VAGINAL 3 TIMES WEEKLY
Qty: 42.5 G | Refills: 6 | Status: SHIPPED | OUTPATIENT
Start: 2024-08-21

## 2024-08-20 RX ORDER — EZETIMIBE AND SIMVASTATIN 10; 10 MG/1; MG/1
1 TABLET ORAL NIGHTLY
COMMUNITY
End: 2024-08-23 | Stop reason: WASHOUT

## 2024-08-20 NOTE — PROGRESS NOTES
Subjective   Victorina Velez is a 73 y.o. female here for GYN care.  She has a history of lung cancer in 2014 and left breast cancer in 2016.    She has no postmenopausal bleeding or pelvic pain.  No dysuria, no change in bowel habits or vaginal discharge.  She is current on her colonoscopy.   Personal health questionnaire reviewed: yes.     Gynecologic History  No LMP recorded. Patient is postmenopausal.  Contraception: post menopausal status  Last Pap: 19. Results were: normal  Last mammogram: 24. Results were: normal    Obstetric History  OB History    Para Term  AB Living   0 0 0 0 0 0   SAB IAB Ectopic Multiple Live Births   0 0 0 0 0       Objective   Constitutional: Alert and in no acute distress. Well developed, well nourished.   Head and Face: Head and face: Normal.    Eyes: Normal external exam - nonicteric sclera, extraocular movements intact (EOMI) and no ptosis.   Neck: No neck asymmetry. Supple. Thyroid not enlarged and there were no palpable thyroid nodules.    Pulmonary: No respiratory distress.   Chest: Breasts: Normal appearance, no nipple discharge and no skin changes. Palpation of breasts and axillae: No palpable mass and no axillary lymphadenopathy.   Abdomen: Soft nontender; no abdominal mass palpated. No organomegaly. No hernias.   Genitourinary: External genitalia: Normal. No inguinal lymphadenopathy. Bartholin's Urethral and Skenes Glands: Normal. Urethra: Normal.  Bladder: Normal on palpation. Vagina: Normal. Cervix: Normal.  Uterus: Normal.  Right Adnexa/parametria: Normal.  Left Adnexa/parametria: Normal.  Inspection of Perianal Area: Normal.   Musculoskeletal: No joint swelling seen, normal movements of all extremities.   Skin: Normal skin color and pigmentation, normal skin turgor, and no rash.   Neurologic: Non-focal. Grossly intact.   Psychiatric: Alert and oriented x 3. Affect normal to patient baseline. Mood: Appropriate.  Physical Exam     Assessment/Plan    This is a 73-year-old female with a normal exam.  She has dryness from the genitourinary syndrome of menopause.  We discussed filling estradiol cream.  She prefers to use the applicator, and will continue to use a blueberry-sized amount at the vaginal opening 3 times weekly.    She is current on her breast imaging with the breast specialist.    No further Pap smears are needed.,  Her Pap in 2019 was normal, high risk HPV negative.  I will see her in 2 years, or sooner as needed.    Education reviewed: self breast exams.

## 2024-08-23 DIAGNOSIS — E78.00 HYPERCHOLESTEROLEMIA: Primary | ICD-10-CM

## 2024-08-23 RX ORDER — EZETIMIBE 10 MG/1
10 TABLET ORAL DAILY
Qty: 90 TABLET | Refills: 3 | Status: SHIPPED | OUTPATIENT
Start: 2024-08-23 | End: 2025-08-18

## 2024-08-26 DIAGNOSIS — C34.92 ADENOCARCINOMA OF LEFT LUNG (MULTI): Primary | ICD-10-CM

## 2024-08-31 DIAGNOSIS — Z00.00 HEALTH MAINTENANCE EXAMINATION: ICD-10-CM

## 2024-09-03 RX ORDER — OMEPRAZOLE 20 MG/1
40 CAPSULE, DELAYED RELEASE ORAL DAILY
Qty: 180 CAPSULE | Refills: 1 | Status: SHIPPED | OUTPATIENT
Start: 2024-09-03 | End: 2025-03-02

## 2024-09-10 ENCOUNTER — HOSPITAL ENCOUNTER (OUTPATIENT)
Dept: RADIOLOGY | Facility: CLINIC | Age: 74
Discharge: HOME | End: 2024-09-10
Payer: MEDICARE

## 2024-09-10 DIAGNOSIS — C34.92 ADENOCARCINOMA OF LEFT LUNG (MULTI): ICD-10-CM

## 2024-09-10 PROCEDURE — 71250 CT THORAX DX C-: CPT

## 2024-09-10 PROCEDURE — 71250 CT THORAX DX C-: CPT | Performed by: RADIOLOGY

## 2024-09-13 ENCOUNTER — APPOINTMENT (OUTPATIENT)
Dept: OPHTHALMOLOGY | Facility: CLINIC | Age: 74
End: 2024-09-13
Payer: MEDICARE

## 2024-09-13 DIAGNOSIS — H04.123 DRY EYE SYNDROME OF BOTH LACRIMAL GLANDS: ICD-10-CM

## 2024-09-13 DIAGNOSIS — H43.811 PVD (POSTERIOR VITREOUS DETACHMENT), RIGHT EYE: ICD-10-CM

## 2024-09-13 DIAGNOSIS — H26.493 PCO (POSTERIOR CAPSULAR OPACIFICATION), BILATERAL: Primary | ICD-10-CM

## 2024-09-13 DIAGNOSIS — H35.363 DRUSEN OF MACULA OF BOTH EYES: ICD-10-CM

## 2024-09-13 PROCEDURE — 99214 OFFICE O/P EST MOD 30 MIN: CPT | Performed by: OPHTHALMOLOGY

## 2024-09-13 PROCEDURE — 92134 CPTRZ OPH DX IMG PST SGM RTA: CPT | Performed by: OPHTHALMOLOGY

## 2024-09-13 ASSESSMENT — REFRACTION_MANIFEST
OS_CYLINDER: -0.50
OD_AXIS: 115
OD_ADD: +2.50
OD_SPHERE: PLANO
OS_SPHERE: +0.25
OS_AXIS: 100
OD_SPHERE: PLANO
OS_SPHERE: +0.25
OD_CYLINDER: -1.00
METHOD_AUTOREFRACTION: 1
OS_CYLINDER: -0.50
OS_ADD: +2.50
OD_AXIS: 115
OD_CYLINDER: -1.00
OS_AXIS: 100

## 2024-09-13 ASSESSMENT — CONF VISUAL FIELD
OS_NORMAL: 1
OD_NORMAL: 1
OS_INFERIOR_TEMPORAL_RESTRICTION: 0
OS_INFERIOR_NASAL_RESTRICTION: 0
OD_SUPERIOR_NASAL_RESTRICTION: 0
OD_SUPERIOR_TEMPORAL_RESTRICTION: 0
OS_SUPERIOR_NASAL_RESTRICTION: 0
OS_SUPERIOR_TEMPORAL_RESTRICTION: 0
OD_INFERIOR_NASAL_RESTRICTION: 0
OD_INFERIOR_TEMPORAL_RESTRICTION: 0

## 2024-09-13 ASSESSMENT — VISUAL ACUITY
OD_SC: J2+
OS_SC: J1
METHOD: SNELLEN - LINEAR
OD_SC: 20/25+2

## 2024-09-13 ASSESSMENT — EXTERNAL EXAM - LEFT EYE: OS_EXAM: NORMAL

## 2024-09-13 ASSESSMENT — ENCOUNTER SYMPTOMS: EYES NEGATIVE: 1

## 2024-09-13 ASSESSMENT — EXTERNAL EXAM - RIGHT EYE: OD_EXAM: NORMAL

## 2024-09-13 ASSESSMENT — CUP TO DISC RATIO
OS_RATIO: .3
OD_RATIO: .3

## 2024-09-13 ASSESSMENT — TONOMETRY
OD_IOP_MMHG: 12
OS_IOP_MMHG: 12
IOP_METHOD: GOLDMANN APPLANATION

## 2024-09-13 NOTE — PROGRESS NOTES
Assessment/Plan   Diagnoses and all orders for this visit:  PCO (posterior capsular opacification), bilateral  More Visually significant   Discussed with pt r/b/a of YAG  She would like to schedule    Drusen of macula of both eyes  Stable  Monitor  -     OCT, Retina - OU - Both Eyes    Dry eye syndrome of both lacrimal glands  ATs    PVD (posterior vitreous detachment), right eye  Stable  Monitor    Next available for YAG (OD). Please dilate only OD.  1-2 weeks afterwards for YAG OS.

## 2024-09-28 ENCOUNTER — PATIENT MESSAGE (OUTPATIENT)
Dept: OBSTETRICS AND GYNECOLOGY | Facility: CLINIC | Age: 74
End: 2024-09-28
Payer: MEDICARE

## 2024-09-30 ENCOUNTER — APPOINTMENT (OUTPATIENT)
Dept: PODIATRY | Facility: CLINIC | Age: 74
End: 2024-09-30
Payer: MEDICARE

## 2024-10-22 ENCOUNTER — APPOINTMENT (OUTPATIENT)
Dept: OPHTHALMOLOGY | Facility: CLINIC | Age: 74
End: 2024-10-22
Payer: MEDICARE

## 2024-10-22 DIAGNOSIS — H26.493 PCO (POSTERIOR CAPSULAR OPACIFICATION), BILATERAL: Primary | ICD-10-CM

## 2024-10-22 PROCEDURE — 66821 AFTER CATARACT LASER SURGERY: CPT | Mod: RIGHT SIDE | Performed by: OPHTHALMOLOGY

## 2024-10-22 ASSESSMENT — VISUAL ACUITY
OD_BAT_MED: 20/50
METHOD: SNELLEN - LINEAR
OD_SC: 20/30

## 2024-10-22 ASSESSMENT — EXTERNAL EXAM - RIGHT EYE: OD_EXAM: NORMAL

## 2024-10-22 ASSESSMENT — EXTERNAL EXAM - LEFT EYE: OS_EXAM: NORMAL

## 2024-10-22 ASSESSMENT — CUP TO DISC RATIO: OD_RATIO: .3

## 2024-10-22 NOTE — PROGRESS NOTES
Assessment/Plan   Diagnoses and all orders for this visit:  PCO (posterior capsular opacification), bilateral  Visually significant PCO OU (OD>OS)  -     YAG Capsulotomy - OD - Right Eye    2 weeks for YAG OS

## 2024-11-07 ENCOUNTER — APPOINTMENT (OUTPATIENT)
Dept: OPHTHALMOLOGY | Facility: CLINIC | Age: 74
End: 2024-11-07
Payer: MEDICARE

## 2024-11-07 DIAGNOSIS — H35.363 DRUSEN STAGE MACULAR DEGENERATION OF BOTH EYES: ICD-10-CM

## 2024-11-07 DIAGNOSIS — H26.493 PCO (POSTERIOR CAPSULAR OPACIFICATION), BILATERAL: Primary | ICD-10-CM

## 2024-11-07 PROCEDURE — 92134 CPTRZ OPH DX IMG PST SGM RTA: CPT | Performed by: OPHTHALMOLOGY

## 2024-11-07 PROCEDURE — 99024 POSTOP FOLLOW-UP VISIT: CPT | Performed by: OPHTHALMOLOGY

## 2024-11-07 RX ORDER — MULTIVITAMIN
1 TABLET ORAL ONCE
COMMUNITY

## 2024-11-07 ASSESSMENT — TONOMETRY
OD_IOP_MMHG: 17
IOP_METHOD: TONOPEN
OS_IOP_MMHG: 18

## 2024-11-07 ASSESSMENT — VISUAL ACUITY
METHOD: SNELLEN - LINEAR
OD_SC: 20/25
OS_SC: 20/20-1

## 2024-11-07 ASSESSMENT — CONF VISUAL FIELD
OD_SUPERIOR_TEMPORAL_RESTRICTION: 0
OS_SUPERIOR_NASAL_RESTRICTION: 0
OS_INFERIOR_NASAL_RESTRICTION: 0
OS_SUPERIOR_TEMPORAL_RESTRICTION: 0
OD_NORMAL: 1
OS_INFERIOR_TEMPORAL_RESTRICTION: 0
OS_NORMAL: 1
OD_SUPERIOR_NASAL_RESTRICTION: 0
OD_INFERIOR_NASAL_RESTRICTION: 0
OD_INFERIOR_TEMPORAL_RESTRICTION: 0

## 2024-11-07 ASSESSMENT — ENCOUNTER SYMPTOMS: EYES NEGATIVE: 1

## 2024-11-07 ASSESSMENT — EXTERNAL EXAM - RIGHT EYE: OD_EXAM: NORMAL

## 2024-11-07 ASSESSMENT — CUP TO DISC RATIO: OS_RATIO: 0.3

## 2024-11-07 ASSESSMENT — EXTERNAL EXAM - LEFT EYE: OS_EXAM: NORMAL

## 2024-11-07 NOTE — PROGRESS NOTES
Assessment/Plan   Diagnoses and all orders for this visit:  PCO (posterior capsular opacification), bilateral  POW 2 s/p YAG OD  Initially felt big improvement in her vision but then started to notice OD is blurry this week  VA improved one line c/w pre YAG  Pt uses gel gtts and that could be causing blurry vision for her  Advised to use gel gtts qhs and regular PFATs during the day  Defer YAG OS since pt is not symptomatic now  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  -     OCT, Retina - OU - Both Eyes

## 2024-12-19 ENCOUNTER — OFFICE VISIT (OUTPATIENT)
Dept: URGENT CARE | Age: 74
End: 2024-12-19
Payer: MEDICARE

## 2024-12-19 VITALS
TEMPERATURE: 97.8 F | SYSTOLIC BLOOD PRESSURE: 132 MMHG | HEART RATE: 107 BPM | DIASTOLIC BLOOD PRESSURE: 95 MMHG | OXYGEN SATURATION: 98 % | RESPIRATION RATE: 16 BRPM

## 2024-12-19 DIAGNOSIS — S09.90XA INJURY OF HEAD, INITIAL ENCOUNTER: Primary | ICD-10-CM
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
## 2024-12-19 NOTE — PROGRESS NOTES
Subjective   Patient ID: Victorina Velez is a 74 y.o. female. They present today with a chief complaint of Head Injury (C/O head injury post falling about 20 mins ago. No LOC. ).    History of Present Illness  Fell hit face/head, < 30min. PTA, dizzy.    Denies LOC, vomiting, change in vision, confusion, headache.     Brother will transport to [facility] ED.       Head Injury      Past Medical History  Allergies as of 12/19/2024    (No Known Allergies)       (Not in a hospital admission)       Past Medical History:   Diagnosis Date    Anxiety     Chronic rhinitis 04/24/2019    Chronic rhinitis    Dry eyes     Dysphonia 08/26/2021    Hoarseness    Dyspnea, unspecified 02/09/2016    Acute dyspnea    Familial hypercholesterolemia 04/24/2018    Essential familial hypercholesterolemia    GERD (gastroesophageal reflux disease)     HX: breast cancer 09/2016    s/p partial mastectomy w/ radiation and tamoxifen (ductal and lobular carcinoma in situ)    Hypothyroidism     Osteoporosis     Pericarditis ([facility])     Personal history of irradiation     Personal history of other endocrine, nutritional and metabolic disease 04/27/2020    History of hypothyroidism    Personal history of pneumonia (recurrent) 07/28/2014    History of pneumonia    Pleurodynia 04/17/2015    Chest pain, pleuritic    Primary lung cancer (Multi) 2014    s/p concurrent chemoRT, RLL lobectomy and mediastinal node dissection, adjuvent chemo in [id number]    Rosacea     Urinary tract infection        Past Surgical History:   Procedure Laterality Date    BREAST LUMPECTOMY Left 09/09/2016    Postive for Ductal and lobular carcinoma in situ    BRONCHOSCOPY  09/24/2014    w/ mediastinoscopy (Positive for primary lung cancer w/ mets to LN)    CATARACT EXTRACTION      CATARACT EXTRACTION, BILATERAL Bilateral 2022    ELBOW FRACTURE SURGERY Left 06/12/2015    ORIF    HIP SURGERY Right 02/21/2017    Hip Surgery- 2022    LUNG LOBECTOMY Right 12/18/2014    RLL lobectomy and  mediastinal LN dissection    LUNG SURGERY Right 04/23/2015    VATS for pleural effusion s/p right lower lobectomy w/ chest tube    MASTECTOMY, PARTIAL Left 09/26/2016    Positve margins on prior lumpectomy    TOTAL HIP ARTHROPLASTY Left 02/19/2024        reports that she has quit smoking. Her smoking use included cigarettes. She has never used smokeless tobacco. She reports current alcohol use of about 1.0 standard drink of alcohol per week. She reports that she does not use drugs.    Review of Systems  Review of Systems                               Objective    Vitals:    12/19/24 1555   BP: (!) 132/95   BP Location: Left arm   Patient Position: Sitting   BP Cuff Size: Adult   Pulse: 107   Resp: 16   Temp: 36.6 °C (97.8 °F)   TempSrc: Oral   SpO2: 98%     No LMP recorded. Patient is postmenopausal.    Physical Exam  Neurological:      General: No focal deficit present.      Mental Status: She is oriented to person, place, and time.         Procedures    Point of Care Test & Imaging Results from this visit  No results found for this visit on 12/19/24.   No results found.    Diagnostic study results (if any) were reviewed by Alma Leon PA-C.    Assessment/Plan   Allergies, medications, history, and pertinent labs/EKGs/Imaging reviewed by Alma Leon PA-C.         Orders and Diagnoses  Diagnoses and all orders for this visit:  Injury of head, initial encounter  Fall, initial encounter      Medical Admin Record      Patient disposition: ED    Electronically signed by Alma Leon PA-C  3:58 PM

## 2024-12-22 NOTE — PROGRESS NOTES
Subjective   Patient ID: Victorina Velez is a 74 y.o. female who presents for facial swelling.    HPI   Since discharge the emergency department December 19, the patient reports experiencing intermittent episodes of tenderness over the bridge of the nose.  She reports that the episodes only occur with palpation of the region.  She reports no associated symptoms.  Since discharge, she reports constant soreness in the region of the laceration of the upper lip.  She reports no exacerbating or relieving factors..  She reports no fever, chills, drainage from the site.  No other associated symptoms.    Since yesterday, she reports a history of intermittent episodes of soreness over the entire left breast.  She reports no precipitating, exacerbating, relieving factors.  No other associated symptoms.  The patient reports no fever, headaches, visual changes, slurred speech, focal weakness/paresthesias, phonophobia, photophobia  Review of Systems    Objective   There were no vitals taken for this visit.    Physical Exam  Head-area of ecchymosis and swelling noted over the bridge of the nose extending to the left periorbital region and left upper cheek.   Palpation did reveal tenderness over the bridge of the nose and left upper cheek regions, but no increase in warmth.  Eyes-extraocular movements intact.  Pupils equal and reactive to light.  Fundi revealed good retinal color, no hemorrhages or exudates  Mouth-There is also an area ecchymosis and swelling located just above the mid portions of the upper lip and lower lip..  Laceration left upper lip-no necrotic tissue noted.  No drainage noted.  There is some surrounding erythema and swelling.  Palpation revealed increased tenderness, no increase in warmth  Breasts  Left breast-no erythema or swelling.  No nipple discharge noted.  Palpation revealed no tenderness or masses, no axillary lymphadenopathy noted.  Lungs-clear  Cardiac-rate normal, rhythm regular, no murmurs, no  JVD  Assessment/Plan     Problem List Items Addressed This Visit    None  Visit Diagnoses         Codes    Lip laceration, initial encounter    -  Primary S01.511A    Facial contusion, initial encounter     S00.83XA    Relevant Orders    CT maxillofacial bones wo IV contrast             Assessment  Intermittent episodes of tenderness noted over the bridge of the nose-probably secondary to fracture of nasal bones  Constant soreness in the upper lip-likely secondary to laceration upper lip  Laceration upper lip  Intermittent episodes of soreness in the left breast-May be secondary to a contusion  Plan  Obtain CT of the facial bones ASAP.  I told the patient that she could use Advil 400 mg p.o. every 6 hours as needed pain and/or Tylenol 1000 mg p.o. every 6 hours as needed pain.  The patient will call me in 7 days with her condition

## 2024-12-24 ENCOUNTER — HOSPITAL ENCOUNTER (OUTPATIENT)
Dept: RADIOLOGY | Facility: CLINIC | Age: 74
Discharge: HOME | End: 2024-12-24
Payer: MEDICARE

## 2024-12-24 ENCOUNTER — APPOINTMENT (OUTPATIENT)
Dept: PRIMARY CARE | Facility: CLINIC | Age: 74
End: 2024-12-24
Payer: MEDICARE

## 2024-12-24 VITALS — SYSTOLIC BLOOD PRESSURE: 100 MMHG | DIASTOLIC BLOOD PRESSURE: 70 MMHG | HEART RATE: 80 BPM

## 2024-12-24 DIAGNOSIS — S00.83XA FACIAL CONTUSION, INITIAL ENCOUNTER: ICD-10-CM

## 2024-12-24 DIAGNOSIS — S01.511A LIP LACERATION, INITIAL ENCOUNTER: Primary | ICD-10-CM
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
## 2024-12-24 PROCEDURE — 70486 CT MAXILLOFACIAL W/O DYE: CPT

## 2024-12-24 PROCEDURE — 76377 3D RENDER W/INTRP POSTPROCES: CPT | Performed by: RADIOLOGY

## 2024-12-24 PROCEDURE — 76377 3D RENDER W/INTRP POSTPROCES: CPT

## 2024-12-24 PROCEDURE — 1160F RVW MEDS BY RX/DR IN RCRD: CPT | Performed by: INTERNAL MEDICINE

## 2024-12-24 PROCEDURE — 70486 CT MAXILLOFACIAL W/O DYE: CPT | Performed by: RADIOLOGY

## 2024-12-24 PROCEDURE — 1159F MED LIST DOCD IN RCRD: CPT | Performed by: INTERNAL MEDICINE

## 2024-12-24 PROCEDURE — 99213 OFFICE O/P EST LOW 20 MIN: CPT | Performed by: INTERNAL MEDICINE

## 2024-12-24 RX ORDER — BIOTIN 5 MG
TABLET ORAL
COMMUNITY

## 2024-12-24 RX ORDER — CEPHALEXIN 500 MG/1
500 CAPSULE ORAL 4 TIMES DAILY
COMMUNITY
Start: 2024-12-19 | End: 2024-12-24

## 2024-12-31 NOTE — PROGRESS NOTES
Victorina Velez female   1950 74 y.o.   [id number]      Chief Complaint  Annual mammogram and exam, history of left breast cancer    History Of Present Illness  Victorina Velez is a very pleasant 74 year old  woman diagnosed 8/2016 with left papillary carcinoma with suspicious focus for invasion with ductal carcinoma in situ (DCIS), 4.9cm, ER >95%, OH 50%, HER-2/tona negative (1+). Her breast cancer was found incidentally with a PET scan as workup for her lung cancer. 9/9/2016 Dr. Bryan Anderson performed left lumpectomy noting DCIS and lobular carcinoma in situ (LCIS), no microinvasion. 9/6/2016 she required left reexcision to clear margins. She completed radiation 11/2016 and completed 5 years of tamoxifen therapy 1/2017 -1/2022. She has family history of breast cancer in her mother, paternal grandmother and paternal cousin. She presents today for annual mammogram and exam. She denies any new masses or lumps. She does report a fall about three weeks ago resulting in five stitches. No evidence of broken bones. She states a couple days ago she woke up and felt dizzy when she sat up.   Stage 0 pTis     LUNG CANCER TREATMENT HISTORY:                 1. T1a (vs T3, 2 lesion in same lobe)pN2 vs cN3M0, stage IIIA vs IIIB, RLL lung  adenocarcinoma diagnosed on 9/24/2014 by mediastinoscopy biopsy of station 7  nodes. TTF-1(+).   2.concurrent chemo RT with carboplatin AUC 5 and pemetrexed 500mg/m2 q21d for 2  cycle, a total of 45 cGy in 25 fx, complete on 11/28/2014  3. RLL lobectomy and mediastinal node dissection on 12/18/2014 with negative  margins and negative node involvement, [id number] on surgical resection   adjuvant chemo carboplatin AUC 6 and pemetrexed 500mg/m2 on 2/2/15 for one  cycle  4. Recurrent right pleural effusion after thoracic surgery. She underwent VATS on 4/23/2015, no evidence of cancer recurrence on direct visualization,  cytology (-). Pleurodeses was not successful and she had temporal chest  tube  placement for drainage.     BREAST IMAGIN2024 Bilateral screening mammogram, indicates BI-RADS Category 2.     FEMALE HISTORY: menarche age 12, nullipara, OCP's x 15 yrs, menopause age 49, HRT x 1 yr, heterogeneously dense breast tissue     FAMILY CANCER HISTORY  Mother: Breast cancer, 72  Paternal Grandmother: Breast cancer  Maternal Cousin: Breast cancer  Father: Colon cancer age 54      Surgical History  She has a past surgical history that includes Hip surgery (Right, 2017); Elbow fracture surgery (Left, 2015); Cataract extraction, bilateral (Bilateral, ); Mastectomy, partial (Left, 2016); Breast lumpectomy (Left, 2016); Lung surgery (Right, 2015); Lung lobectomy (Right, 2014); Bronchoscopy (2014); Total hip arthroplasty (Left, 2024); Cataract extraction; and Joint replacement.     Social History  She reports that she has quit smoking. Her smoking use included cigarettes. She has never used smokeless tobacco. She reports current alcohol use of about 1.0 standard drink of alcohol per week. She reports that she does not use drugs.    Family History  Family History   Problem Relation Name Age of Onset    Breast cancer Mother Jacqui         Allergies  Patient has no known allergies.    Medications  Current Outpatient Medications   Medication Instructions    biotin 5 mg tablet Take by mouth.    calcium carbonate (CALCIUM 500 ORAL) 1 capsule, 2 times weekly    cholecalciferol (Vitamin D-3) 125 MCG (5000 UT) capsule 1 tablet, Daily    estradiol (ESTRACE) 0.5 g, vaginal, 3 times weekly    ezetimibe (ZETIA) 10 mg, oral, Daily    levocetirizine (XYZAL) 5 mg, Every evening    magnesium oxide (MAG-OX) 400 mg, Daily    multivitamin tablet 1 tablet, Once    omeprazole (PRILOSEC) 40 mg, oral, Daily, Do not crush or chew.    THYROID ORAL 1 Capful, Daily         REVIEW OF SYSTEMS    Constitutional:  Negative for appetite change, fatigue, fever and unexpected weight  change.   HENT:  Negative for ear pain, hearing loss, nosebleeds, sore throat and trouble swallowing.    Eyes:  Negative for discharge, itching and visual disturbance.   Respiratory:  Negative for cough, chest tightness and shortness of breath.    Cardiovascular:  Negative for chest pain, palpitations and leg swelling.   Breast: as indicated in HPI  Gastrointestinal:  Negative for abdominal pain, constipation, diarrhea and nausea.   Endocrine: Negative for cold intolerance and heat intolerance.   Genitourinary:  Negative for dysuria, frequency, hematuria, pelvic pain and vaginal bleeding.   Musculoskeletal:  Negative for arthralgias, back pain, gait problem, joint swelling and myalgias.   Skin:  Negative for color change and rash.   Allergic/Immunologic: Negative for environmental allergies and food allergies.   Neurological:  Negative for dizziness, tremors, speech difficulty, weakness, numbness and headaches.   Hematological:  Does not bruise/bleed easily.   Psychiatric/Behavioral:  Negative for agitation, dysphoric mood and sleep disturbance. The patient is not nervous/anxious.         Past Medical History  She has a past medical history of Anxiety, BRCA1 negative (Dont remember), BRCA2 negative (Don’t remember), Breast cancer (Multi), Chronic rhinitis (04/24/2019), Dry eyes, Dysphonia (08/26/2021), Dyspnea, unspecified (02/09/2016), Familial hypercholesterolemia (04/24/2018), GERD (gastroesophageal reflux disease), antineoplastic chemo (2014), breast cancer (09/2016), Hypothyroidism, Lobular carcinoma in situ (2016), Osteoporosis, Pericarditis ([facility]), Personal history of irradiation, Personal history of other endocrine, nutritional and metabolic disease (04/27/2020), Personal history of pneumonia (recurrent) (07/28/2014), Pleurodynia (04/17/2015), Primary lung cancer (Multi) (2014), Rosacea, and Urinary tract infection.     Physical Exam  Patient is alert and oriented x3 and in a relaxed and appropriate mood.  Her gait is steady and hand grasps are equal. Sclera is clear. The breasts are asymmetrical, right larger. The left breast has a well-healed central lateral incision with associated significant tissue divot and palpable scar tissue. The tissue of both breasts is dense centrally and softer below without palpable abnormalities, discrete nodules or masses. The skin and nipples appear normal. There is no cervical, supraclavicular, or axillary lymphadenopathy palpable. Heart rate and rhythm normal, S1 and S2 appreciated. The lungs are clear bilaterally. Abdomen is soft & non-tender.         Physical Exam  Chest:              Last Recorded Vitals  Vitals:    01/13/25 0801   BP: 125/84   Pulse: 108   Temp: 36.3 °C (97.3 °F)         Relevant Results   Time was spent viewing digital images of the radiology testing with the patient, results pending    Assessment/Plan   Normal clinical exam, screening mammogram, history of left breast cancer, no evidence of recurrence, post operative breast asymmetry, Tamoxifen therapy completed 5 years, history of lung cancer, family history of breast cancer, heterogeneously dense tissue     Plan: Return in one year for bilateral screening mammogram and office visit. Encouraged to reach out to PCP or ENT regarding dizziness upon sitting up.       Patient Discussion/Summary  Your clinical examination is normal. Please return in one year for bilateral screening mammogram and office visit or sooner if you have any problems or concerns.     You can see your health information, review clinical summaries from office visits & test results online when you follow your health with MY  Chart, a personal health record. To sign up go to [email or web address]. If you need assistance with signing up or trouble getting into your account call [employer] Patient Line 24/7 at [phone].    My office phone number is [phone] if you need to get in touch with me or have additional questions or  concerns. Thank you for choosing [facility] and trusting me as your healthcare provider. I look forward to seeing you again at your next office visit. I am honored to be a provider on your health care team and I remain dedicated to helping you achieve your health goals.      Wendy Haider, APRN-CNP

## 2025-01-13 ENCOUNTER — HOSPITAL ENCOUNTER (OUTPATIENT)
Dept: RADIOLOGY | Facility: CLINIC | Age: 75
Discharge: HOME | End: 2025-01-13
Payer: MEDICARE

## 2025-01-13 ENCOUNTER — APPOINTMENT (OUTPATIENT)
Dept: SURGICAL ONCOLOGY | Facility: CLINIC | Age: 75
End: 2025-01-13
Payer: MEDICARE

## 2025-01-13 VITALS
TEMPERATURE: 97.3 F | BODY MASS INDEX: 24.63 KG/M2 | WEIGHT: 148 LBS | DIASTOLIC BLOOD PRESSURE: 84 MMHG | HEART RATE: 108 BPM | SYSTOLIC BLOOD PRESSURE: 125 MMHG

## 2025-01-13 VITALS — HEIGHT: 65 IN | WEIGHT: 145.06 LBS | BODY MASS INDEX: 24.17 KG/M2

## 2025-01-13 DIAGNOSIS — Z12.31 ENCOUNTER FOR SCREENING MAMMOGRAM FOR MALIGNANT NEOPLASM OF BREAST: ICD-10-CM

## 2025-01-13 DIAGNOSIS — Z85.3 ENCOUNTER FOR FOLLOW-UP SURVEILLANCE OF BREAST CANCER: Primary | ICD-10-CM

## 2025-01-13 DIAGNOSIS — Z08 ENCOUNTER FOR FOLLOW-UP SURVEILLANCE OF BREAST CANCER: ICD-10-CM

## 2025-01-13 DIAGNOSIS — Z08 ENCOUNTER FOR FOLLOW-UP SURVEILLANCE OF BREAST CANCER: Primary | ICD-10-CM

## 2025-01-13 DIAGNOSIS — Z85.3 ENCOUNTER FOR FOLLOW-UP SURVEILLANCE OF BREAST CANCER: ICD-10-CM

## 2025-01-13 PROCEDURE — 77067 SCR MAMMO BI INCL CAD: CPT

## 2025-01-13 PROCEDURE — 1126F AMNT PAIN NOTED NONE PRSNT: CPT | Performed by: NURSE PRACTITIONER

## 2025-01-13 PROCEDURE — 77063 BREAST TOMOSYNTHESIS BI: CPT | Performed by: RADIOLOGY

## 2025-01-13 PROCEDURE — 77067 SCR MAMMO BI INCL CAD: CPT | Performed by: RADIOLOGY

## 2025-01-13 PROCEDURE — 99213 OFFICE O/P EST LOW 20 MIN: CPT | Performed by: NURSE PRACTITIONER

## 2025-01-13 PROCEDURE — 1036F TOBACCO NON-USER: CPT | Performed by: NURSE PRACTITIONER

## 2025-01-13 PROCEDURE — 1159F MED LIST DOCD IN RCRD: CPT | Performed by: NURSE PRACTITIONER

## 2025-01-13 ASSESSMENT — PATIENT HEALTH QUESTIONNAIRE - PHQ9
2. FEELING DOWN, DEPRESSED OR HOPELESS: NOT AT ALL
1. LITTLE INTEREST OR PLEASURE IN DOING THINGS: NOT AT ALL
SUM OF ALL RESPONSES TO PHQ9 QUESTIONS 1 & 2: 0

## 2025-01-13 ASSESSMENT — PAIN SCALES - GENERAL: PAINLEVEL_OUTOF10: 0-NO PAIN

## 2025-01-13 NOTE — PATIENT INSTRUCTIONS
Your clinical examination is normal. Please return in one year for bilateral screening mammogram and office visit or sooner if you have any problems or concerns.     You can see your health information, review clinical summaries from office visits & test results online when you follow your health with MY  Chart, a personal health record. To sign up go to [email or web address]. If you need assistance with signing up or trouble getting into your account call [employer] Patient Line 24/7 at [phone].    My office phone number is [phone] if you need to get in touch with me or have additional questions or concerns. Thank you for choosing [facility] and trusting me as your healthcare provider. I look forward to seeing you again at your next office visit. I am honored to be a provider on your health care team and I remain dedicated to helping you achieve your health goal

## 2025-01-14 PROBLEM — E86.0 DEHYDRATION: Status: ACTIVE | Noted: 2025-01-14

## 2025-01-14 PROBLEM — R42 DIZZINESS: Status: ACTIVE | Noted: 2025-01-14

## 2025-01-14 NOTE — PROGRESS NOTES
"Subjective   Patient ID: Victorina Velez is a 74 y.o. female who presents for dizziness/lightheadedness    HPI   Since the morning of January 10, the patient reports experiencing intermittent episodes of dizziness.  She describes the dizziness as a sensation of being off balance and \"her head not feeling right\".  She reports that the episodes have occurred both during and in the absence of head movement.  She does report that she has never experienced an episode while lying in bed..  She reports that the duration of each episode varies but she is unable to actually quantitate the timing of each episode.  She does report associated constant tightness over the posterior surface of the neck and associated dehydration.  She still reports continued chronic constant numbness underneath the third fourth and fifth toes of both feet, continued chronic intermittent episodes of numbness in the fingers of both hands times years-unchanged over the past 5 days.  She reports no associated visual changes, slurred speech, focal weakness, nausea, vomiting.  Review of Systems    Objective   There were no vitals taken for this visit.    Physical Exam  Head-palpation revealed no tenderness over the maxillary or frontal sinuses.  Eyes- extraocular movements intact ;pupils equal and reactive to light; fundi revealed good retinal color, no hemorrhages or exudates  Ears- palpation of pinnas and traguses revealed no tenderness. External auditory canals not erythematous or swollen.  TMs clear    Mouth -no xerostomia noted. Posterior pharynx is not erythematous or swollen. Tonsillar pillars appeared normal no exudates  Neck no lymphadenopathy. Thyroid gland not enlarged. , No bruits.  Lungs-clear to auscultation bilaterally  Cardiac-rate normal rhythm regular no murmurs no JVD    Neurologic  Mental status-alert and oriented x3   Cranial nerves-2 through 12 grossly intact, no visual field abnormalities  Motor-no pronator drift noted, strength-5/5 " "in all muscle groups tested, except plantar flexors of both ankles 4/5, no tremor noted.  No bradykinesia noted.  No rigidity noted.  Negative pull test  Sensory-Light touch sensation fully intact  Pinprick sensation fully intact  Vibratory sensation not present distal to the right knee, markedly diminished distal to the left knee, diminished in both knees bilaterally right greater than left  Cerebellar-no truncal ataxia, good coordination finger-nose testing,, good coordination heel-to-shin testing, normal rapid alternating movements  Positive Romberg; moderately decreased coordination in tandem gait  Reflexes-  ankle jerks 0/4 bilaterally, all other reflexes tested 2+/4 bilaterally    Caern-Hallpike maneuver positive with rotation of the head bilaterally.  Positive latency, positive fatigability, negative habituation  Assessment/Plan   Problem List Items Addressed This Visit             ICD-10-CM    Dizziness - Primary R42    Relevant Medications    meclizine (Antivert) 12.5 mg tablet    Other Relevant Orders    CBC and Auto Differential    Comprehensive Metabolic Panel    Thyroid Stimulating Hormone    Dehydration E86.0    Relevant Orders    CBC and Auto Differential    Comprehensive Metabolic Panel    Thyroid Stimulating Hormone    Urinalysis with Reflex Culture and Microscopic     Other Visit Diagnoses         Codes    Lightheadedness     R42    Relevant Orders    CBC and Auto Differential    Comprehensive Metabolic Panel    Thyroid Stimulating Hormone    Acquired hypothyroidism     E03.9    Relevant Orders    Thyroid Stimulating Hormone    Other general symptoms and signs     R68.89    Relevant Orders    Urinalysis with Reflex Culture and Microscopic             Assessment  Intermittent episodes of dizziness described as a sensation of being off balance\" head not feeling right\"-May be secondary to BPPV, cervicogenic vertigo  Constant tightness in the neck-unsure of etiology  Plan  Obtain CBC differential, CMP, " TSH, urinalysis today.  If above studies unremarkable, I will consider obtaining an MRI/MRA of the brain and internal auditory canals.  Begin meclizine 12.5 mg p.o. 3 times daily as needed.  The patient may require a referral for vestibular therapy.

## 2025-01-15 ENCOUNTER — LAB (OUTPATIENT)
Dept: LAB | Facility: LAB | Age: 75
End: 2025-01-15
Payer: MEDICARE

## 2025-01-15 ENCOUNTER — OFFICE VISIT (OUTPATIENT)
Dept: PRIMARY CARE | Facility: CLINIC | Age: 75
End: 2025-01-15
Payer: MEDICARE

## 2025-01-15 VITALS
TEMPERATURE: 97.1 F | SYSTOLIC BLOOD PRESSURE: 108 MMHG | WEIGHT: 145 LBS | BODY MASS INDEX: 24.16 KG/M2 | HEART RATE: 100 BPM | DIASTOLIC BLOOD PRESSURE: 86 MMHG | HEIGHT: 65 IN

## 2025-01-15 DIAGNOSIS — R68.89 OTHER GENERAL SYMPTOMS AND SIGNS: ICD-10-CM

## 2025-01-15 DIAGNOSIS — R42 LIGHTHEADEDNESS: ICD-10-CM

## 2025-01-15 DIAGNOSIS — R42 DIZZINESS: Primary | ICD-10-CM

## 2025-01-15 DIAGNOSIS — E03.9 ACQUIRED HYPOTHYROIDISM: ICD-10-CM

## 2025-01-15 DIAGNOSIS — R79.9 ABNORMAL FINDING OF BLOOD CHEMISTRY, UNSPECIFIED: ICD-10-CM

## 2025-01-15 DIAGNOSIS — E86.0 DEHYDRATION: ICD-10-CM

## 2025-01-15 DIAGNOSIS — R73.03 PREDIABETES: ICD-10-CM

## 2025-01-15 DIAGNOSIS — R42 DIZZINESS: ICD-10-CM

## 2025-01-15 LAB
ALBUMIN SERPL BCP-MCNC: 4.6 G/DL (ref 3.4–5)
ALP SERPL-CCNC: 85 U/L (ref 33–136)
ALT SERPL W P-5'-P-CCNC: 27 U/L (ref 7–45)
ANION GAP SERPL CALC-SCNC: 15 MMOL/L (ref 10–20)
APPEARANCE UR: CLEAR
AST SERPL W P-5'-P-CCNC: 21 U/L (ref 9–39)
BACTERIA #/AREA URNS AUTO: ABNORMAL /HPF
BASOPHILS # BLD AUTO: 0.03 X10*3/UL (ref 0–0.1)
BASOPHILS NFR BLD AUTO: 0.5 %
BILIRUB SERPL-MCNC: 1 MG/DL (ref 0–1.2)
BILIRUB UR STRIP.AUTO-MCNC: NEGATIVE MG/DL
BUN SERPL-MCNC: 19 MG/DL (ref 6–23)
CALCIUM SERPL-MCNC: 10 MG/DL (ref 8.6–10.6)
CHLORIDE SERPL-SCNC: 103 MMOL/L (ref 98–107)
CHOLEST SERPL-MCNC: 277 MG/DL (ref 0–199)
CHOLESTEROL/HDL RATIO: 5
CO2 SERPL-SCNC: 26 MMOL/L (ref 21–32)
COLOR UR: ABNORMAL
CREAT SERPL-MCNC: 0.82 MG/DL (ref 0.5–1.05)
EGFRCR SERPLBLD CKD-EPI 2021: 75 ML/MIN/1.73M*2
EOSINOPHIL # BLD AUTO: 0.05 X10*3/UL (ref 0–0.4)
EOSINOPHIL NFR BLD AUTO: 0.9 %
ERYTHROCYTE [DISTWIDTH] IN BLOOD BY AUTOMATED COUNT: 13.4 % (ref 11.5–14.5)
EST. AVERAGE GLUCOSE BLD GHB EST-MCNC: 108 MG/DL
GLUCOSE SERPL-MCNC: 105 MG/DL (ref 74–99)
GLUCOSE UR STRIP.AUTO-MCNC: NORMAL MG/DL
HBA1C MFR BLD: 5.4 %
HCT VFR BLD AUTO: 44.1 % (ref 36–46)
HDLC SERPL-MCNC: 55.1 MG/DL
HGB BLD-MCNC: 14.1 G/DL (ref 12–16)
HOLD SPECIMEN: NORMAL
IMM GRANULOCYTES # BLD AUTO: 0.02 X10*3/UL (ref 0–0.5)
IMM GRANULOCYTES NFR BLD AUTO: 0.4 % (ref 0–0.9)
KETONES UR STRIP.AUTO-MCNC: NEGATIVE MG/DL
LDLC SERPL CALC-MCNC: 181 MG/DL
LEUKOCYTE ESTERASE UR QL STRIP.AUTO: NEGATIVE
LYMPHOCYTES # BLD AUTO: 1.33 X10*3/UL (ref 0.8–3)
LYMPHOCYTES NFR BLD AUTO: 24.1 %
MCH RBC QN AUTO: 29.5 PG (ref 26–34)
MCHC RBC AUTO-ENTMCNC: 32 G/DL (ref 32–36)
MCV RBC AUTO: 92 FL (ref 80–100)
MONOCYTES # BLD AUTO: 0.57 X10*3/UL (ref 0.05–0.8)
MONOCYTES NFR BLD AUTO: 10.3 %
MUCOUS THREADS #/AREA URNS AUTO: ABNORMAL /LPF
NEUTROPHILS # BLD AUTO: 3.52 X10*3/UL (ref 1.6–5.5)
NEUTROPHILS NFR BLD AUTO: 63.8 %
NITRITE UR QL STRIP.AUTO: NEGATIVE
NON HDL CHOLESTEROL: 222 MG/DL (ref 0–149)
NRBC BLD-RTO: 0 /100 WBCS (ref 0–0)
PH UR STRIP.AUTO: 6 [PH]
PLATELET # BLD AUTO: 304 X10*3/UL (ref 150–450)
POTASSIUM SERPL-SCNC: 4.2 MMOL/L (ref 3.5–5.3)
PROT SERPL-MCNC: 7.4 G/DL (ref 6.4–8.2)
PROT UR STRIP.AUTO-MCNC: NEGATIVE MG/DL
RBC # BLD AUTO: 4.78 X10*6/UL (ref 4–5.2)
RBC # UR STRIP.AUTO: ABNORMAL /UL
RBC #/AREA URNS AUTO: ABNORMAL /HPF
SODIUM SERPL-SCNC: 140 MMOL/L (ref 136–145)
SP GR UR STRIP.AUTO: 1.01
SQUAMOUS #/AREA URNS AUTO: ABNORMAL /HPF
TRIGL SERPL-MCNC: 206 MG/DL (ref 0–149)
TSH SERPL-ACNC: 2.79 MIU/L (ref 0.44–3.98)
UROBILINOGEN UR STRIP.AUTO-MCNC: NORMAL MG/DL
VLDL: 41 MG/DL (ref 0–40)
WBC # BLD AUTO: 5.5 X10*3/UL (ref 4.4–11.3)
WBC #/AREA URNS AUTO: ABNORMAL /HPF

## 2025-01-15 PROCEDURE — 80061 LIPID PANEL: CPT

## 2025-01-15 PROCEDURE — 80053 COMPREHEN METABOLIC PANEL: CPT

## 2025-01-15 PROCEDURE — 83036 HEMOGLOBIN GLYCOSYLATED A1C: CPT

## 2025-01-15 PROCEDURE — 99214 OFFICE O/P EST MOD 30 MIN: CPT | Performed by: INTERNAL MEDICINE

## 2025-01-15 PROCEDURE — 81001 URINALYSIS AUTO W/SCOPE: CPT

## 2025-01-15 PROCEDURE — 1036F TOBACCO NON-USER: CPT | Performed by: INTERNAL MEDICINE

## 2025-01-15 PROCEDURE — 84443 ASSAY THYROID STIM HORMONE: CPT

## 2025-01-15 PROCEDURE — 85025 COMPLETE CBC W/AUTO DIFF WBC: CPT

## 2025-01-15 PROCEDURE — 1160F RVW MEDS BY RX/DR IN RCRD: CPT | Performed by: INTERNAL MEDICINE

## 2025-01-15 PROCEDURE — 1159F MED LIST DOCD IN RCRD: CPT | Performed by: INTERNAL MEDICINE

## 2025-01-15 RX ORDER — MECLIZINE HCL 12.5 MG 12.5 MG/1
12.5 TABLET ORAL 3 TIMES DAILY PRN
Qty: 30 TABLET | Refills: 1 | Status: SHIPPED | OUTPATIENT
Start: 2025-01-15 | End: 2026-01-15

## 2025-01-17 ENCOUNTER — HOSPITAL ENCOUNTER (OUTPATIENT)
Dept: RADIOLOGY | Facility: CLINIC | Age: 75
Discharge: HOME | End: 2025-01-17
Payer: MEDICARE

## 2025-01-17 DIAGNOSIS — R42 DIZZINESS: ICD-10-CM

## 2025-01-17 PROCEDURE — 70450 CT HEAD/BRAIN W/O DYE: CPT

## 2025-01-17 PROCEDURE — 2550000001 HC RX 255 CONTRASTS: Performed by: INTERNAL MEDICINE

## 2025-01-17 PROCEDURE — 70481 CT ORBIT/EAR/FOSSA W/DYE: CPT

## 2025-01-17 RX ADMIN — IOHEXOL 50 ML: 350 INJECTION, SOLUTION INTRAVENOUS at 14:43

## 2025-01-18 DIAGNOSIS — E78.5 DYSLIPIDEMIA: Primary | ICD-10-CM

## 2025-01-18 RX ORDER — ALIROCUMAB 75 MG/ML
75 INJECTION, SOLUTION SUBCUTANEOUS
Qty: 2 ML | Refills: 2 | Status: SHIPPED | OUTPATIENT
Start: 2025-01-18

## 2025-01-19 PROCEDURE — RXMED WILLOW AMBULATORY MEDICATION CHARGE

## 2025-01-21 ENCOUNTER — PHARMACY VISIT (OUTPATIENT)
Dept: PHARMACY | Facility: CLINIC | Age: 75
End: 2025-01-21
Payer: COMMERCIAL

## 2025-01-28 ENCOUNTER — CLINICAL SUPPORT (OUTPATIENT)
Dept: AUDIOLOGY | Facility: CLINIC | Age: 75
End: 2025-01-28
Payer: MEDICARE

## 2025-01-28 ENCOUNTER — APPOINTMENT (OUTPATIENT)
Dept: OTOLARYNGOLOGY | Facility: CLINIC | Age: 75
End: 2025-01-28
Payer: MEDICARE

## 2025-01-28 VITALS — BODY MASS INDEX: 24.16 KG/M2 | HEIGHT: 65 IN | WEIGHT: 145 LBS

## 2025-01-28 DIAGNOSIS — R42 VERTIGO: ICD-10-CM

## 2025-01-28 DIAGNOSIS — R42 DIZZINESS: Primary | ICD-10-CM

## 2025-01-28 DIAGNOSIS — H61.23 BILATERAL IMPACTED CERUMEN: Primary | ICD-10-CM

## 2025-01-28 DIAGNOSIS — H81.12 BENIGN PAROXYSMAL POSITIONAL VERTIGO OF LEFT EAR: ICD-10-CM

## 2025-01-28 PROCEDURE — 92550 TYMPANOMETRY & REFLEX THRESH: CPT | Mod: 52 | Performed by: AUDIOLOGIST

## 2025-01-28 PROCEDURE — 92557 COMPREHENSIVE HEARING TEST: CPT | Performed by: AUDIOLOGIST

## 2025-01-28 PROCEDURE — 1159F MED LIST DOCD IN RCRD: CPT | Performed by: NURSE PRACTITIONER

## 2025-01-28 PROCEDURE — 1160F RVW MEDS BY RX/DR IN RCRD: CPT | Performed by: NURSE PRACTITIONER

## 2025-01-28 PROCEDURE — 1036F TOBACCO NON-USER: CPT | Performed by: NURSE PRACTITIONER

## 2025-01-28 PROCEDURE — 69210 REMOVE IMPACTED EAR WAX UNI: CPT | Performed by: NURSE PRACTITIONER

## 2025-01-28 PROCEDURE — 99213 OFFICE O/P EST LOW 20 MIN: CPT | Performed by: NURSE PRACTITIONER

## 2025-01-28 ASSESSMENT — PATIENT HEALTH QUESTIONNAIRE - PHQ9
2. FEELING DOWN, DEPRESSED OR HOPELESS: NOT AT ALL
1. LITTLE INTEREST OR PLEASURE IN DOING THINGS: NOT AT ALL
SUM OF ALL RESPONSES TO PHQ9 QUESTIONS 1 AND 2: 0

## 2025-01-28 ASSESSMENT — ENCOUNTER SYMPTOMS: DIZZINESS: 1

## 2025-01-28 NOTE — PROGRESS NOTES
Chief Complaint   Patient presents with    Dizziness       HISTORY:  Victorina Velez, age 74 years, was seen for audiogram in conjunction with otolaryngology appointment on 1/28/2025.  Ms. Velez completed hip replacement surgery 2024.  On 12/19/24 she fell while retrieving her mail and suffered facial injuries requiring stitches.  No facial fractures occurred.  Three weeks after the fall, Ms. Velez reported onset of positional dizziness especially when sitting up or standing up.  Head and brain CT were clear.  Ms. Velez denies hearing loss, ear pain and ear pressure.  She does note recurrent ear popping.  She has completed treatment for both breast and lung cancer.    RESULTS:  Prior to testing both external auditory canals were clear and tympanic membranes visualized    Immittance and acoustic reflexes:  Immittance testing yielded TYPE A tympanograms indicating normal middle ear function both ears  Acoustic reflexes were present 500 - 4000 Hz both ears    Audiogram:  Normal hearing levels were obtained 125 - 8000 Hz both ears, slight loss 4000 - 8000 Hz left ear  Speech reception thresholds obtained at 10 dBHL both ears  Speech discrimination scores were 100% at 40 dBHL    IMPRESSIONS:  Normal middle ear function noted both ears  Normal acoustic reflexes noted both ears  Essentially normal hearing levels both ears with slight loss 4000 -8000 Hz left ear only    RECOMMENDATIONS:  1.  Follow up with otolaryngology  2.  Retest hearing levels annually    time: 1019 - 1038

## 2025-01-28 NOTE — PROGRESS NOTES
BENIGN PAROXYSMAL POSITIONAL VERTIGO (BPPV) EVALUATION      Name:  Victorina Velez  :  1950  Age:  74 y.o.  Date of Evaluation:  2025    IMPRESSIONS     Positive evidence for active Benign Paroxysmal Positional Vertigo (BPPV) given the following: presence of torsional up-beating nystagmus present in the head left position. Nystagmus decayed over time. Patient reported symptoms of dizziness. Reversal seen upon returning to sitting position. Indicative of left posterior canalithiasis. Canalith repositioning treatment (i.e. Epley) was performed prior to leaving today's appointment. Patient tolerated treatment well.    RECOMMENDATIONS     Should symptoms continue, consider returning for a follow-up BPPV Evaluation to re-assess and provide further treatment if necessary.  Maintain a healthy lifestyle to help body function overall.  Continue monitoring per ENT/PCP preference.    Time: 7933-5173    HISTORY     Patient was seen for Benign Paroxysmal Positional Vertigo (BPPV) Evaluation due to a history of dizziness/imbalance. Vestibular case history collected via patient-clinician interview, patient chart review, and patient questionnaires.     Patient describes dizziness as vertigo/spinning. Symptoms began following a fall in 2024 to her left side. Symptoms began two to three weeks following fall. Denied any previous dizziness symptoms. Episodes now last several seconds to minutes before subsiding. Symptoms are provoked by getting out of bed and standing quickly. Medical history includes breast and lung cancer with chemotherapy treatment. Most recent vertigo evaluation performed on 2025 by Kylee Mckeon CNP revealed positive BPPV to the left. Epley maneuver performed same day. Patient reported improvement in symptoms since receiving canalith repositioning treatment on that date. However notes some mild symptoms remain.     No significant neck pain or restriction which would contraindicate  "portions of testing today.    EVALUATION     See VNG Raw Data in \"Media\"    TEST RESULTS     BEDSIDE ASSESSMENT TEST  The bedside assessment is to further assist in differential diagnosis and screen for eye abnormalities which may affect testing.    Extra-Ocular Range of Motion: normal.Extra-Ocular Range of Motion is performed to evaluate any eye abnormalities prior to testing.  Cervical Neck Exam: normal. Cervical Neck is performed to evaluate any restrictions or pain with neck movement prior to testing.  Vertebral Artery Screen: normal. Vertebral Artery Screen is performed to evaluate for vertebrobasilar insufficiency prior to testing. .      BENIGN PAROXYSMAL POSITIONAL VERTIGO (BPPV) TEST  Evaluation protocol provides objective indications of peripheral vestibulo-ocular pathway involvement and active BPPV. Spontaneous & Positional testing is conducted using a dual channel video-recording technique for the recording of eye movement in the horizontal and vertical planes.    Spontaneous Nystagmus test was absent. Spontaneous nystagmus testing may help with the identification of an acute or uncompensated peripheral vestibular lesion.   Caren-Hallpike testing was abnormal given the presence of torsional up-beating nystagmus present in the head left position. Nystagmus decayed over time. Patient reported symptoms of dizziness. Reversal seen upon returning to sitting position. Indicative of left posterior canalithiasis. Caren-Hallpike testing is to provide a diagnosis of BPPV of the vertical semicircular canals on the side which is most affected.  Side-Lying testing was not performed. Side-Lying testing is to provide diagnosis of BPPV of the vertical semicircular canals on the side most affected when [place]-Hallpike is contraindicated.  Deep Head Hang testing was not performed. Deep Head Hang testing is to provide further diagnosis of BPPV of the anterior semicircular canals.  Roll testing was deferred on this date due to " time constraints. Roll testing is to provide a diagnosis of BPPV of the horizontal semicircular canals on the side which is most affected.  Katonah & Lean testing was not performed. Katonah & Lean testing is to provide further diagnosis of BPPV of the horizontal semicircular canals on the side which is most affected.    It should be noted, performed testing bedside as patient has had recent injury to the nose/face and pain with goggles. Removed goggles following Spontaneous Nystagmus testing.    TREATMENT     CANALITH REPOSITIONING TREATMENT (CRT)  The CRT portion of the evaluation to perform a treatment maneuver to address positive evidence of BPPV observed.     Canalith repositioning treatment (i.e. Epley) was performed prior to leaving today's appointment. Patient tolerated treatment well.      Testing and interpretation of results completed by ESTELLA Rodriguez, CCC-A. It was my pleasure to evaluate this patient.     Estella Rodriguez, CCC-A  Senior Clinical Vestibular Audiologist

## 2025-01-31 ENCOUNTER — CLINICAL SUPPORT (OUTPATIENT)
Dept: AUDIOLOGY | Facility: CLINIC | Age: 75
End: 2025-01-31
Payer: MEDICARE

## 2025-01-31 DIAGNOSIS — R42 DIZZINESS AND GIDDINESS: Primary | ICD-10-CM

## 2025-01-31 PROCEDURE — 92541 SPONTANEOUS NYSTAGMUS TEST: CPT

## 2025-01-31 NOTE — PATIENT INSTRUCTIONS
BALANCE FUNCTION TEST (BFT)  AFTER VISIT SUMMARY      TESTING SUMMARY     The purpose of today's testing was to evaluate for any vestibular system (inner ear) involvement to account for your symptoms of dizziness/imbalance. Deep inside each of your ears, there are 5 balance organs which contribute to your ability to maintain balance and reduce dizziness. Our vestibular system involves 3 semicircular canals (“spinning detectors”) and 2 otolith organs (“gravity sensors”).    The most common cause of inner ear related dizziness is Benign Paroxysmal Positional Vertigo (BPPV). BPPV can be explained as:  Benign: not life-threatening  Paroxysmal: short-lasting, brief  Positional: triggered by head or body movements  Vertigo: sensation of spinning    BPPV is caused by displacement of calcium carbonate crystals in the inner ear. This is often caused by head injury, aging, diabetes, migraines, etc. When the crystals are displaced, they can become trapped in one of the inner ear's semicircular canals. When this occurs, certain head and body movements can cause the crystals to stimulate the nerve or send a false message to your brain that you are spinning when you are physically not. This spinning sensation can come with other symptoms including nausea, vomiting, disorientation, imbalance, or lightheadedness.    IMPRESSIONS     Based on today's evaluation, there is evidence of active BPPV. We treated you for this condition today. The treatment maneuver is performed in an effort to move the detached crystals out of the semicircular canal and back to the otolith organs where they belong. Once these crystals are out of the semicircular canals, they will no longer cause symptoms.     RECOMMENDATIONS     Should symptoms continue, consider returning for a follow-up BPPV Evaluation to re-assess and provide further treatment if necessary.   Continue medical follow up with Kylee Mckeon CNP.  Maintain a healthy lifestyle to help  body function overall.    Testing and interpretation of results completed by Estella Rodriguez CCC-A. It was my pleasure to evaluate this patient.       Estella Rodriguez, CCC-A  Senior Clinical Vestibular Audiologist

## 2025-02-05 ENCOUNTER — OFFICE VISIT (OUTPATIENT)
Dept: OTOLARYNGOLOGY | Facility: CLINIC | Age: 75
End: 2025-02-05
Payer: MEDICARE

## 2025-02-05 VITALS — HEIGHT: 65 IN | WEIGHT: 145 LBS | BODY MASS INDEX: 24.16 KG/M2

## 2025-02-05 DIAGNOSIS — R42 VERTIGO: Primary | ICD-10-CM

## 2025-02-05 DIAGNOSIS — M43.6 NECK STIFFNESS: ICD-10-CM

## 2025-02-05 DIAGNOSIS — H81.12 BENIGN PAROXYSMAL POSITIONAL VERTIGO OF LEFT EAR: ICD-10-CM

## 2025-02-05 DIAGNOSIS — M54.2 NECK PAIN: ICD-10-CM

## 2025-02-05 PROCEDURE — 1036F TOBACCO NON-USER: CPT | Performed by: NURSE PRACTITIONER

## 2025-02-05 PROCEDURE — 1160F RVW MEDS BY RX/DR IN RCRD: CPT | Performed by: NURSE PRACTITIONER

## 2025-02-05 PROCEDURE — 1159F MED LIST DOCD IN RCRD: CPT | Performed by: NURSE PRACTITIONER

## 2025-02-05 PROCEDURE — 99213 OFFICE O/P EST LOW 20 MIN: CPT | Performed by: NURSE PRACTITIONER

## 2025-02-05 ASSESSMENT — PATIENT HEALTH QUESTIONNAIRE - PHQ9
SUM OF ALL RESPONSES TO PHQ9 QUESTIONS 1 AND 2: 0
1. LITTLE INTEREST OR PLEASURE IN DOING THINGS: NOT AT ALL
2. FEELING DOWN, DEPRESSED OR HOPELESS: NOT AT ALL

## 2025-02-05 NOTE — PROGRESS NOTES
Subjective   Patient ID: Victorina Velez is a 74 y.o. female who presents for Vertigo.  HPI  This patient presents for a follow-up visit.  In review, she initially saw me 1/20/2025 for vertigo.  Exam findings consistent with left BPPV.  Patient also complained of significant neck pain/stiffness.  She followed up with audiology 1/31/2025.  Again, exam consistent with left BPPV and repeat Epley maneuver was performed.  Patient states that she was feeling well until 2/3/2025 when positionally triggered vertigo recurred.  Review of Systems  A comprehensive or 10 points review of the patient's constitutional, neurological, HEENT, pulmonary, cardiovascular and genito-urinary systems showed only those mentioned in history of present illness.    Objective   Physical Exam  Constitutional: no fever, chills, weight loss or weight gain   General appearance: Appears well, well-nourished, well groomed. No acute distress.   Communication: Normal communication   Psychiatric: Oriented to person, place and time. Normal mood and affect.   Neurologic: Cranial nerves II-XII grossly intact and symmetric bilaterally.   Head and Face:   Head: Atraumatic with no masses, lesions or scarring.   Face: Normal symmetry, no paralysis, synkinesis or facial tic. No scars or deformities.     Eyes: Conjunctiva not edematous or erythematous   Ears: External inspection of ears with no deformity, scars or masses.      Neck: Normal appearing, symmetric, trachea midline.   Cardiovascular: Examination of peripheral vascular system shows no clubbing or cyanosis.   Respiratory: No respiratory distress increased work of breathing. Inspection of the chest with symmetric chest expansion and normal respiratory effort.   Skin: No rashes in the head or neck    Pierson-Hallpike negative for nystagmus and head down the left and right positions, but patient reports sensation of vertigo and headdown left position.  Epley maneuver was performed.    Assessment/Plan        This  patient presents for subsequent evaluation of acute acquired vertigo, neck pain, neck stiffness.    I again recommended she maintain her head in neutral position for the next 3 days.  Since she had such a quick recurrence of symptoms, I also recommended a referral to vestibular physical therapy.  Patient is in agreement with the plan.  All questions were answered to patient's satisfaction.    This note was created using speech recognition transcription software. Despite proofreading, several typographical errors might be present that might affect the meaning of the content. Please call with any questions.      JAMAR Arellano-CNP 02/05/25 8:56 AM

## 2025-02-06 ENCOUNTER — APPOINTMENT (OUTPATIENT)
Dept: ORTHOPEDIC SURGERY | Facility: CLINIC | Age: 75
End: 2025-02-06
Payer: MEDICARE

## 2025-02-06 DIAGNOSIS — Z96.642 AFTERCARE FOLLOWING LEFT HIP JOINT REPLACEMENT SURGERY: ICD-10-CM

## 2025-02-06 DIAGNOSIS — Z47.1 AFTERCARE FOLLOWING LEFT HIP JOINT REPLACEMENT SURGERY: ICD-10-CM

## 2025-02-08 ENCOUNTER — PHARMACY VISIT (OUTPATIENT)
Dept: PHARMACY | Facility: CLINIC | Age: 75
End: 2025-02-08
Payer: COMMERCIAL

## 2025-02-08 PROCEDURE — RXMED WILLOW AMBULATORY MEDICATION CHARGE

## 2025-02-12 ENCOUNTER — OFFICE VISIT (OUTPATIENT)
Dept: URGENT CARE | Age: 75
End: 2025-02-12
Payer: MEDICARE

## 2025-02-12 VITALS
BODY MASS INDEX: 22.5 KG/M2 | HEIGHT: 66 IN | TEMPERATURE: 97.5 F | DIASTOLIC BLOOD PRESSURE: 92 MMHG | SYSTOLIC BLOOD PRESSURE: 140 MMHG | RESPIRATION RATE: 18 BRPM | OXYGEN SATURATION: 97 % | HEART RATE: 103 BPM | WEIGHT: 140 LBS

## 2025-02-12 DIAGNOSIS — J01.90 ACUTE NON-RECURRENT SINUSITIS, UNSPECIFIED LOCATION: ICD-10-CM

## 2025-02-12 DIAGNOSIS — R09.81 NASAL CONGESTION: ICD-10-CM

## 2025-02-12 DIAGNOSIS — S09.90XA INJURY OF HEAD, INITIAL ENCOUNTER: ICD-10-CM

## 2025-02-12 DIAGNOSIS — R03.0 ELEVATED BLOOD PRESSURE READING: ICD-10-CM
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
## 2025-02-12 DIAGNOSIS — S00.01XA ABRASION OF SCALP, INITIAL ENCOUNTER: ICD-10-CM

## 2025-02-12 LAB
POC RAPID INFLUENZA A: NEGATIVE
POC RAPID INFLUENZA B: NEGATIVE
POC SARS-COV-2 AG BINAX: NORMAL

## 2025-02-12 PROCEDURE — 1036F TOBACCO NON-USER: CPT | Performed by: STUDENT IN AN ORGANIZED HEALTH CARE EDUCATION/TRAINING PROGRAM

## 2025-02-12 PROCEDURE — 1159F MED LIST DOCD IN RCRD: CPT | Performed by: STUDENT IN AN ORGANIZED HEALTH CARE EDUCATION/TRAINING PROGRAM

## 2025-02-12 PROCEDURE — 87804 INFLUENZA ASSAY W/OPTIC: CPT | Performed by: STUDENT IN AN ORGANIZED HEALTH CARE EDUCATION/TRAINING PROGRAM

## 2025-02-12 PROCEDURE — 87811 SARS-COV-2 COVID19 W/OPTIC: CPT | Performed by: STUDENT IN AN ORGANIZED HEALTH CARE EDUCATION/TRAINING PROGRAM

## 2025-02-12 PROCEDURE — 99070 SPECIAL SUPPLIES PHYS/QHP: CPT | Performed by: STUDENT IN AN ORGANIZED HEALTH CARE EDUCATION/TRAINING PROGRAM

## 2025-02-12 PROCEDURE — 99213 OFFICE O/P EST LOW 20 MIN: CPT | Performed by: STUDENT IN AN ORGANIZED HEALTH CARE EDUCATION/TRAINING PROGRAM

## 2025-02-12 RX ORDER — AMOXICILLIN AND CLAVULANATE POTASSIUM 875; 125 MG/1; MG/1
1 TABLET, FILM COATED ORAL 2 TIMES DAILY
Qty: 14 TABLET | Refills: 0 | Status: SHIPPED | OUTPATIENT
Start: 2025-02-12 | End: 2025-02-19

## 2025-02-12 RX ORDER — AMOXICILLIN AND CLAVULANATE POTASSIUM 875; 125 MG/1; MG/1
1 TABLET, FILM COATED ORAL 2 TIMES DAILY
Qty: 14 TABLET | Refills: 0 | Status: SHIPPED | OUTPATIENT
Start: 2025-02-12 | End: 2025-02-12

## 2025-02-12 ASSESSMENT — ENCOUNTER SYMPTOMS
DIZZINESS: 1
SINUS PAIN: 1
SINUS PRESSURE: 1
WOUND: 1

## 2025-02-12 NOTE — PATIENT INSTRUCTIONS
I advise rest, fluids, Flonase nasal spray once daily, nasal saline rinses 2-3 times daily, humidifier in bedroom at night, Claritin or Zyrtec once daily for the next two weeks.    Please follow up with your primary provider or return to urgent care within one week if symptoms do not improve.  You may schedule an appointment online at [email or web address] or call [phone]. Go to the Emergency Department if symptoms significantly worsen or if you develop symptoms including but not limited to severe headache, dizziness ,fainting, confusion, vomiting, chest pain or shortness of breath.

## 2025-02-12 NOTE — PROGRESS NOTES
Subjective   Patient ID: Victorina Velez is a 74 y.o. female. They present today with a chief complaint of Sinusitis and Head Injury (Pt states she fell late afternoon yesterday, also complaints of sinus issues and earache pt also stated that when she fell that she hit her head and it stated to bleed ).    History of Present Illness  Pt presents with two complaints     1) fall. State slipped on water that was on the floor yesterday around 3pm while at home. She landed on R buttock and hit back of head on corner of cabinet. Sustained wound to the back of the head that was bleeding at the time but has stopped. - LOC, - thinners. States feels a bit off balance today. No headache, dizziness, vomiting, confusion, numbness tingling weakness changes in vision neck or back pain. States no other injury/pain from fall.     2) concern for sinus infection. 2-3 days with nasal congestion, sinus pressure, headache, PND, ears feeling like they are popping. Mucinex and flonase seem to help symptoms. No sick contacts. States has chronic cough 2/2 to lung cancer, not changed from baseline. No fever, chills, cp sob.       History provided by:  Patient      Past Medical History  Allergies as of 02/12/2025    (No Known Allergies)       (Not in a hospital admission)       Past Medical History:   Diagnosis Date    Anxiety     BRCA1 negative Dont remember    BRCA2 negative Don’t remember    Breast cancer (Multi)     Chronic rhinitis 04/24/2019    Chronic rhinitis    Dry eyes     Dysphonia 08/26/2021    Hoarseness    Dyspnea, unspecified 02/09/2016    Acute dyspnea    Familial hypercholesterolemia 04/24/2018    Essential familial hypercholesterolemia    GERD (gastroesophageal reflux disease)     Hx antineoplastic chemo 2014    HX: breast cancer 09/2016    s/p partial mastectomy w/ radiation and tamoxifen (ductal and lobular carcinoma in situ)    Hypothyroidism     Lobular carcinoma in situ 2016    Osteoporosis     Pericarditis ([facility])      Personal history of irradiation     Personal history of other endocrine, nutritional and metabolic disease 04/27/2020    History of hypothyroidism    Personal history of pneumonia (recurrent) 07/28/2014    History of pneumonia    Pleurodynia 04/17/2015    Chest pain, pleuritic    Primary lung cancer (Multi) 2014    s/p concurrent chemoRT, RLL lobectomy and mediastinal node dissection, adjuvent chemo in 0476-5124    Rosacea     Urinary tract infection        Past Surgical History:   Procedure Laterality Date    BREAST LUMPECTOMY Left 09/09/2016    Postive for Ductal and lobular carcinoma in situ    BRONCHOSCOPY  09/24/2014    w/ mediastinoscopy (Positive for primary lung cancer w/ mets to LN)    CATARACT EXTRACTION      CATARACT EXTRACTION, BILATERAL Bilateral 2022    ELBOW FRACTURE SURGERY Left 06/12/2015    ORIF    HIP SURGERY Right 02/21/2017    Hip Surgery- 2022    JOINT REPLACEMENT      LUNG LOBECTOMY Right 12/18/2014    RLL lobectomy and mediastinal LN dissection    LUNG SURGERY Right 04/23/2015    VATS for pleural effusion s/p right lower lobectomy w/ chest tube    MASTECTOMY, PARTIAL Left 09/26/2016    Positve margins on prior lumpectomy    TOTAL HIP ARTHROPLASTY Left 02/19/2024        reports that she has quit smoking. Her smoking use included cigarettes. She has never used smokeless tobacco. She reports current alcohol use of about 1.0 standard drink of alcohol per week. She reports that she does not use drugs.    Review of Systems  Review of Systems   HENT:  Positive for congestion, sinus pressure and sinus pain.    Skin:  Positive for wound.   Neurological:  Positive for dizziness.   All other systems reviewed and are negative.                                 Objective    Vitals:    02/12/25 1531   BP: (!) 140/92   BP Location: Right arm   Patient Position: Sitting   BP Cuff Size: Adult   Pulse: 103   Resp: 18   Temp: 36.4 °C (97.5 °F)   TempSrc: Oral   SpO2: 97%   Weight: 63.5 kg (140 lb)   Height: 1.676  "m (5' 6\")     No LMP recorded. Patient is postmenopausal.    Physical Exam  Vitals and nursing note reviewed.   Constitutional:       General: She is not in acute distress.     Appearance: Normal appearance. She is not ill-appearing, toxic-appearing or diaphoretic.   HENT:      Head: Normocephalic. No raccoon eyes, Bowles's sign or contusion.      Jaw: There is normal jaw occlusion.        Right Ear: Tympanic membrane, ear canal and external ear normal. No hemotympanum.      Left Ear: Tympanic membrane, ear canal and external ear normal. No hemotympanum.      Nose: Congestion present. No signs of injury or rhinorrhea.      Right Nostril: No foreign body, epistaxis, septal hematoma or occlusion.      Left Nostril: No foreign body, epistaxis, septal hematoma or occlusion.      Right Turbinates: Not enlarged, swollen or pale.      Left Turbinates: Not enlarged, swollen or pale.      Right Sinus: Maxillary sinus tenderness present. No frontal sinus tenderness.      Left Sinus: Maxillary sinus tenderness present. No frontal sinus tenderness.      Mouth/Throat:      Lips: Pink.      Mouth: Mucous membranes are moist.      Dentition: Normal dentition.      Tongue: No lesions.      Palate: No mass.      Pharynx: Oropharynx is clear. Uvula midline. No pharyngeal swelling, posterior oropharyngeal erythema, uvula swelling or postnasal drip.      Tonsils: No tonsillar exudate or tonsillar abscesses.   Cardiovascular:      Pulses: Normal pulses.   Pulmonary:      Effort: Pulmonary effort is normal. No respiratory distress.      Breath sounds: No wheezing, rhonchi or rales.   Musculoskeletal:      Cervical back: Normal.      Thoracic back: Normal.      Lumbar back: Normal.   Skin:     Capillary Refill: Capillary refill takes more than 3 seconds.   Neurological:      General: No focal deficit present.      Mental Status: She is alert.      Cranial Nerves: No cranial nerve deficit.      Sensory: No sensory deficit.      Motor: No " weakness.      Coordination: Coordination normal.      Gait: Gait normal.         Procedures    Point of Care Test & Imaging Results from this visit  Results for orders placed or performed in visit on 02/12/25   POCT Influenza A/B manually resulted   Result Value Ref Range    POC Rapid Influenza A Negative Negative    POC Rapid Influenza B Negative Negative   POCT BinaxNOW Covid-19 Ag Card manually resulted   Result Value Ref Range    POC SACHIN-COV-2 AG  Presumptive negative test for SARS-CoV-2 (no antigen detected)     Presumptive negative test for SARS-CoV-2 (no antigen detected)      No results found.    Diagnostic study results (if any) were reviewed by Nat Brian PA-C.    Assessment/Plan   Allergies, medications, history, and pertinent labs/EKGs/Imaging reviewed by Nat Brian PA-C.     Medical Decision Making    Due to the medical service (s) limitations of the Urgent care, the Patient is being recommended for a higher level of medical evaluation in the emergency department. Provider has discussed the reasons for a higher level of evaluation due to the possibility of needing radiology testing (CT head).     This patient has elected to leave against medical advice. In my opinion, the patient has capacity to leave AMA. The patient is clinically sober, free from distracting injury, appears to have intact insight and judgment and reason, and in my opinion has capacity to make decisions. I explained to the patient that his symptoms may represent head injury/ICH and the patient verbalized understanding of my concerns.   I informed the patient that the next step in diagnosis and treatment would be CT brain imaging, and they verbalized understanding of this as well. I explained the risks of leaving without further workup or treatment, which included reasonably foreseeable complications such as death, serious injury, permanent disability, and loss of current lifestyle however not an exhaustive list. I also  offered alternatives to departing AMA such as assigning the patient a different provider or an alternate workup pathway.  The patient is refusing any further care, specifically going to the ED (states does not want to wait, does not feel CT is indicated, fell in Dec and all scans were negative). I have asked the patient to follow up with their primary doctor as soon as possible. I have answered all their questions. Patient signed AMA paperwork. See scanned documents in chart. Pt understands, agrees to plan. Her  is at bedside today as witness.       Orders and Diagnoses  Diagnoses and all orders for this visit:  Fall, initial encounter  Nasal congestion  -     POCT Influenza A/B manually resulted  -     POCT BinaxNOW Covid-19 Ag Card manually resulted  Injury of head, initial encounter  Abrasion of scalp, initial encounter  Acute non-recurrent sinusitis, unspecified location  -     amoxicillin-pot clavulanate (Augmentin) 875-125 mg tablet; Take 1 tablet by mouth 2 times a day for 7 days. Discard additional 6 tablets left in bottle when finished with script  Elevated blood pressure reading  -follow up with pcp as soon as possible. Pt states she took advil which increases her bp in past.     Medical Admin Record      Patient disposition:     Electronically signed by Nat Brian PA-C  7:48 PM

## 2025-02-13 ENCOUNTER — OFFICE VISIT (OUTPATIENT)
Dept: PRIMARY CARE | Facility: CLINIC | Age: 75
End: 2025-02-13
Payer: MEDICARE

## 2025-02-13 ENCOUNTER — APPOINTMENT (OUTPATIENT)
Dept: ORTHOPEDIC SURGERY | Facility: CLINIC | Age: 75
End: 2025-02-13
Payer: MEDICARE

## 2025-02-13 VITALS — SYSTOLIC BLOOD PRESSURE: 116 MMHG | HEART RATE: 84 BPM | DIASTOLIC BLOOD PRESSURE: 80 MMHG

## 2025-02-13 DIAGNOSIS — J01.00 ACUTE NON-RECURRENT MAXILLARY SINUSITIS: ICD-10-CM

## 2025-02-13 DIAGNOSIS — S09.90XA HEAD TRAUMA, INITIAL ENCOUNTER: Primary | ICD-10-CM

## 2025-02-13 DIAGNOSIS — R42 DIZZINESS: ICD-10-CM

## 2025-02-13 DIAGNOSIS — J01.90 ACUTE NON-RECURRENT SINUSITIS, UNSPECIFIED LOCATION: ICD-10-CM

## 2025-02-13 PROCEDURE — 1159F MED LIST DOCD IN RCRD: CPT | Performed by: INTERNAL MEDICINE

## 2025-02-13 PROCEDURE — 99213 OFFICE O/P EST LOW 20 MIN: CPT | Performed by: INTERNAL MEDICINE

## 2025-02-13 PROCEDURE — 1160F RVW MEDS BY RX/DR IN RCRD: CPT | Performed by: INTERNAL MEDICINE

## 2025-02-13 NOTE — PROGRESS NOTES
Subjective   Patient ID: Victorina Velez is a 74 y.o. female who presents for evaluation after closed head trauma    HPI   The patient reports that it 2 or 3:00 in the afternoon 2 days ago, she lost her balance slipped in the kitchen and the back of her head struck the handle of a kitchen cabinet.  She called her brother who noted bleeding from the site.  The patient reports that since the fall she has noted a nodule in the upper occipital region.  She reports constant soreness in the region which increases in intensity with palpation.  She also reports that this morning she woke up and experienced constant vertigo.  She took 1 dose of meclizine and since taking the dose of meclizine she has experienced intermittent episodes of vertigo only with flexion of the cervical spine.  Since February 10, she reports a history of nasal congestion, bilateral facial pressure, frequent popping of the ears, postnasal drip, and a decreased appetite.  She reports no other associated symptoms.  She has not yet begun use of Augmentin but she is using her nasal steroids and saline spray  Review of Systems    Objective   There were no vitals taken for this visit.    Physical Exam     Head-palpation revealed mild tenderness over the maxillary sinuses bilaterally equally but not over the frontal sinuses.  Palpation revealed a tender 2 x 3 cm nodule in the upper occipital region, no increase in warmth  Eyes- extraocular movements intact ;pupils equal and reactive to light; fundi revealed good retinal color, no hemorrhages or exudates  Ears- palpation of pinnas and traguses revealed no tenderness. External auditory canals not erythematous or swollen.  TMs clear.  Nose-turbinates not erythematous or swollen; nasal septal deviation noted to the left  Mouth -no xerostomia noted. Posterior pharynx is not erythematous or swollen. Tonsillar pillars appeared normal no exudates  Neck no lymphadenopathy. Thyroid gland not enlarged. , No  bruits.  Lungs-clear to auscultation bilaterally  Cardiac-rate normal rhythm regular no murmurs no JVD  Abdomen-soft nondistended normal active bowel sounds. Palpation revealed no tenderness or masses    Neurologic  Mental status-alert and oriented x3   Cranial nerves-2 through 12 grossly intact, no visual field abnormalities  Motor-no pronator drift noted, strength-5/5 in all muscle groups tested, except plantar flexors of both ankles 4/5, no tremor noted.  No bradykinesia noted.  No rigidity noted.  Negative pull test  Sensory-Light touch sensation fully intact  Pinprick sensation fully intact  Vibratory sensation not present distal to the right knee, markedly diminished distal to the left knee, diminished in both knees bilaterally right greater than left  Cerebellar-no truncal ataxia, good coordination finger-nose testing,, good coordination heel-to-shin testing, normal rapid alternating movements  Positive Romberg; unable to perform tandem gait  Reflexes-  ankle jerks 0/4 bilaterally, all other reflexes tested 2+/4 bilaterally     [place]lette-Hallpike maneuver negative with rotation of the head bilaterally  Assessment/Plan   Problem List Items Addressed This Visit             ICD-10-CM    Acute non-recurrent maxillary sinusitis J01.00    Dizziness R42    Head trauma, initial encounter - Primary S09.90XA        Assessment  Nasal congestion, bilateral facial pressure, postnasal drip-May be secondary to viral syndrome, sinusitis  Frequent popping of both ears-likely secondary to bilateral eustachian tube dysfunction secondary to viral syndrome, sinusitis  Presence of a small nodule located in the upper occipital region with constant soreness in the region-likely secondary to the presence of a scalp hematoma secondary to closed head trauma.  Intermittent episodes of of vertigo-May be secondary to BPPV secondary to recent head trauma  Anorexia-May be secondary to viral syndrome, sinusitis.  Plan  I told the patient that if  her upper respiratory symptoms are no better or if they are worse tomorrow that she should go ahead and start Augmentin 875 mg p.o. twice daily x 7 days.  I told her that she could use Advil 400-600 mg p.o. every 6 hours as needed pain and that she could use meclizine 12.5 mg p.o. 3 times daily as needed dizziness.  The patient will continue all of her other current medications and she will return for a follow-up visit in 7 days

## 2025-02-16 DIAGNOSIS — Z00.00 HEALTH MAINTENANCE EXAMINATION: ICD-10-CM

## 2025-02-17 RX ORDER — OMEPRAZOLE 20 MG/1
40 CAPSULE, DELAYED RELEASE ORAL DAILY
Qty: 180 CAPSULE | Refills: 1 | Status: SHIPPED | OUTPATIENT
Start: 2025-02-17 | End: 2025-08-16

## 2025-02-18 NOTE — PROGRESS NOTES
Subjective   Patient ID: Victorina Velez is a 74 y.o. female who presents for 1 week follow-up visit    HPI   The patient reports that she started Augmentin on February 17.  Since then, she has experienced no nasal congestion and no bilateral facial pressure.  She reports much less postnasal drip.  Since February 17, she also reports infrequent popping of both ears.  The patient does however report since yesterday passage of multiple soft solid and liquid stools per day she reports that since yesterday she has had a bowel movement after any oral intake..  Over the past week, she reports continued decreased appetite-unchanged.  She reports no associated nausea, vomiting, abdominal pain, melena, hematochezia.  She has been using a probiotic along with her Augmentin    Since yesterday, she reports no soreness in the upper occipital region.  Over the past week, she reports no episodes of vertigo.  No other associated symptoms.  Note that the patient discontinued nasal steroids on February 17 because she experienced irritation in the right nostril.  She is using saline spray.  Review of Systems    Objective   There were no vitals taken for this visit.    Physical Exam    Head-palpation revealed mild tenderness over the maxillary sinuses bilaterally equally but not over the frontal sinuses.  Palpation of the occipital region revealed no tenderness or masses  Ears- palpation of pinnas and traguses revealed no tenderness. External auditory canals not erythematous or swollen.  TMs clear.  Nose-turbinates not erythematous or swollen; nasal septal deviation noted to the left  Mouth -no xerostomia noted. Posterior pharynx is not erythematous or swollen. Tonsillar pillars appeared normal no exudates  Neck no lymphadenopathy. Thyroid gland not enlarged. , No bruits.  Lungs-clear to auscultation bilaterally  Cardiac-rate normal rhythm regular no murmurs no JVD  Abdomen-soft nondistended normal active bowel sounds. Palpation revealed  mild tenderness over the entire abdomen, no rebound tenderness or masses.           Assessment/Plan   Problem List Items Addressed This Visit             ICD-10-CM    Chronic rhinitis J31.0    Postnasal drip R09.82    Acute non-recurrent maxillary sinusitis - Primary J01.00     Other Visit Diagnoses         Codes    Diarrhea due to drug     K52.1              Assessment  History of nasal congestion, bilateral facial pressure, postnasal drip-baby secondary to viral syndrome, sinusitis-clinically improved  Infrequent popping of both ears-likely secondary to bilateral eustachian tube dysfunction secondary to viral syndrome, sinusitis  Diarrhea-likely represents a side effect from administration of Augmentin  Anorexia-May be secondary to viral syndrome, sinusitis.  Plan  I told the patient to decrease her Augmentin dosage to 875 mg p.o. daily and to complete her 20 dose course.  I told the patient that if the diarrhea persists, we would have to discontinue Augmentin and replace the Augmentin with a different antibiotic.  I told the patient to continue use of saline spray 2 sprays to each nostril every 4-6 hours as needed.  I told the patient to push p.o. fluid intake is much as possible.  She will continue all of her other current medications.  She will call me in 1 week with her condition

## 2025-02-20 ENCOUNTER — APPOINTMENT (OUTPATIENT)
Dept: PRIMARY CARE | Facility: CLINIC | Age: 75
End: 2025-02-20
Payer: MEDICARE

## 2025-02-20 VITALS
SYSTOLIC BLOOD PRESSURE: 100 MMHG | HEIGHT: 66 IN | BODY MASS INDEX: 22.82 KG/M2 | DIASTOLIC BLOOD PRESSURE: 70 MMHG | WEIGHT: 142 LBS | TEMPERATURE: 97.3 F | HEART RATE: 96 BPM

## 2025-02-20 DIAGNOSIS — K52.1 DIARRHEA DUE TO DRUG: ICD-10-CM

## 2025-02-20 DIAGNOSIS — R09.82 POSTNASAL DRIP: ICD-10-CM

## 2025-02-20 DIAGNOSIS — J31.0 CHRONIC RHINITIS: ICD-10-CM

## 2025-02-20 DIAGNOSIS — J01.00 ACUTE NON-RECURRENT MAXILLARY SINUSITIS: Primary | ICD-10-CM

## 2025-02-20 PROCEDURE — 1036F TOBACCO NON-USER: CPT | Performed by: INTERNAL MEDICINE

## 2025-02-20 PROCEDURE — 99213 OFFICE O/P EST LOW 20 MIN: CPT | Performed by: INTERNAL MEDICINE

## 2025-03-04 ENCOUNTER — CLINICAL SUPPORT (OUTPATIENT)
Dept: PHYSICAL THERAPY | Facility: CLINIC | Age: 75
End: 2025-03-04
Payer: MEDICARE

## 2025-03-04 DIAGNOSIS — Z91.81 HISTORY OF FALLING: ICD-10-CM

## 2025-03-04 DIAGNOSIS — R42 DIZZINESS: Primary | ICD-10-CM

## 2025-03-04 DIAGNOSIS — R26.81 UNSTEADINESS: ICD-10-CM

## 2025-03-04 DIAGNOSIS — M54.2 NECK PAIN: ICD-10-CM

## 2025-03-04 DIAGNOSIS — M54.2 CERVICALGIA: ICD-10-CM

## 2025-03-04 DIAGNOSIS — R42 VERTIGO: ICD-10-CM

## 2025-03-04 DIAGNOSIS — M43.6 NECK STIFFNESS: ICD-10-CM

## 2025-03-04 PROCEDURE — 97162 PT EVAL MOD COMPLEX 30 MIN: CPT | Mod: GP | Performed by: PHYSICAL THERAPIST

## 2025-03-04 NOTE — PROGRESS NOTES
[facility] Initial Examination Note    Patient Name: Victorina Velez  MRN Number: [id number]  Initial Examination Date: 3/4/2025  Referring Clinician: Kylee Mckeon APRN-*  Reason for Visit: Dizziness    Insurance  Visit Number: 1   Approved Visits:   Certification/ POC Period:  Coverage: Payor: MEDICARE / Plan: MEDICARE PART A AND B / Product Type: *No Product type* /     Precautions/ History  Bilateral hip replacement R 2002/ L 2024; history of lung cancer 2014; breast cancer 2016.     Problem List  Problem List Items Addressed This Visit             ICD-10-CM    Dizziness - Primary R42    Cervicalgia M54.2    Unsteadiness R26.81    History of falling Z91.81     Other Visit Diagnoses         Codes    Vertigo     R42    Neck stiffness     M43.6    Neck pain     M54.2          Subjective  12/19/24 toe caught her toe and fell and hit the left side of her face, all the way down. 3 weeks later she started getting headaches and vertigo. Going to ENT, BPPV treatment did help but it was coming back. Fell again 2/11/24, slid on water on the floor, hit the back of her head on the cabinet handle, seen in urgent care. First fall required stiches in her lip. Dizziness still, not all the time. Putting her head down makes the dizziness more. Balance is way off, since the first fall. No dizziness getting in and out of bed. Gets out of bed on her left side. Headaches periodically since the second fall. Currently having neck pain, no history of neck pain, back of her head/ upper neck. Still workouts out.    Prior level of function: No dizziness.   Patient Goals: Reduce dizziness, work on balance    Pain Scale: Neck  5/10 maximum in the last week    Inspection  Gait: Normal  Walking with vertical head movements: not tested  Walking with horizontal head movements: not  tested      Objective  Other Measures  Other Outcome Measures: DHI = 14; NDI = greater than or equal to 14%    Canalith Testing Left Right Comments    Posterior Canal  none none loaded tomas-hallpike   Horizontal Canal none none not tested     [place] SOP  condition 1: Normal  condition 2: Sway  condition 3: Sway  condition 4: Fall  condition 5: Normal  condition 6: Fall  condition 7: Normal, small turn to the left     Occulomotor Testing  Saccades Vertical: Normal  Saccades Horizontal: Normal  Gaze Center: Normal  Gaze Right: Normal  Gaze Left: Normal  Gaze Upward: Normal  Optokinetic Reflex: Normal  Convergence: Normal    Vestibulo-Ocular Reflex Testing  Head Thrust Test/ Head Impulse Test:   Head Shake Without Fixation:     Assessment  Referred to physical therapy for dizziness, primarily concerned for cervicogenic origins. She also needs balance exercises due to history of falling and postural control deficits during testing.     Problem List: Functional Limitations/ Difficulties, Dizziness Handicap, Dizziness, and Postural Instability/ Fall Risk    Plan  Planned interventions include: Patient education, Home exercise program, Therapeutic exercise , Manual therapy, Neuromuscular re-education, and Spinal Manipulation Spinal manipulation feels great. Treadmill, recumbent bike. Weights at home.     1 / Week for 10 weeks    Treatment      Home Program  To be issues on future sessions.    Goals  - Full return to prior level of function to allow continued working capability.  - Reduce DHI outcome measure goal to less than or equal to 6 for meaningful and clinical improvements in dizziness condition.  - Full resolution of dizziness to improve quality of life.  - Patient to have no falls and negative test on condition 7 within [place] SOP testing to improve postural control and balance.  - Increase cervical AROM greater than or equal to 60° into flexion, 35° into extension, 30° bilateral into side bending and 55° bilateral into rotation without pain for ease of gazing while driving and neck movement during chores.    Plan of care developed in agreement with patient.      Personal Factors Affecting Care: Comorbidities  PT Clinical Presentation: Evolving with changing characteristics  Rehab Potential: Good       Screening  Frequency  Date Last Completed   Spiritual and Cultural Beliefs   Screening  each visit or episode of care    Falls Risk Screening  every ambulatory visit    Pain Screening  annually at primary care visit  1/13/2025   Domestic Violence screening  annually at primary care visit    Elder Abuse Screening  annually at primary care visit    Depression Screening  annually in the primary care setting 2/5/2025   Suicide Risk Screening  annually in the primary care setting 2/19/2024   Nutrition and Food Insecurity   Screening  at least annually at primary care visit  2/19/2024   Key Learner  annually in the primary care setting    Drug Screen  1/13/2025  8:02 AM   Alcohol Screen  1/13/2025  8:02 AM   Advance Directive  5/7/2024          Time Calculation  Start Time: 0403  Stop Time: 0444  Time Calculation (min): 41 min

## 2025-03-06 PROCEDURE — RXMED WILLOW AMBULATORY MEDICATION CHARGE

## 2025-03-07 PROBLEM — R26.81 UNSTEADINESS: Status: ACTIVE | Noted: 2025-03-07

## 2025-03-07 PROBLEM — Z91.81 HISTORY OF FALLING: Status: ACTIVE | Noted: 2025-03-07

## 2025-03-07 PROBLEM — M54.2 CERVICALGIA: Status: ACTIVE | Noted: 2025-03-07

## 2025-03-08 ENCOUNTER — PHARMACY VISIT (OUTPATIENT)
Dept: PHARMACY | Facility: CLINIC | Age: 75
End: 2025-03-08
Payer: COMMERCIAL

## 2025-03-13 ENCOUNTER — HOSPITAL ENCOUNTER (OUTPATIENT)
Dept: RADIOLOGY | Facility: CLINIC | Age: 75
Discharge: HOME | End: 2025-03-13
Payer: MEDICARE

## 2025-03-13 ENCOUNTER — OFFICE VISIT (OUTPATIENT)
Dept: ORTHOPEDIC SURGERY | Facility: CLINIC | Age: 75
End: 2025-03-13
Payer: MEDICARE

## 2025-03-13 DIAGNOSIS — Z47.1 AFTERCARE FOLLOWING LEFT HIP JOINT REPLACEMENT SURGERY: ICD-10-CM

## 2025-03-13 DIAGNOSIS — Z96.642 AFTERCARE FOLLOWING LEFT HIP JOINT REPLACEMENT SURGERY: ICD-10-CM

## 2025-03-13 PROCEDURE — 73502 X-RAY EXAM HIP UNI 2-3 VIEWS: CPT | Mod: LT

## 2025-03-13 PROCEDURE — 99214 OFFICE O/P EST MOD 30 MIN: CPT | Performed by: PHYSICIAN ASSISTANT

## 2025-03-13 PROCEDURE — 1159F MED LIST DOCD IN RCRD: CPT | Performed by: PHYSICIAN ASSISTANT

## 2025-03-13 RX ORDER — NEOMYCIN SULFATE, POLYMYXIN B SULFATE, AND DEXAMETHASONE 3.5; 10000; 1 MG/G; [USP'U]/G; MG/G
OINTMENT OPHTHALMIC
COMMUNITY
Start: 2025-03-04

## 2025-03-13 ASSESSMENT — PAIN - FUNCTIONAL ASSESSMENT: PAIN_FUNCTIONAL_ASSESSMENT: NO/DENIES PAIN

## 2025-03-13 NOTE — PROGRESS NOTES
Carolina Dhaliwal, DIGNA, PACindyC, ATC  Adult Reconstruction and Joint Replacement Surgery  Phone: [phone]     Fax:[phone]            Chief Complaint   Patient presents with    Left Hip - Follow-up     1 Year F/U Left MITRA SR24       HPI:  Victorina Velez is a pleasant 74 y.o. year-old female here for follow-up of their side: left total hip arthroplasty by Dr. John.  The patient is approximately 1 year(s) postop.The patient has no mechanical symptoms.  The patient has no swelling and pain.   The patients wound has healed uneventfully.  The patient has been doing HEP and/or outpatient PT.  No complications postoperatively.  The patient is very happy with the result of the operation.  She fell a couple times this past winter.  She mostly landed on her left side and her face.  She did have some significant bruising on her face and required some stitches.  She has no complaints today    Review of Systems  Past Medical History:   Diagnosis Date    Anxiety     BRCA1 negative Dont remember    BRCA2 negative Don’t remember    Breast cancer (Multi)     Chronic rhinitis 2019    Chronic rhinitis    Dry eyes     Dysphonia 2021    Hoarseness    Dyspnea, unspecified 2016    Acute dyspnea    Familial hypercholesterolemia 2018    Essential familial hypercholesterolemia    GERD (gastroesophageal reflux disease)     Hx antineoplastic chemo     HX: breast cancer 2016    s/p partial mastectomy w/ radiation and tamoxifen (ductal and lobular carcinoma in situ)    Hypothyroidism     Lobular carcinoma in situ     Osteoporosis     Pericarditis (HHS-HCC)     Personal history of irradiation     Personal history of other endocrine, nutritional and metabolic disease 2020    History of hypothyroidism    Personal history of pneumonia (recurrent) 2014    History of pneumonia    Pleurodynia 2015    Chest pain, pleuritic    Primary lung cancer (Multi)     s/p  concurrent chemoRT, RLL lobectomy and mediastinal node dissection, adjuvent chemo in 7067-3452    Rosacea     Urinary tract infection      Patient Active Problem List   Diagnosis    Anxiety    Arthralgia of hip    Carcinoma in situ of left breast    Chronic obstructive pulmonary disease (Multi)    Chronic rhinitis    Decreased sense of smell    HA (dyspnea on exertion)    Dyslipidemia    Esophageal reflux    Dysphagia    Hyperglycemia    Lung cancer (Multi)    Nasal dryness    Adenocarcinoma of lung (Multi)    Age-related nuclear cataract of left eye    Age-related nuclear cataract of right eye    Deviated nasal septum    Dry eyes    Osteopenia    Primary insomnia    Type I RTA    Dry eye syndrome of both lacrimal glands    Facial pressure    Fullness in ear, bilateral    Postnasal drip    Vaginal atrophy    Combined forms of age-related cataract of left eye    Combined forms of age-related cataract of right eye    Malignant neoplasm of upper lobe of right lung (Multi)    Hypothyroidism (acquired)    Abdominal pain, LLQ (left lower quadrant)    Actinic keratosis    Acute allergic conjunctivitis of both eyes    Acute non-recurrent maxillary sinusitis    Blepharitis of both eyes    Closed fracture of olecranon process of ulna    Conjunctivochalasis of both eyes    Other seborrheic keratosis    Skin changes due to chronic exposure to nonionizing radiation, unspecified    Contracture, left elbow    Dermatitis, unspecified    Diarrhea of presumed infectious origin    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    Dysuria    Epidermal cyst    Facial pain    Furuncle of extremity    Rash and other nonspecific skin eruption    H/O: lung cancer    Hemangioma of skin and subcutaneous tissue    Impacted cerumen of both ears    Inflammatory polyarthropathy (Multi)    Loss of height    Lung nodule, solitary    Melanocytic nevi of right upper limb, including shoulder    Melanocytic nevi of scalp and neck    Melanocytic nevi  of trunk    Melanocytic nevi of other parts of face    Myopia of both eyes    Myopia with presbyopia    Nasal congestion    Neoplasm of uncertain behavior of skin    Open fracture of head of radius    Other specified disorders of thyroid    Pain in left elbow    Pain of toe of left foot    PCO (posterior capsular opacification), bilateral    Pericarditis, acute (HHS-HCC)    Pleural effusion    Primary osteoarthritis of first carpometacarpal joint of right hand    Primary osteoarthritis of left hip    Pseudophakia of both eyes    Dyshidrosis (pompholyx)    Pustules determined by examination    PVD (posterior vitreous detachment), right eye    Rosacea, unspecified    Stiffness of left elbow joint    T3 low in serum    Postmenopausal atrophic vaginitis    Postmenopausal osteoporosis    Unilateral primary osteoarthritis, left hip    Status post left hip replacement    Dizziness    Dehydration    Head trauma, initial encounter    Cervicalgia    Unsteadiness    History of falling     Medication Documentation Review Audit       Reviewed by Corrie Dsouza LPN (Licensed Nurse) on 03/13/25 at 0828      Medication Order Taking? Sig Documenting Provider Last Dose Status   alirocumab (Praluent Pen) 75 mg/mL pen injector [id number] Yes Inject 75 mg under the skin every 14 (fourteen) days. Raghavendra Chávez MD  Active   biotin 5 mg tablet [id number] Yes Take by mouth. Historical Provider, MD  Active   calcium carbonate (CALCIUM 500 ORAL) [id number] Yes Take 1 capsule by mouth 2 times a week. Historical Provider, MD  Active   cholecalciferol (Vitamin D-3) 125 MCG (5000 UT) capsule [id number] Yes Take 1 tablet by mouth once daily. Historical Provider, MD  Active   estradiol (Estrace) 0.01 % (0.1 mg/gram) vaginal cream [id number] Yes Insert 0.125 Applicatorfuls (0.5 g) into the vagina 3 times a week. Rani Hua MD  Active   magnesium oxide (Mag-Ox) 400 mg tablet [id number] Yes Take 1 tablet (400 mg) by mouth once daily. Historical  Provider, MD  Active   meclizine (Antivert) 12.5 mg tablet [id number] Yes Take 1 tablet (12.5 mg) by mouth 3 times a day as needed for dizziness. Raghavendra Chávez MD  Active   multivitamin tablet [id number] Yes Take 1 tablet by mouth 1 time. Historical Provider, MD  Active   neomycin-polymyxin B-dexameth (Polydex) 3.5 mg/g-10,000 unit/g-0.1 % ointment ophthalmic ointment [id number] Yes APPLY TO BOTH UPPER EYELIDS TWICE A DAY AS DIRECTED FOR 10 DAYS Historical Provider, MD  Active   omeprazole (PriLOSEC) 20 mg DR capsule [id number] Yes TAKE 2 CAPSULES (40 MG) BY MOUTH ONCE DAILY. DO NOT CRUSH OR CHEW. Raghavendra Chávez MD  Active   THYROID ORAL [id number] Yes Take 1 Capful by mouth once daily. Plant based for T3-T4- holistic pharmacy Historical Provider, MD  Active                  No Known Allergies  Social History     Socioeconomic History    Marital status: Single     Spouse name: Not on file    Number of children: Not on file    Years of education: Not on file    Highest education level: Not on file   Occupational History    Not on file   Tobacco Use    Smoking status: Former     Types: Cigarettes    Smokeless tobacco: Never    Tobacco comments:     Smoked during her teenage years. None since   Vaping Use    Vaping status: Never Used   Substance and Sexual Activity    Alcohol use: Yes     Alcohol/week: 1.0 standard drink of alcohol     Types: 1 Glasses of wine per week    Drug use: Never    Sexual activity: Not Currently     Birth control/protection: Post-menopausal, None   Other Topics Concern    Not on file   Social History Narrative    Not on file     Social Drivers of Health     Financial Resource Strain: Low Risk  (2/19/2024)    Overall Financial Resource Strain (CARDIA)     Difficulty of Paying Living Expenses: Not hard at all   Food Insecurity: No Food Insecurity (2/19/2024)    Hunger Vital Sign     Worried About Running Out of Food in the Last Year: Never true     Ran Out of Food in the Last Year: Never true    Transportation Needs: No Transportation Needs (3/5/2024)    OASIS : Transportation     Lack of Transportation (Medical): No     Lack of Transportation (Non-Medical): No     Patient Unable or Declines to Respond: No   Physical Activity: Insufficiently Active (2/19/2024)    Exercise Vital Sign     Days of Exercise per Week: 7 days     Minutes of Exercise per Session: 20 min   Stress: No Stress Concern Present (2/19/2024)    [employer] [place] [employer] - Occupational Stress Questionnaire     Feeling of Stress : Not at all   Social Connections: Feeling Socially Integrated (3/5/2024)    OASIS : Social Isolation     Frequency of experiencing loneliness or isolation: Never   Recent Concern: Social Connections - Moderately Isolated (2/19/2024)    Social Connection and Isolation Panel [NHANES]     Frequency of Communication with Friends and Family: More than three times a week     Frequency of Social Gatherings with Friends and Family: More than three times a week     Attends Taoist Services: Never     Active Member of Clubs or Organizations: Yes     Attends Club or Organization Meetings: 1 to 4 times per year     Marital Status:    Intimate Partner Violence: Not At Risk (2/19/2024)    Humiliation, Afraid, Rape, and Kick questionnaire     Fear of Current or Ex-Partner: No     Emotionally Abused: No     Physically Abused: No     Sexually Abused: No   Housing Stability: Low Risk  (2/19/2024)    Housing Stability Vital Sign     Unable to Pay for Housing in the Last Year: No     Number of Places Lived in the Last Year: 1     Unstable Housing in the Last Year: No     Past Surgical History:   Procedure Laterality Date    BREAST LUMPECTOMY Left 09/09/2016    Postive for Ductal and lobular carcinoma in situ    BRONCHOSCOPY  09/24/2014    w/ mediastinoscopy (Positive for primary lung cancer w/ mets to LN)    CATARACT EXTRACTION      CATARACT EXTRACTION, BILATERAL Bilateral 2022    ELBOW FRACTURE  SURGERY Left 06/12/2015    ORIF    HIP SURGERY Right 02/21/2017    Hip Surgery- 2022    JOINT REPLACEMENT      LUNG LOBECTOMY Right 12/18/2014    RLL lobectomy and mediastinal LN dissection    LUNG SURGERY Right 04/23/2015    VATS for pleural effusion s/p right lower lobectomy w/ chest tube    MASTECTOMY, PARTIAL Left 09/26/2016    Positve margins on prior lumpectomy    TOTAL HIP ARTHROPLASTY Left 02/19/2024       Physical Exam  side: left Hip  There were no vitals filed for this visit.  AxO x 3 in NAD, appears stated age. Cooperative.   Assistive Device: no device. Coordination and balance intact.  Skin inact over bilateral upper and lower extremities, no erythema, ecchymosis, temperature changes. No popliteal lymphadenopathy,  No other overlying lesion  mood: euthymic  Respirations non labored  Surgical hip demonstrates pain free ROM with mild tenderness to palpation over greater trochanter laterally.  The incision is midline healed with no signs of surrounding infection, dehiscence or drainage.   Neurovascularly back to baseline  5/5 hip flexion/knee extension/DF/PF/EHL  SILT in chirag/saph/ per/tib distribution   Extremities warm and well perfused.  No lower extremity calf tenderness or swelling  2+ Femoral/DP/PT pulses bilaterally    Imaging:    I personally reviewed multiple views of the hip today in clinic.  Status post side: left Total Hip Arthroplasty. The implant is well fixed, well aligned.  No evidence of franc-implant fracture, lucency or dislocation. Leg lengths closely restored.    Impression/Plan:  Victorina Velez is doing well post-operatively and happy with the results of the operation.     S/P side: left Total hip Arthroplasty  I talked with patient at length about activity precautions and progression of activities. The patient understands their permanent precautions.   3. Discussed the importance of dental prophylactic dental antibiotics lifelong. Patient may request medication refill through [facility],        Pharmacy or surgeons office.    Her x-rays look great.  She did not do anything serious to her hip.  We did discuss continuation with her home exercise program and taking any NSAID for aches and pains.  All questions answered.    Follow-up 5 years with x-rays at next visit.    DIGNA Cheney, PA-C, ATC  Orthopedic Physician Assisant  Adult Reconstruction and Total Joint Replacement  General Orthopedics  Department of Orthopaedic Surgery  [place] [phone]  [employer] (formerly known as [employer]) messaging preferred

## 2025-03-18 ENCOUNTER — TREATMENT (OUTPATIENT)
Dept: PHYSICAL THERAPY | Facility: CLINIC | Age: 75
End: 2025-03-18
Payer: MEDICARE

## 2025-03-18 DIAGNOSIS — R26.81 UNSTEADINESS: ICD-10-CM

## 2025-03-18 DIAGNOSIS — M54.2 CERVICALGIA: Primary | ICD-10-CM

## 2025-03-18 DIAGNOSIS — R42 DIZZINESS: ICD-10-CM

## 2025-03-18 DIAGNOSIS — Z91.81 HISTORY OF FALLING: ICD-10-CM

## 2025-03-18 PROCEDURE — 97112 NEUROMUSCULAR REEDUCATION: CPT | Mod: GP | Performed by: PHYSICAL THERAPIST

## 2025-03-18 PROCEDURE — 97140 MANUAL THERAPY 1/> REGIONS: CPT | Mod: GP | Performed by: PHYSICAL THERAPIST

## 2025-03-18 NOTE — PROGRESS NOTES
Vestibular Physical Therapy Initial Examination Note    Patient Name: Victorina Velez  MRN Number: [id number]  Initial Examination Date: 3/18/2025  Referring Clinician: No ref. provider found  Reason for Visit: Dizziness    Insurance  Visit Number: 2   Approved Visits: Medical necessity.  Certification/ POC Period: 3/4/25 - 5/13/25  Coverage: Payor: MEDICARE / Plan: MEDICARE PART A AND B / Product Type: *No Product type* /     Precautions/ History  Bilateral hip replacement R 2002/ L 2024; history of lung cancer 2014; breast cancer 2016.     Problem List  Problem List Items Addressed This Visit             ICD-10-CM    Dizziness R42    Cervicalgia - Primary M54.2    Unsteadiness R26.81    History of falling Z91.81     Subjective  No significant changes since last seen in PT.    Manual Therapy   - HVLA, CT junction   - soft tissue mobilization, bilateral upper trapezius    Therapeutic Exercise   - upper trapezius stretch    Neuromuscular Reeducation   - standing corner balance with eyes closed   - corner balance feet together with eyes open   - single leg balance   - HEP printed/ issued/ discussed     Assessment  Improvements in neck mobility immediately upon spinal manipulation.    Problem List: Functional Limitations/ Difficulties, Dizziness Handicap, Dizziness, and Postural Instability/ Fall Risk    Plan  Planned interventions include: Patient education, Home exercise program, Therapeutic exercise , Manual therapy, Neuromuscular re-education, and Spinal Manipulation Spinal manipulation feels great. Treadmill, recumbent bike. Weights at home.     1 / Week for 10 weeks    Home Program  Access Code: [email or web address]  URL: [email or web address]  Date: 03/18/2025  Prepared by: Judd Crouch    Exercises  - Seated Upper Trapezius Stretch  - 1-2 x daily - 7 x weekly - 3 reps - 20 sec hold  - Standing Balance in Corner with Eyes Closed (chair in front)  - 1-2 x daily - 7 x weekly - 2 sets - 1-2 min hold  -  Corner Balance Feet Together With Eyes Open (chair in front)  - 1-2 x daily - 7 x weekly - 2 reps - 1-2 min hold  - Single Leg Stance with Support  - 1-2 x daily - 7 x weekly - 2 reps - 1-2 min hold    Goals  - Full return to prior level of function to allow continued working capability.  - Reduce DHI outcome measure goal to less than or equal to 6 for meaningful and clinical improvements in dizziness condition.  - Full resolution of dizziness to improve quality of life.  - Patient to have no falls and negative test on condition 7 within DARRON SOP testing to improve postural control and balance.  - Increase cervical AROM greater than or equal to 60° into flexion, 35° into extension, 30° bilateral into side bending and 55° bilateral into rotation without pain for ease of gazing while driving and neck movement during chores.    Plan of care developed in agreement with patient.     Personal Factors Affecting Care: Comorbidities  PT Clinical Presentation: Evolving with changing characteristics  Rehab Potential: Good       Screening  Frequency  Date Last Completed   Spiritual and Cultural Beliefs   Screening  each visit or episode of care    Falls Risk Screening  every ambulatory visit    Pain Screening  annually at primary care visit  1/13/2025   Domestic Violence screening  annually at primary care visit    Elder Abuse Screening  annually at primary care visit    Depression Screening  annually in the primary care setting 2/5/2025   Suicide Risk Screening  annually in the primary care setting 2/19/2024   Nutrition and Food Insecurity   Screening  at least annually at primary care visit  2/19/2024   Key Learner  annually in the primary care setting    Drug Screen  1/13/2025  8:02 AM   Alcohol Screen  1/13/2025  8:02 AM   Advance Directive  5/7/2024          Time Calculation  Start Time: 1116  Stop Time: 1201  Time Calculation (min): 45 min     PT Therapeutic Procedures Time Entry  Manual Therapy Time Entry:  18  Neuromuscular Re-Education Time Entry: 16  Therapeutic Exercise Time Entry: 5

## 2025-04-01 ENCOUNTER — TREATMENT (OUTPATIENT)
Dept: PHYSICAL THERAPY | Facility: CLINIC | Age: 75
End: 2025-04-01
Payer: MEDICARE

## 2025-04-01 DIAGNOSIS — R26.81 UNSTEADINESS: ICD-10-CM

## 2025-04-01 DIAGNOSIS — M54.2 CERVICALGIA: Primary | ICD-10-CM

## 2025-04-01 DIAGNOSIS — R42 DIZZINESS: ICD-10-CM

## 2025-04-01 DIAGNOSIS — Z91.81 HISTORY OF FALLING: ICD-10-CM

## 2025-04-01 PROCEDURE — 97110 THERAPEUTIC EXERCISES: CPT | Mod: GP | Performed by: PHYSICAL THERAPIST

## 2025-04-01 PROCEDURE — 97140 MANUAL THERAPY 1/> REGIONS: CPT | Mod: GP | Performed by: PHYSICAL THERAPIST

## 2025-04-01 NOTE — PROGRESS NOTES
Vestibular Physical Therapy Initial Examination Note    Patient Name: Victorina Velez  MRN Number: [id number]  Initial Examination Date: 4/1/2025  Referring Clinician: No ref. provider found  Reason for Visit: Dizziness    Insurance  Visit Number: 3   Approved Visits: Medical necessity.  Certification/ POC Period: 3/4/25 - 5/13/25  Coverage: Payor: MEDICARE / Plan: MEDICARE PART A AND B / Product Type: *No Product type* /     Precautions/ History  Bilateral hip replacement R 2002/ L 2024; history of lung cancer 2014; breast cancer 2016.     Problem List  Problem List Items Addressed This Visit             ICD-10-CM    Dizziness R42    Cervicalgia - Primary M54.2    Unsteadiness R26.81    History of falling Z91.81     Subjective  Went to the chiropractor a week ago Thursday, stayed away from her neck, did work on her low back. Usually sees the chiropractor about 1 times a month.     Manual Therapy   - HVLA, CT junction, no cavitation   - HLVA, mid thoracic, supine, no cavitation   - soft tissue mobilization, bilateral upper trapezius    Therapeutic Exercise   - cervical rotation    Neuromuscular Reeducation   - standing corner balance with eyes closed   - corner balance feet together with eyes open   - HEP printed/ issued/ discussed     Assessment  Reduction in headache and improved subjective neck mobility following manual care and stretching.    Problem List: Functional Limitations/ Difficulties, Dizziness Handicap, Dizziness, and Postural Instability/ Fall Risk    Plan  Practice working on getting to floor and crunches.    Planned interventions include: Patient education, Home exercise program, Therapeutic exercise , Manual therapy, Neuromuscular re-education, and Spinal Manipulation Spinal manipulation feels great. Treadmill, recumbent bike. Weights at home. Has 3-10 pound dumbbells at home.    1 / Week for 10 weeks    Home Program  Access Code: [email or web address]  URL: [email or web address]  Date:  03/18/2025  Prepared by: Judd Crouch    Exercises  - Seated Upper Trapezius Stretch  - 1-2 x daily - 7 x weekly - 3 reps - 20 sec hold  - Standing Balance in Corner with Eyes Closed (chair in front)  - 1-2 x daily - 7 x weekly - 2 sets - 1-2 min hold  - Corner Balance Feet Together With Eyes Open (chair in front)  - 1-2 x daily - 7 x weekly - 2 reps - 1-2 min hold  - Single Leg Stance with Support  - 1-2 x daily - 7 x weekly - 2 reps - 1-2 min hold    Goals  - Full return to prior level of function to allow continued working capability.  - Reduce DHI outcome measure goal to less than or equal to 6 for meaningful and clinical improvements in dizziness condition.  - Full resolution of dizziness to improve quality of life.  - Patient to have no falls and negative test on condition 7 within DARRON SOP testing to improve postural control and balance.  - Increase cervical AROM greater than or equal to 60° into flexion, 35° into extension, 30° bilateral into side bending and 55° bilateral into rotation without pain for ease of gazing while driving and neck movement during chores.    Plan of care developed in agreement with patient.     Personal Factors Affecting Care: Comorbidities  PT Clinical Presentation: Evolving with changing characteristics  Rehab Potential: Good       Screening  Frequency  Date Last Completed   Spiritual and Cultural Beliefs   Screening  each visit or episode of care    Falls Risk Screening  every ambulatory visit    Pain Screening  annually at primary care visit  1/13/2025   Domestic Violence screening  annually at primary care visit    Elder Abuse Screening  annually at primary care visit    Depression Screening  annually in the primary care setting 2/5/2025   Suicide Risk Screening  annually in the primary care setting 2/19/2024   Nutrition and Food Insecurity   Screening  at least annually at primary care visit  2/19/2024   Key Learner  annually in the primary care setting    Drug Screen   1/13/2025  8:02 AM   Alcohol Screen  1/13/2025  8:02 AM   Advance Directive  5/7/2024          Time Calculation  Start Time: 0816  Stop Time: 0901  Time Calculation (min): 45 min     PT Therapeutic Procedures Time Entry  Manual Therapy Time Entry: 18  Therapeutic Exercise Time Entry: 22

## 2025-04-03 DIAGNOSIS — E78.5 DYSLIPIDEMIA: ICD-10-CM

## 2025-04-03 PROCEDURE — RXMED WILLOW AMBULATORY MEDICATION CHARGE

## 2025-04-03 RX ORDER — ALIROCUMAB 75 MG/ML
75 INJECTION, SOLUTION SUBCUTANEOUS
Qty: 2 ML | Refills: 2 | Status: SHIPPED | OUTPATIENT
Start: 2025-04-03

## 2025-04-05 ENCOUNTER — PHARMACY VISIT (OUTPATIENT)
Dept: PHARMACY | Facility: CLINIC | Age: 75
End: 2025-04-05
Payer: COMMERCIAL

## 2025-04-09 ENCOUNTER — TREATMENT (OUTPATIENT)
Dept: PHYSICAL THERAPY | Facility: CLINIC | Age: 75
End: 2025-04-09
Payer: MEDICARE

## 2025-04-09 DIAGNOSIS — M54.2 CERVICALGIA: ICD-10-CM

## 2025-04-09 DIAGNOSIS — R26.81 UNSTEADINESS: ICD-10-CM

## 2025-04-09 DIAGNOSIS — Z91.81 HISTORY OF FALLING: ICD-10-CM

## 2025-04-09 DIAGNOSIS — R42 DIZZINESS: Primary | ICD-10-CM

## 2025-04-09 PROCEDURE — 97140 MANUAL THERAPY 1/> REGIONS: CPT | Mod: GP | Performed by: PHYSICAL THERAPIST

## 2025-04-09 PROCEDURE — 97110 THERAPEUTIC EXERCISES: CPT | Mod: GP | Performed by: PHYSICAL THERAPIST

## 2025-04-09 NOTE — PROGRESS NOTES
Vestibular Physical Therapy Initial Examination Note    Patient Name: Victorina Velez  MRN Number: [id number]  Initial Examination Date: 4/9/2025  Referring Clinician: No ref. provider found  Reason for Visit: Dizziness    Insurance  Visit Number: 4   Approved Visits: Medical necessity.  Certification/ POC Period: 3/4/25 - 5/13/25  Coverage: Payor: MEDICARE / Plan: MEDICARE PART A AND B / Product Type: *No Product type* /     Precautions/ History  Bilateral hip replacement R 2002/ L 2024; history of lung cancer 2014; breast cancer 2016.     Problem List  Problem List Items Addressed This Visit             ICD-10-CM    Dizziness - Primary R42    Cervicalgia M54.2    Unsteadiness R26.81    History of falling Z91.81     Subjective  Doing better and better. Since last seen in PT she has not had any pain on the side of her head. Neck pain has been better too and she is having more and more mobility in her neck.     Manual Therapy   - HVLA, CT junction, cavitation bilaterally   - HLVA, mid thoracic, supine, no cavitation   - CPA, CT junction   - soft tissue mobilization, bilateral upper trapezius    Therapeutic Exercise   - wall slides flexion   - HEP printed/ issued/ discussed     Assessment  Reduction in headache and improved subjective neck mobility following manual care and stretching.    Problem List: Functional Limitations/ Difficulties, Dizziness Handicap, Dizziness, and Postural Instability/ Fall Risk    Plan  Practice working on getting to floor and crunches.    Planned interventions include: Patient education, Home exercise program, Therapeutic exercise , Manual therapy, Neuromuscular re-education, and Spinal Manipulation Spinal manipulation feels great. Treadmill, recumbent bike. Weights at home. Has 3-10 pound dumbbells at home.    1 / Week for 10 weeks    Home Program  Access Code: [email or web address]  URL: [email or web address]  Date: 03/18/2025  Prepared by: Judd Crouch    Exercises  - Seated Upper  Trapezius Stretch  - 1-2 x daily - 7 x weekly - 3 reps - 20 sec hold  - Standing Balance in Corner with Eyes Closed (chair in front)  - 1-2 x daily - 7 x weekly - 2 sets - 1-2 min hold  - Corner Balance Feet Together With Eyes Open (chair in front)  - 1-2 x daily - 7 x weekly - 2 reps - 1-2 min hold  - Single Leg Stance with Support  - 1-2 x daily - 7 x weekly - 2 reps - 1-2 min hold    Goals  - Full return to prior level of function to allow continued working capability.  - Reduce DHI outcome measure goal to less than or equal to 6 for meaningful and clinical improvements in dizziness condition.  - Full resolution of dizziness to improve quality of life.  - Patient to have no falls and negative test on condition 7 within DARRON SOP testing to improve postural control and balance.  - Increase cervical AROM greater than or equal to 60° into flexion, 35° into extension, 30° bilateral into side bending and 55° bilateral into rotation without pain for ease of gazing while driving and neck movement during chores.    Plan of care developed in agreement with patient.     Personal Factors Affecting Care: Comorbidities  PT Clinical Presentation: Evolving with changing characteristics  Rehab Potential: Good       Screening  Frequency  Date Last Completed   Spiritual and Cultural Beliefs   Screening  each visit or episode of care    Falls Risk Screening  every ambulatory visit    Pain Screening  annually at primary care visit  1/13/2025   Domestic Violence screening  annually at primary care visit    Elder Abuse Screening  annually at primary care visit    Depression Screening  annually in the primary care setting 2/5/2025   Suicide Risk Screening  annually in the primary care setting 2/19/2024   Nutrition and Food Insecurity   Screening  at least annually at primary care visit  2/19/2024   Key Learner  annually in the primary care setting    Drug Screen  1/13/2025  8:02 AM   Alcohol Screen  1/13/2025  8:02 AM   Advance  Directive  5/7/2024          Time Calculation  Start Time: 0830  Stop Time: 0916  Time Calculation (min): 46 min     PT Therapeutic Procedures Time Entry  Manual Therapy Time Entry: 30  Therapeutic Exercise Time Entry: 11

## 2025-04-15 ENCOUNTER — TREATMENT (OUTPATIENT)
Dept: PHYSICAL THERAPY | Facility: CLINIC | Age: 75
End: 2025-04-15
Payer: MEDICARE

## 2025-04-15 DIAGNOSIS — R26.81 UNSTEADINESS: ICD-10-CM

## 2025-04-15 DIAGNOSIS — M54.2 CERVICALGIA: ICD-10-CM

## 2025-04-15 DIAGNOSIS — Z91.81 HISTORY OF FALLING: Primary | ICD-10-CM

## 2025-04-15 PROCEDURE — 97140 MANUAL THERAPY 1/> REGIONS: CPT | Mod: GP | Performed by: PHYSICAL THERAPIST

## 2025-04-15 NOTE — PROGRESS NOTES
Vestibular Physical Therapy Initial Examination Note    Patient Name: Victorina Velez  MRN Number: [id number]  Initial Examination Date: 4/15/2025  Referring Clinician: No ref. provider found  Reason for Visit: Dizziness    Insurance  Visit Number: 5   Approved Visits: Medical necessity.  Certification/ POC Period: 3/4/25 - 5/13/25  Coverage: Payor: MEDICARE / Plan: MEDICARE PART A AND B / Product Type: *No Product type* /     Precautions/ History  Bilateral hip replacement R 2002/ L 2024; history of lung cancer 2014; breast cancer 2016.     Problem List  Problem List Items Addressed This Visit             ICD-10-CM    Cervicalgia M54.2    Unsteadiness R26.81    History of falling - Primary Z91.81       Subjective  Neck is doing much better. Sees chiropractor one time a month, started working on her neck in the last week. Neck has been feeling much better. She has not been having any dizziness. Has not had a a headache in the last couple weeks.     Manual Therapy   - HVLA, CT junction, no cavitation   - HLVA, mid thoracic, supine, no cavitation   - CPA, CT junction   - soft tissue mobilization, left posterior cuff    Therapeutic Exercise   - elbow extension stretch    Assessment  Stretching continues to significantly reduce pain, eliminate dizziness and headaches. Trigger point in left mid scapular area and posterior cuff reduces with manual care.    Problem List: Functional Limitations/ Difficulties, Dizziness Handicap, Dizziness, and Postural Instability/ Fall Risk    Plan  Practice working on getting to floor and crunches.    Planned interventions include: Patient education, Home exercise program, Therapeutic exercise , Manual therapy, Neuromuscular re-education, and Spinal Manipulation Spinal manipulation feels great. Treadmill, recumbent bike. Weights at home. Has 3-10 pound dumbbells at home.    1 / Week for 10 weeks    Home Program  Access Code: [email or web address]  URL: [email or web address]  Date:  03/18/2025  Prepared by: Judd Crouch    Exercises  - Seated Upper Trapezius Stretch  - 1-2 x daily - 7 x weekly - 3 reps - 20 sec hold  - Standing Balance in Corner with Eyes Closed (chair in front)  - 1-2 x daily - 7 x weekly - 2 sets - 1-2 min hold  - Corner Balance Feet Together With Eyes Open (chair in front)  - 1-2 x daily - 7 x weekly - 2 reps - 1-2 min hold  - Single Leg Stance with Support  - 1-2 x daily - 7 x weekly - 2 reps - 1-2 min hold    Goals  - Full return to prior level of function to allow continued working capability.  - Reduce DHI outcome measure goal to less than or equal to 6 for meaningful and clinical improvements in dizziness condition.  - Full resolution of dizziness to improve quality of life.  - Patient to have no falls and negative test on condition 7 within DARRON SOP testing to improve postural control and balance.  - Increase cervical AROM greater than or equal to 60° into flexion, 35° into extension, 30° bilateral into side bending and 55° bilateral into rotation without pain for ease of gazing while driving and neck movement during chores.    Plan of care developed in agreement with patient.     Personal Factors Affecting Care: Comorbidities  PT Clinical Presentation: Evolving with changing characteristics  Rehab Potential: Good       Screening  Frequency  Date Last Completed   Spiritual and Cultural Beliefs   Screening  each visit or episode of care    Falls Risk Screening  every ambulatory visit    Pain Screening  annually at primary care visit  1/13/2025   Domestic Violence screening  annually at primary care visit    Elder Abuse Screening  annually at primary care visit    Depression Screening  annually in the primary care setting 2/5/2025   Suicide Risk Screening  annually in the primary care setting 2/19/2024   Nutrition and Food Insecurity   Screening  at least annually at primary care visit  2/19/2024   Key Learner  annually in the primary care setting    Drug Screen   1/13/2025  8:02 AM   Alcohol Screen  1/13/2025  8:02 AM   Advance Directive  5/7/2024          Time Calculation  Start Time: 0903  Stop Time: 0938  Time Calculation (min): 35 min     PT Therapeutic Procedures Time Entry  Manual Therapy Time Entry: 30  Therapeutic Exercise Time Entry: 2

## 2025-04-22 ENCOUNTER — TREATMENT (OUTPATIENT)
Dept: PHYSICAL THERAPY | Facility: CLINIC | Age: 75
End: 2025-04-22
Payer: MEDICARE

## 2025-04-22 DIAGNOSIS — Z91.81 HISTORY OF FALLING: Primary | ICD-10-CM

## 2025-04-22 DIAGNOSIS — M54.2 CERVICALGIA: ICD-10-CM

## 2025-04-22 DIAGNOSIS — R42 DIZZINESS: ICD-10-CM

## 2025-04-22 DIAGNOSIS — R26.81 UNSTEADINESS: ICD-10-CM

## 2025-04-22 PROCEDURE — 97140 MANUAL THERAPY 1/> REGIONS: CPT | Mod: GP | Performed by: PHYSICAL THERAPIST

## 2025-04-22 PROCEDURE — 97110 THERAPEUTIC EXERCISES: CPT | Mod: GP | Performed by: PHYSICAL THERAPIST

## 2025-04-22 NOTE — PROGRESS NOTES
Vestibular Physical Therapy Initial Examination Note    Patient Name: Victorina Velez  MRN Number: [id number]  Initial Examination Date: 4/22/2025  Referring Clinician: No ref. provider found  Reason for Visit: Dizziness    Insurance  Visit Number: 6   Approved Visits: Medical necessity.  Certification/ POC Period: 3/4/25 - 5/13/25  Coverage: Payor: MEDICARE / Plan: MEDICARE PART A AND B / Product Type: *No Product type* /     Precautions/ History  Bilateral hip replacement R 2002/ L 2024; history of lung cancer 2014; breast cancer 2016.     Problem List  Problem List Items Addressed This Visit           ICD-10-CM    Dizziness R42    Cervicalgia M54.2    Unsteadiness R26.81    History of falling - Primary Z91.81     Subjective  No question that therapy is helping her neck pain, dizziness, headache. Having some ear pressure today, not sure what that is about. Off of advil, was taking it a lot before.    Manual Therapy   - HVLA, CT junction, no cavitation   - CPA, CT junction   - soft tissue mobilization, left posterior cuff    Therapeutic Exercise   - elbow extension stretch   - wall slides, 10 sec hold   - horizontal abduction x10 sec hold, yellow   - snags   - HEP printed/ issued/ discussed     Assessment  Elimination of ear pressure following manual care. Neck pain, well controlled.     Problem List: Functional Limitations/ Difficulties, Dizziness Handicap, Dizziness, and Postural Instability/ Fall Risk    Plan  Plan for supine cervical manipulations.     Planned interventions include: Patient education, Home exercise program, Therapeutic exercise , Manual therapy, Neuromuscular re-education, and Spinal Manipulation Spinal manipulation feels great. Treadmill, recumbent bike. Weights at home. Has 3-10 pound dumbbells at home.    1 / Week for 10 weeks    Home Program  Access Code: [email or web address]  URL: [email or web address]  Date: 03/18/2025  Prepared by: Judd Crouch    Exercises  - Seated Upper  Trapezius Stretch  - 1-2 x daily - 7 x weekly - 3 reps - 20 sec hold  - Standing Balance in Corner with Eyes Closed (chair in front)  - 1-2 x daily - 7 x weekly - 2 sets - 1-2 min hold  - Corner Balance Feet Together With Eyes Open (chair in front)  - 1-2 x daily - 7 x weekly - 2 reps - 1-2 min hold  - Single Leg Stance with Support  - 1-2 x daily - 7 x weekly - 2 reps - 1-2 min hold  -------------------------------------------------------------------------------------------------------------------------  Access Code: [email or web address]  URL: [email or web address]  Date: 04/22/2025  Prepared by: Judd Crouch    Exercises  - Seated Upper Trapezius Stretch  - 1-2 x daily - 7 x weekly - 3 reps - 20 sec hold  - Seated Assisted Cervical Rotation with Towel  - 1-2 x daily - 7 x weekly - 3 reps - 20 se hold  - Standing Balance in Corner with Eyes Closed (chair in front)  - 1-2 x daily - 7 x weekly - 2 sets - 1-2 min hold  - Corner Balance Feet Together With Eyes Open (chair in front)  - 1-2 x daily - 7 x weekly - 2 reps - 1-2 min hold  - Single Leg Stance with Support  - 1-2 x daily - 7 x weekly - 2 reps - 1-2 min hold  - Standing Shoulder Flexion Wall Slides  - 1-2 x daily - 7 x weekly - 10 reps - 10 sec hold  - Standing Shoulder Horizontal Abduction with Resistance  - 3 x weekly - 2 sets - 10-20 reps  -------------------------------------------------------------------------------------------------------------------------  Access Code: [email or web address]  URL: [email or web address]  Date: 04/22/2025  Prepared by: Judd Crouch    Exercises  - Seated Upper Trapezius Stretch  - 1-2 x daily - 7 x weekly - 3 reps - 20 sec hold  - Seated Assisted Cervical Rotation with Towel  - 1-2 x daily - 7 x weekly - 3 reps - 20 se hold  - Standing Balance in Corner with Eyes Closed (chair in front)  - 1-2 x daily - 7 x weekly - 2 sets - 1-2 min hold  - Corner Balance Feet Together With Eyes Open (chair in front)   - 1-2 x daily - 7 x weekly - 2 reps - 1-2 min hold  - Single Leg Stance with Support  - 1-2 x daily - 7 x weekly - 2 reps - 1-2 min hold  - Standing Shoulder Flexion Wall Slides  - 1-2 x daily - 7 x weekly - 10 reps - 10 sec hold  - Cervical Extension AROM with Strap  - 1-2 x daily - 7 x weekly - 10 reps - 10 sec hold  - Standing Shoulder Horizontal Abduction with Resistance  - 3 x weekly - 2 sets - 10-20 reps    Goals  - Full return to prior level of function to allow continued working capability.  - Reduce DHI outcome measure goal to less than or equal to 6 for meaningful and clinical improvements in dizziness condition.  - Full resolution of dizziness to improve quality of life.  - Patient to have no falls and negative test on condition 7 within DARRON SOP testing to improve postural control and balance.  - Increase cervical AROM greater than or equal to 60° into flexion, 35° into extension, 30° bilateral into side bending and 55° bilateral into rotation without pain for ease of gazing while driving and neck movement during chores.    Plan of care developed in agreement with patient.     Personal Factors Affecting Care: Comorbidities  PT Clinical Presentation: Evolving with changing characteristics  Rehab Potential: Good       Screening  Frequency  Date Last Completed   Spiritual and Cultural Beliefs   Screening  each visit or episode of care    Falls Risk Screening  every ambulatory visit    Pain Screening  annually at primary care visit  1/13/2025   Domestic Violence screening  annually at primary care visit    Elder Abuse Screening  annually at primary care visit    Depression Screening  annually in the primary care setting 2/5/2025   Suicide Risk Screening  annually in the primary care setting 2/19/2024   Nutrition and Food Insecurity   Screening  at least annually at primary care visit  2/19/2024   Key Learner  annually in the primary care setting    Drug Screen  1/13/2025  8:02 AM   Alcohol Screen  1/13/2025   8:02 AM   Advance Directive  5/7/2024          Time Calculation  Start Time: 0902  Stop Time: 0947  Time Calculation (min): 45 min     PT Therapeutic Procedures Time Entry  Manual Therapy Time Entry: 30  Therapeutic Exercise Time Entry: 10

## 2025-04-26 ENCOUNTER — OFFICE VISIT (OUTPATIENT)
Dept: URGENT CARE | Age: 75
End: 2025-04-26
Payer: MEDICARE

## 2025-04-26 ENCOUNTER — APPOINTMENT (OUTPATIENT)
Dept: URGENT CARE | Age: 75
End: 2025-04-26
Payer: MEDICARE

## 2025-04-26 VITALS
WEIGHT: 145 LBS | OXYGEN SATURATION: 99 % | HEART RATE: 95 BPM | SYSTOLIC BLOOD PRESSURE: 148 MMHG | TEMPERATURE: 98.6 F | RESPIRATION RATE: 20 BRPM | HEIGHT: 66 IN | BODY MASS INDEX: 23.3 KG/M2 | DIASTOLIC BLOOD PRESSURE: 93 MMHG

## 2025-04-26 DIAGNOSIS — H92.02 EAR PAIN, LEFT: Primary | ICD-10-CM

## 2025-04-26 DIAGNOSIS — H65.92 MIDDLE EAR EFFUSION, LEFT: ICD-10-CM

## 2025-04-26 PROCEDURE — RXMED WILLOW AMBULATORY MEDICATION CHARGE

## 2025-04-26 NOTE — PATIENT INSTRUCTIONS
Fluid in the Ear ED    General Information  You came to the Emergency Department (ED) for fluid in the ear. The medical name for this is serous otitis media. This means you have fluid build-up in the middle part of your ear. The fluid can be caused by allergies, a common cold, or cigarette smoke. This fluid build-up is not caused by a bacterial infection.  The fluid in your ear can last for a few weeks. This can cause a mild, short-term hearing loss. If the fluid lasts for more than a few months, your doctor may suggest treatment to drain the fluid or look for other causes for the fluid.  What care is needed at home?  Call your regular doctor to let them know you were in the ED. Make a follow-up appointment if you were told to.  Do not put anything in your ear unless it was ordered by the doctor.  If you have pain, you may want to take medicines like ibuprofen, naproxen, or acetaminophen.  When do I need to call the doctor?  Your symptoms are not getting better within 5 days.  You still have hearing problems after 2 weeks.  You have a fever of 100.4°F (38°C) or higher.  You have drainage coming from your ear.  You have new or worsening pain or swelling in or around your ear, ringing or buzzing in your ear, or you have new hearing problems.  You have new or worsening symptoms.

## 2025-04-26 NOTE — PROGRESS NOTES
"Subjective   Patient ID: Victorina Velez is a 74 y.o. female. They present today with a chief complaint of Earache (Bilat earache.  Started 3 days ago.  Pain upon loud noises.  No dizziness. No hx of wax buildup).    History of Present Illness    Earache       74-year-old female here for ear pain.  Started with ear complaint 3 days ago.  Left worse than right.  She has pain with loud noises.  She started with left-sided earache this morning.  States she does have seasonal allergies and has been sneezing little bit.  Has not been taking antihistamine because it dries her eyes out.  Does use nasal sprays as recommended by her ENT.  Denies fevers, chills, or history of cerumen impaction.  She is concerned for bacterial infection because she does have prosthetic joints.  Past Medical History  Allergies as of 04/26/2025 - Reviewed 04/26/2025   Allergen Reaction Noted    Amoxicillin-pot clavulanate Other 04/26/2025       Prescriptions Prior to Admission[1]     Medical History[2]    Surgical History[3]     reports that she has quit smoking. Her smoking use included cigarettes. She has never used smokeless tobacco. She reports current alcohol use of about 1.0 standard drink of alcohol per week. She reports that she does not use drugs.    Review of Systems  Review of Systems   HENT:  Positive for ear pain.    All other systems reviewed and are negative.                                 Objective    Vitals:    04/26/25 0832   BP: (!) 148/93   Pulse: 95   Resp: 20   Temp: 37 °C (98.6 °F)   SpO2: 99%   Weight: 65.8 kg (145 lb)   Height: 1.676 m (5' 6\")     No LMP recorded. Patient is postmenopausal.    Physical Exam  Physical Exam:    General: Vitals noted no distress. Afebrile. Normal phonation, no stridor, no trismus    ENT: Small fluid behind the left TM without erythema or bulging.  Right TM is unremarkable. Left external auditory canal is unremarkable. Right external auditory canal is unremarkable.  Patient has no pain with " palpation of bilateral tragus or manipulation of the ear helix.  Mastoids nontender. Normal conjunctival. Eyes are PERRLA. Posterior oropharynx without exudate or swelling. Uvula is in the midline and nonedematous.    Neck: Supple. No meningismus through full range of motion. No anterior cervical lymphadenopathy. No posterior cervical chain lymphadenopathy    Cardiac: Regular rate rhythm    Lungs: Good aeration throughout. No adventitious breath sounds.    Extremities: No peripheral edema    Skin: No rash    Neuro: No gross neurological deficits      Procedures    Point of Care Test & Imaging Results from this visit  No results found for this visit on 04/26/25.   Imaging  No results found.    Cardiology, Vascular, and Other Imaging  No other imaging results found for the past 2 days      Diagnostic study results (if any) were reviewed by Ze Cedillo PA-C.    Assessment/Plan   Allergies, medications, history, and pertinent labs/EKGs/Imaging reviewed by Ze Cedillo PA-C.     Medical Decision Making  MDM:      Summary: This is a 74-year-old female here for ear pain. Vital signs were reviewed. Patient is well-appearing nontoxic on exam. Differential diagnosis includes but not limited to otitis media, otitis externa, cerumen impaction, eustachian tube dysfunction, allergic rhinitis.  There is no evidence of otitis media or otitis externa.  States she has been sneezing.  Advised intake histamine for what sounds like seasonal allergies.  She does have fluid behind the left TM.  Continue nasal sprays as prescribed by ENT.  She does have an appoint with her PCP in 1 week encouraged to keep this.  May use Tylenol for pain if needed.  Stable for discharge. Follow-up with PCP. Return to urgent care or go to the emergency department if symptoms worsen or if new symptoms develop.    Orders and Diagnoses  Diagnoses and all orders for this visit:  Ear pain, left  Middle ear effusion, left      Medical Admin Record      Patient  disposition: Home    Electronically signed by Ze Cedillo PA-C  8:51 AM           [1] (Not in a hospital admission)  [2]   Past Medical History:  Diagnosis Date    Anxiety     BRCA1 negative Dont remember    BRCA2 negative Don’t remember    Breast cancer     Chronic rhinitis 04/24/2019    Chronic rhinitis    Dry eyes     Dysphonia 08/26/2021    Hoarseness    Dyspnea, unspecified 02/09/2016    Acute dyspnea    Familial hypercholesterolemia 04/24/2018    Essential familial hypercholesterolemia    GERD (gastroesophageal reflux disease)     Hx antineoplastic chemo 2014    HX: breast cancer 09/2016    s/p partial mastectomy w/ radiation and tamoxifen (ductal and lobular carcinoma in situ)    Hypothyroidism     Lobular carcinoma in situ 2016    Osteoporosis     Pericarditis ([facility]-HCC)     Personal history of irradiation     Personal history of other endocrine, nutritional and metabolic disease 04/27/2020    History of hypothyroidism    Personal history of pneumonia (recurrent) 07/28/2014    History of pneumonia    Pleurodynia 04/17/2015    Chest pain, pleuritic    Primary lung cancer (Multi) 2014    s/p concurrent chemoRT, RLL lobectomy and mediastinal node dissection, adjuvent chemo in [id number]    Rosacea     Urinary tract infection    [3]   Past Surgical History:  Procedure Laterality Date    BREAST LUMPECTOMY Left 09/09/2016    Postive for Ductal and lobular carcinoma in situ    BRONCHOSCOPY  09/24/2014    w/ mediastinoscopy (Positive for primary lung cancer w/ mets to LN)    CATARACT EXTRACTION      CATARACT EXTRACTION, BILATERAL Bilateral 2022    ELBOW FRACTURE SURGERY Left 06/12/2015    ORIF    HIP SURGERY Right 02/21/2017    Hip Surgery- 2022    JOINT REPLACEMENT      LUNG LOBECTOMY Right 12/18/2014    RLL lobectomy and mediastinal LN dissection    LUNG SURGERY Right 04/23/2015    VATS for pleural effusion s/p right lower lobectomy w/ chest tube    MASTECTOMY, PARTIAL Left 09/26/2016    Positve margins on prior  lumpectomy    TOTAL HIP ARTHROPLASTY Left 02/19/2024

## 2025-04-26 NOTE — PROGRESS NOTES
Subjective   Patient ID: Victorina Velez is a 74 y.o. female who presents for ear pain    HPI     Review of Systems    Objective   There were no vitals taken for this visit.    Physical Exam    Assessment/Plan   {Assess/PlanSmartLinks:92622}        etiology.  Intermittent involuntary spasms in the left antecubital fossa-unsure of etiology.  Plan  Obtain x-rays of the left elbow today.  I have told the patient to begin use of phenylephrine 10 mg p.o. every 6 hours as needed.  She will continue use of Zyrtec Flonase AYR etc.  I recommended that she begin use of Advil 400-600 mg p.o. every 6 hours as needed pain.  The patient will probably require an ENT referral and an orthopedic surgery referral   yes

## 2025-04-28 ENCOUNTER — HOSPITAL ENCOUNTER (OUTPATIENT)
Dept: RADIOLOGY | Facility: CLINIC | Age: 75
Discharge: HOME | End: 2025-04-28
Payer: MEDICARE

## 2025-04-28 ENCOUNTER — APPOINTMENT (OUTPATIENT)
Dept: PRIMARY CARE | Facility: CLINIC | Age: 75
End: 2025-04-28
Payer: MEDICARE

## 2025-04-28 ENCOUNTER — PHARMACY VISIT (OUTPATIENT)
Dept: PHARMACY | Facility: CLINIC | Age: 75
End: 2025-04-28
Payer: COMMERCIAL

## 2025-04-28 VITALS
BODY MASS INDEX: 23.14 KG/M2 | TEMPERATURE: 96.8 F | HEIGHT: 66 IN | SYSTOLIC BLOOD PRESSURE: 120 MMHG | HEART RATE: 66 BPM | DIASTOLIC BLOOD PRESSURE: 86 MMHG | WEIGHT: 144 LBS

## 2025-04-28 DIAGNOSIS — M25.522 LEFT ELBOW PAIN: ICD-10-CM

## 2025-04-28 DIAGNOSIS — H92.02 LEFT EAR PAIN: ICD-10-CM

## 2025-04-28 DIAGNOSIS — M25.522 LEFT ELBOW PAIN: Primary | ICD-10-CM

## 2025-04-28 PROCEDURE — 73080 X-RAY EXAM OF ELBOW: CPT | Mod: LT

## 2025-04-28 PROCEDURE — 1159F MED LIST DOCD IN RCRD: CPT | Performed by: INTERNAL MEDICINE

## 2025-04-28 PROCEDURE — 99212 OFFICE O/P EST SF 10 MIN: CPT | Performed by: INTERNAL MEDICINE

## 2025-04-28 PROCEDURE — 1160F RVW MEDS BY RX/DR IN RCRD: CPT | Performed by: INTERNAL MEDICINE

## 2025-04-28 PROCEDURE — 73080 X-RAY EXAM OF ELBOW: CPT | Mod: LEFT SIDE | Performed by: RADIOLOGY

## 2025-04-29 ENCOUNTER — TREATMENT (OUTPATIENT)
Dept: PHYSICAL THERAPY | Facility: CLINIC | Age: 75
End: 2025-04-29
Payer: MEDICARE

## 2025-04-29 DIAGNOSIS — M43.6 NECK STIFFNESS: ICD-10-CM

## 2025-04-29 DIAGNOSIS — M54.2 NECK PAIN: ICD-10-CM

## 2025-04-29 DIAGNOSIS — M54.2 CERVICALGIA: Primary | ICD-10-CM

## 2025-04-29 DIAGNOSIS — M25.522 LEFT ELBOW PAIN: Primary | ICD-10-CM

## 2025-04-29 DIAGNOSIS — R42 DIZZINESS: ICD-10-CM

## 2025-04-29 DIAGNOSIS — R26.81 UNSTEADINESS: ICD-10-CM

## 2025-04-29 DIAGNOSIS — R42 VERTIGO: ICD-10-CM

## 2025-04-29 DIAGNOSIS — Z91.81 HISTORY OF FALLING: ICD-10-CM

## 2025-04-29 PROCEDURE — 97140 MANUAL THERAPY 1/> REGIONS: CPT | Mod: GP | Performed by: PHYSICAL THERAPIST

## 2025-04-29 PROCEDURE — 97110 THERAPEUTIC EXERCISES: CPT | Mod: GP | Performed by: PHYSICAL THERAPIST

## 2025-04-29 NOTE — PROGRESS NOTES
Vestibular Physical Therapy Initial Examination Note    Patient Name: Victorina Velez  MRN Number: [id number]  Initial Examination Date: 4/29/2025  Referring Clinician: No ref. provider found  Reason for Visit: Dizziness    Insurance  Visit Number: 7   Approved Visits: Medical necessity.  Certification/ POC Period: 3/4/25 - 5/13/25  Coverage: Payor: MEDICARE / Plan: MEDICARE PART A AND B / Product Type: *No Product type* /     Precautions/ History  Bilateral hip replacement R 2002/ L 2024; history of lung cancer 2014; breast cancer 2016.     Problem List  Problem List Items Addressed This Visit           ICD-10-CM    Cervicalgia - Primary M54.2    Unsteadiness R26.81    History of falling Z91.81       Subjective  Not sure what she did to her neck, having more soreness in the left side of her neck. Might have to have hardware removed from her elbow.     Manual Therapy   - HVLA C1-2, cavitation with rotation right, non with rotation left   - soft tissue mobilization, left cervical paraspinals, sub-occipital   - soft tissue mobilization, left posterior cuff   - CT junction CPA    Therapeutic Exercise   - horizontal abduction x10 sec hold, yellow   - HEP printed/ issued/ discussed     Assessment  Improvements in cervical motion pre to post manual care.    Problem List: Functional Limitations/ Difficulties, Dizziness Handicap, Dizziness, and Postural Instability/ Fall Risk    Plan  Plan for supine cervical manipulations.     Planned interventions include: Patient education, Home exercise program, Therapeutic exercise , Manual therapy, Neuromuscular re-education, and Spinal Manipulation Spinal manipulation feels great. Treadmill, recumbent bike. Weights at home. Has 3-10 pound dumbbells at home.    1 / Week for 10 weeks    Home Program  Access Code: [email or web address]  URL: [email or web address]  Date: 03/18/2025  Prepared by: Judd Crouch    Exercises  - Seated Upper Trapezius Stretch  - 1-2 x daily - 7 x weekly  - 3 reps - 20 sec hold  - Standing Balance in Corner with Eyes Closed (chair in front)  - 1-2 x daily - 7 x weekly - 2 sets - 1-2 min hold  - Corner Balance Feet Together With Eyes Open (chair in front)  - 1-2 x daily - 7 x weekly - 2 reps - 1-2 min hold  - Single Leg Stance with Support  - 1-2 x daily - 7 x weekly - 2 reps - 1-2 min hold  -------------------------------------------------------------------------------------------------------------------------  Access Code: [email or web address]  URL: [email or web address]  Date: 04/22/2025  Prepared by: Judd Crouch    Exercises  - Seated Upper Trapezius Stretch  - 1-2 x daily - 7 x weekly - 3 reps - 20 sec hold  - Seated Assisted Cervical Rotation with Towel  - 1-2 x daily - 7 x weekly - 3 reps - 20 se hold  - Standing Balance in Corner with Eyes Closed (chair in front)  - 1-2 x daily - 7 x weekly - 2 sets - 1-2 min hold  - Corner Balance Feet Together With Eyes Open (chair in front)  - 1-2 x daily - 7 x weekly - 2 reps - 1-2 min hold  - Single Leg Stance with Support  - 1-2 x daily - 7 x weekly - 2 reps - 1-2 min hold  - Standing Shoulder Flexion Wall Slides  - 1-2 x daily - 7 x weekly - 10 reps - 10 sec hold  - Standing Shoulder Horizontal Abduction with Resistance  - 3 x weekly - 2 sets - 10-20 reps  -------------------------------------------------------------------------------------------------------------------------  Access Code: [email or web address]  URL: [email or web address]  Date: 04/22/2025  Prepared by: Judd Crouch    Exercises  - Seated Upper Trapezius Stretch  - 1-2 x daily - 7 x weekly - 3 reps - 20 sec hold  - Seated Assisted Cervical Rotation with Towel  - 1-2 x daily - 7 x weekly - 3 reps - 20 se hold  - Standing Balance in Corner with Eyes Closed (chair in front)  - 1-2 x daily - 7 x weekly - 2 sets - 1-2 min hold  - Corner Balance Feet Together With Eyes Open (chair in front)  - 1-2 x daily - 7 x weekly - 2 reps - 1-2  min hold  - Single Leg Stance with Support  - 1-2 x daily - 7 x weekly - 2 reps - 1-2 min hold  - Standing Shoulder Flexion Wall Slides  - 1-2 x daily - 7 x weekly - 10 reps - 10 sec hold  - Cervical Extension AROM with Strap  - 1-2 x daily - 7 x weekly - 10 reps - 10 sec hold  - Standing Shoulder Horizontal Abduction with Resistance  - 3 x weekly - 2 sets - 10-20 reps    Goals  - Full return to prior level of function to allow continued working capability.  - Reduce DHI outcome measure goal to less than or equal to 6 for meaningful and clinical improvements in dizziness condition.  - Full resolution of dizziness to improve quality of life.  - Patient to have no falls and negative test on condition 7 within DARRON SOP testing to improve postural control and balance.  - Increase cervical AROM greater than or equal to 60° into flexion, 35° into extension, 30° bilateral into side bending and 55° bilateral into rotation without pain for ease of gazing while driving and neck movement during chores.    Plan of care developed in agreement with patient.     Personal Factors Affecting Care: Comorbidities  PT Clinical Presentation: Evolving with changing characteristics  Rehab Potential: Good       Screening  Frequency  Date Last Completed   Spiritual and Cultural Beliefs   Screening  each visit or episode of care    Falls Risk Screening  every ambulatory visit    Pain Screening  annually at primary care visit  1/13/2025   Domestic Violence screening  annually at primary care visit    Elder Abuse Screening  annually at primary care visit    Depression Screening  annually in the primary care setting 2/5/2025   Suicide Risk Screening  annually in the primary care setting 2/19/2024   Nutrition and Food Insecurity   Screening  at least annually at primary care visit  2/19/2024   Key Learner  annually in the primary care setting    Drug Screen  4/26/2025  8:34 AM   Alcohol Screen  4/26/2025  8:34 AM   Advance Directive  5/7/2024           Time Calculation  Start Time: 0905  Stop Time: 0948  Time Calculation (min): 43 min     PT Therapeutic Procedures Time Entry  Manual Therapy Time Entry: 32  Therapeutic Exercise Time Entry: 8

## 2025-05-07 NOTE — PROGRESS NOTES
Subjective   Reason for Visit: Victorina Velez is an 74 y.o. female here for a Medicare Wellness visit.               HPI  The patient reports a history of constant throat congestion x 1 week.  She reports continued chronic constant thickness and irritation in the lower throat times years-increased over the past week.  Over the past week, she reports experiencing intermittent episodes of dull pain and itching in the left ear.  She still reports a history of a constant blocked sensation and frequent popping in both ears times approximately 17 days.  Over the past year, the patient reports continued chronic nasal congestion and chronic rhinorrhea-unchanged over the past 17 days.  Over the past year, the patient reports continued chronic cough productive of clear sputum-unchanged over the past 17 days.  Over the past year or so, the patient reports no dyspnea on exertion, as she has been exercising more and working with a ..  No other associated symptoms.    Over the past week, the patient reports continued intermittent episodes of dull pain located just proximal to the antecubital fossa.  She again reports that the episodes of been precipitated by activity.  For the past week, she reports frequent popping in the region.  She still reports continued intermittent involuntary spasms located just proximal to the antecubital fossa-unchanged.  No other associated symptoms.    Over the past year, the patient reports continued chronic intermittent episodes of pain-unable to describe--stiffness of the CMC joint of the right thumb.  She again reports that the episodes can occur with movement.  She reports no associated symptoms.  Over the past year or more she reports a decrease in the frequency and intensity of the episodes.    Since her cancer diagnosis several years ago, the patient reports diffuse alopecia which she feels has decreased with her use of woman's Rogaine.  No other new complaints.  Patient Care Team:  Raghavendra  RIGOBERTO Chávez MD as PCP - General (Internal Medicine)  Raghavendra Chávez MD as PCP - [facility] Attributed Provider     Review of Systems  All systems have been reviewed and are normal except as previously noted  Objective   Vitals:  There were no vitals taken for this visit.      Physical Exam  Head-palpation revealed no tenderness over the maxillary or frontal sinuses.  Eyes- extraocular movements intact ;pupils equal and reactive to light; fundi revealed good retinal color, no hemorrhages or exudates  Ears- palpation of pinnas and traguses revealed no tenderness. External auditory canals not erythematous or swollen.  TMs clear.  Nose-turbinates not erythematous or swollen; mild nasal septal deviation noted to the left  Mouth -no xerostomia noted. Posterior pharynx is not erythematous or swollen. Tonsillar pillars appeared normal no exudates  Neck no lymphadenopathy. Thyroid gland not enlarged. , No bruits.  Lungs-clear to auscultation bilaterally  Cardiac-rate normal rhythm regular no murmurs no JVD  Abdomen-soft nondistended normal active bowel sounds. Palpation revealed no tenderness or masses  Pulses 2+ bilaterally in the upper extremities, 0 bilaterally in the lower extremities    Extremities-no peripheral edema  Musculoskeletal  Left elbow-no erythema or swelling. Decreased range of motion with soreness noted in the antecubital fossa on extension 150 degrees, full range of motion with no soreness in all other directions of motion.  Palpation revealed no tenderness or increase in warmth.  CMC joint right thumb-no erythema or swelling.  Full range of motion in all directions of motion with no pain.  Palpation revealed no tenderness or increase in warmth  Lumbar spine-no erythema or swelling. Full range of motion with pain noted on rotation and bending bilaterally.  Full range of motion with no pain noted in all other directions of motion.  Palpation revealed no tenderness or increase in warmth     Skin-yellowish  discoloration of the toenails of both first toes noted bilaterally.  Yellowish discoloration also noted left fifth toe.  No erythema or eruptions noted.     Neurologic  Mental status-alert and oriented x3   Cranial nerves-2 through 12 grossly intact, no visual field abnormalities  Motor-no pronator drift noted, strength-5/5 in all muscle groups tested, except plantar flexors of both ankles 4/5, no tremor noted.  No bradykinesia noted.  No rigidity noted.  Negative pull test  Sensory-Light touch sensation fully intact  Pinprick sensation slightly diminished in the fifth toes bilaterally equally  Vibratory sensation not present distal to the right knee, markedly diminished distal to the left knee, diminished in both knees bilaterally equally.  Cerebellar-no truncal ataxia, good coordination finger-nose testing,, good coordination heel-to-shin testing, normal rapid alternating movements  Positive Romberg; moderately decreased coordination in tandem gait  Reflexes-  ankle jerks 1+4 bilaterally, all other reflexes tested 2+/4 bilaterally  Assessment & Plan  Routine general medical examination at a health care facility    Orders:  •  CBC and Auto Differential; Future  •  Comprehensive Metabolic Panel; Future  •  C-Reactive Protein, High Sensitivity; Future  •  Hepatitis C Antibody; Future  •  Lipid Panel; Future  •  Thyroid Stimulating Hormone; Future  •  Vitamin B12; Future  •  Vitamin D 25-Hydroxy,Total (for eval of Vitamin D levels); Future    Chronic rhinitis    Orders:  •  CBC and Auto Differential; Future  •  Comprehensive Metabolic Panel; Future  •  C-Reactive Protein, High Sensitivity; Future  •  Hepatitis C Antibody; Future  •  Lipid Panel; Future  •  Thyroid Stimulating Hormone; Future  •  Vitamin B12; Future  •  Vitamin D 25-Hydroxy,Total (for eval of Vitamin D levels); Future    Acquired hypothyroidism    Orders:  •  CBC and Auto Differential; Future  •  Comprehensive Metabolic Panel; Future  •  C-Reactive  Protein, High Sensitivity; Future  •  Hepatitis C Antibody; Future  •  Lipid Panel; Future  •  Thyroid Stimulating Hormone; Future  •  Vitamin B12; Future  •  Vitamin D 25-Hydroxy,Total (for eval of Vitamin D levels); Future    Chronic obstructive pulmonary disease, unspecified COPD type (Multi)    Orders:  •  CBC and Auto Differential; Future  •  Comprehensive Metabolic Panel; Future  •  C-Reactive Protein, High Sensitivity; Future  •  Hepatitis C Antibody; Future  •  Lipid Panel; Future  •  Thyroid Stimulating Hormone; Future  •  Vitamin B12; Future  •  Vitamin D 25-Hydroxy,Total (for eval of Vitamin D levels); Future    Vitamin D deficiency    Orders:  •  CBC and Auto Differential; Future  •  Comprehensive Metabolic Panel; Future  •  C-Reactive Protein, High Sensitivity; Future  •  Hepatitis C Antibody; Future  •  Lipid Panel; Future  •  Thyroid Stimulating Hormone; Future  •  Vitamin B12; Future  •  Vitamin D 25-Hydroxy,Total (for eval of Vitamin D levels); Future    Postnasal drip    Orders:  •  CBC and Auto Differential; Future  •  Comprehensive Metabolic Panel; Future  •  C-Reactive Protein, High Sensitivity; Future  •  Hepatitis C Antibody; Future  •  Lipid Panel; Future  •  Thyroid Stimulating Hormone; Future  •  Vitamin B12; Future  •  Vitamin D 25-Hydroxy,Total (for eval of Vitamin D levels); Future    Left ear pain    Orders:  •  CBC and Auto Differential; Future  •  Comprehensive Metabolic Panel; Future  •  C-Reactive Protein, High Sensitivity; Future  •  Hepatitis C Antibody; Future  •  Lipid Panel; Future  •  Thyroid Stimulating Hormone; Future  •  Vitamin B12; Future  •  Vitamin D 25-Hydroxy,Total (for eval of Vitamin D levels); Future    Left elbow pain    Orders:  •  CBC and Auto Differential; Future  •  Comprehensive Metabolic Panel; Future  •  C-Reactive Protein, High Sensitivity; Future  •  Hepatitis C Antibody; Future  •  Lipid Panel; Future  •  Thyroid Stimulating Hormone; Future  •  Vitamin  B12; Future  •  Vitamin D 25-Hydroxy,Total (for eval of Vitamin D levels); Future    Encounter for screening for cardiovascular disorders    Orders:  •  C-Reactive Protein, High Sensitivity; Future            Assessment  Noncardiac chest pain    Pericardial effusion-probably malignant in nature August 2014.    Obstructive/restrictive lung disease.  Adenocarcinoma of the right lung status post right lower lobe lobectomy December 18, 2014-mediastinal lymph node dissection status post chemoradiation.  Bronchiectasis right perihilar region  Right pleural effusion-chronic  Radiation pneumonitis August 2015  COVID-19 December 2021  Chronic nasal congestion and rhinorrhea-probably secondary to structural abnormality, allergic rhinitis, nonallergic rhinitis  Deviated nasal septum to the left  Continued intermittent episodes of dull pain, itching left ear-May be secondary to eustachian tube dysfunction, serous otitis media.   Constant blocked sensation in both ears--May be secondary to bilateral eustachian tube dysfunction secondary to viral syndrome, sinusitis  Chronic recently frequent  popping in both ears-May be secondary to bilateral eustachian tube dysfunction secondary to viral syndrome, sinusitis  Constant throat congestion,-May be secondary to viral syndrome-sinusitis  Chronic constant thickness and irritation located in the lower throat-unsure of etiology. 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 secondary overactive bladder  Chronic intermittent episodes of urinary incontinence only occurring when the patient waits too long? 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  Continued intermittent involuntary spasms proximal to antecubital fossa-unsure of etiology  Fracture of the left ulna and left radius-status post repair June 2015  Paraneoplastic syndrome.  Osteoarthritis of the left hip status post left total hip replacement February 19, 2024  Right hip greater trochanteric bursitis.  Status post right total hip replacement 2002.  Distal metatarsal fractures right fourth and fifth toes January 2014.  Prediabetes  Osteoporosis  Hypothyroidism  Hyperlipidemia  Laceration upper lip December 19, 2024 status post fall  Long history of alopecia-May be secondary to telogen effluvium  Acne rosacea  Chronic presence of a cystic lesion on the right elbow-likely represents a epidermal cyst  Ruptured epidermal cyst right elbow June 2020  Chronic intermittent itching noted in the medial corners of both eyes-may be secondary to dry eye syndrome  Bilateral cataracts status post removal of cataract right eye April 27, 2022; status post removal of cataract left eye May 6, 2022  Bilateral posterior vitreous detachments  Dry eye syndrome...  Chronic worsening memory for names-may be secondary to age-related cognitive decline     Chronic intermittent episodes of itching located in the back of the head-unsure of etiology.  May be secondary to occipital neuralgia  Chronic intermittent episodes of  itching located over the upper and mid back regions-unsure of etiology.  Chronic constant numbness noted underneath the third, fourth and fifth toes of both feet-unsure of etiology. May be secondary to peripheral neuropathy.  Chronic intermittent episodes of numbness in the fingers of both hands only occurring when the patient raises her arms when sleeping-may be secondary to bilateral compression neuropathy possibly bilateral ulnar neuropathy  Multilevel cervical spondylosis  Scoliosis thoracic spine..  Chronic intermittent episodes of aching pain in the left lower back region radiating to the lateral surface of the left mid calf region-May be secondary to a left L4-L5 radiculopathy secondary to central canal stenosis lumbar spine, left-sided neuroforaminal stenosis lumbar spine  Central canal stenosis of the lumbar spine.  Bilateral neuroforaminal stenosis of the lumbar spine.  Chronic insomnia manifested as difficulty staying asleep-probably secondary to primary insomnia, anxiety.  Anxiety    Plan  Obtain urinalysis today  Obtain CBC differential, CMP, fasting lipid profile, TSH, vitamin D level, vitamin B12 level, cardiac CRP as soon as possible  Refer patient to ENT for further evaluation and treatment.  I referred the patient to orthopedic upper extremity specialist because of the continued intermittent episodes of dull pain located just proximal to the antecubital fossa  I recommended that the patient apply Voltaren gel or Biofreeze to painful regions as directed.  I also told the patient that she should use Advil 400-600 mg p.o. every 6 hours as needed pain.  The patient will continue all of her other current medications for now pending the results of lab work.  She will return for an annual Medicare wellness visit in 1 year

## 2025-05-07 NOTE — ASSESSMENT & PLAN NOTE
Orders:    CBC and Auto Differential; Future    Comprehensive Metabolic Panel; Future    C-Reactive Protein, High Sensitivity; Future    Hepatitis C Antibody; Future    Lipid Panel; Future    Thyroid Stimulating Hormone; Future    Vitamin B12; Future    Vitamin D 25-Hydroxy,Total (for eval of Vitamin D levels); Future

## 2025-05-08 ENCOUNTER — APPOINTMENT (OUTPATIENT)
Dept: PRIMARY CARE | Facility: CLINIC | Age: 75
End: 2025-05-08
Payer: MEDICARE

## 2025-05-08 VITALS
BODY MASS INDEX: 23.56 KG/M2 | WEIGHT: 146.6 LBS | TEMPERATURE: 96.8 F | HEIGHT: 66 IN | HEART RATE: 68 BPM | DIASTOLIC BLOOD PRESSURE: 80 MMHG | SYSTOLIC BLOOD PRESSURE: 110 MMHG

## 2025-05-08 DIAGNOSIS — R09.82 POSTNASAL DRIP: ICD-10-CM

## 2025-05-08 DIAGNOSIS — Z00.00 ROUTINE GENERAL MEDICAL EXAMINATION AT A HEALTH CARE FACILITY: Primary | ICD-10-CM

## 2025-05-08 DIAGNOSIS — E03.9 ACQUIRED HYPOTHYROIDISM: ICD-10-CM

## 2025-05-08 DIAGNOSIS — H92.02 LEFT EAR PAIN: ICD-10-CM

## 2025-05-08 DIAGNOSIS — J31.0 CHRONIC RHINITIS: ICD-10-CM

## 2025-05-08 DIAGNOSIS — J44.9 CHRONIC OBSTRUCTIVE PULMONARY DISEASE, UNSPECIFIED COPD TYPE (MULTI): ICD-10-CM

## 2025-05-08 DIAGNOSIS — Z13.6 ENCOUNTER FOR SCREENING FOR CARDIOVASCULAR DISORDERS: ICD-10-CM

## 2025-05-08 DIAGNOSIS — E55.9 VITAMIN D DEFICIENCY: ICD-10-CM

## 2025-05-08 DIAGNOSIS — M25.522 LEFT ELBOW PAIN: ICD-10-CM

## 2025-05-08 LAB
POC APPEARANCE, URINE: CLEAR
POC BILIRUBIN, URINE: NEGATIVE
POC BLOOD, URINE: NEGATIVE
POC COLOR, URINE: YELLOW
POC GLUCOSE, URINE: NEGATIVE MG/DL
POC KETONES, URINE: ABNORMAL MG/DL
POC LEUKOCYTES, URINE: NEGATIVE
POC NITRITE,URINE: NEGATIVE
POC PH, URINE: 7 PH
POC PROTEIN, URINE: ABNORMAL MG/DL
POC SPECIFIC GRAVITY, URINE: 1.01
POC UROBILINOGEN, URINE: 0.2 EU/DL

## 2025-05-08 PROCEDURE — 81002 URINALYSIS NONAUTO W/O SCOPE: CPT | Performed by: INTERNAL MEDICINE

## 2025-05-08 PROCEDURE — 1170F FXNL STATUS ASSESSED: CPT | Performed by: INTERNAL MEDICINE

## 2025-05-08 PROCEDURE — G0439 PPPS, SUBSEQ VISIT: HCPCS | Performed by: INTERNAL MEDICINE

## 2025-05-08 PROCEDURE — 99214 OFFICE O/P EST MOD 30 MIN: CPT | Performed by: INTERNAL MEDICINE

## 2025-05-08 PROCEDURE — 1126F AMNT PAIN NOTED NONE PRSNT: CPT | Performed by: INTERNAL MEDICINE

## 2025-05-08 ASSESSMENT — ACTIVITIES OF DAILY LIVING (ADL)
DOING_HOUSEWORK: INDEPENDENT
TAKING_MEDICATION: INDEPENDENT
MANAGING_FINANCES: INDEPENDENT
BATHING: INDEPENDENT
DRESSING: INDEPENDENT
GROCERY_SHOPPING: INDEPENDENT

## 2025-05-08 ASSESSMENT — ENCOUNTER SYMPTOMS
OCCASIONAL FEELINGS OF UNSTEADINESS: 0
DEPRESSION: 0
LOSS OF SENSATION IN FEET: 1

## 2025-05-08 ASSESSMENT — PAIN SCALES - GENERAL: PAINLEVEL_OUTOF10: 0-NO PAIN

## 2025-05-09 ENCOUNTER — CLINICAL SUPPORT (OUTPATIENT)
Dept: AUDIOLOGY | Facility: CLINIC | Age: 75
End: 2025-05-09
Payer: MEDICARE

## 2025-05-09 ENCOUNTER — APPOINTMENT (OUTPATIENT)
Dept: OTOLARYNGOLOGY | Facility: CLINIC | Age: 75
End: 2025-05-09
Payer: MEDICARE

## 2025-05-09 VITALS — HEIGHT: 66 IN | BODY MASS INDEX: 23.46 KG/M2 | WEIGHT: 146 LBS

## 2025-05-09 DIAGNOSIS — M26.609 TMJ DYSFUNCTION: ICD-10-CM

## 2025-05-09 DIAGNOSIS — H61.23 BILATERAL IMPACTED CERUMEN: Primary | ICD-10-CM

## 2025-05-09 DIAGNOSIS — H93.8X2 EAR FULLNESS, LEFT: Primary | ICD-10-CM

## 2025-05-09 DIAGNOSIS — H92.02 LEFT EAR PAIN: ICD-10-CM

## 2025-05-09 DIAGNOSIS — H93.8X3 SENSATION OF FULLNESS IN BOTH EARS: ICD-10-CM

## 2025-05-09 PROCEDURE — 92567 TYMPANOMETRY: CPT | Performed by: AUDIOLOGIST

## 2025-05-09 PROCEDURE — 1036F TOBACCO NON-USER: CPT | Performed by: NURSE PRACTITIONER

## 2025-05-09 PROCEDURE — 99213 OFFICE O/P EST LOW 20 MIN: CPT | Performed by: NURSE PRACTITIONER

## 2025-05-09 PROCEDURE — 1159F MED LIST DOCD IN RCRD: CPT | Performed by: NURSE PRACTITIONER

## 2025-05-09 PROCEDURE — 69210 REMOVE IMPACTED EAR WAX UNI: CPT | Performed by: NURSE PRACTITIONER

## 2025-05-09 PROCEDURE — 1160F RVW MEDS BY RX/DR IN RCRD: CPT | Performed by: NURSE PRACTITIONER

## 2025-05-09 RX ORDER — CETIRIZINE HYDROCHLORIDE 5 MG/1
TABLET, CHEWABLE ORAL DAILY
COMMUNITY

## 2025-05-09 ASSESSMENT — PATIENT HEALTH QUESTIONNAIRE - PHQ9
2. FEELING DOWN, DEPRESSED OR HOPELESS: NOT AT ALL
SUM OF ALL RESPONSES TO PHQ9 QUESTIONS 1 AND 2: 0
1. LITTLE INTEREST OR PLEASURE IN DOING THINGS: NOT AT ALL

## 2025-05-09 NOTE — PROGRESS NOTES
TYMPANOMETRY ONLY    Name:  Victorina Velez  :  1950  Age:  74 y.o.  Date of Evaluation:  2025      Patient seen for tympanometry only at the request of Kylee Mckeon CNP.    IMPRESSIONS:  Today's test results suggest normal middle ear function.    Immittance:   Immittance Measures: 226 Hz          Right Ear: Type A: Intact tympanic membrane with normal middle ear pressure and compliance         Left Ear:  Type A: Intact tympanic membrane with normal middle ear pressure and compliance        Estella Johnson, CCC-A  Senior Clinical Audiologist     Time: [id number]      Degree of   Hearing Sensitivity dB Range   Within Normal Limits (WNL) 0 - 20   Slight 25   Mild 26 - 40   Moderate 41 - 55   Moderately-Severe 56 - 70   Severe 71 - 90   Profound 91 +     KEY  TM Tympanic Membrane   WNL Within Normal Limits   HA Hearing Aid   SNHL Sensorineural Hearing Loss   CHL Conductive Hearing Loss   NIHL Noise-Induced Hearing Loss   ECV Ear Canal Volume

## 2025-05-09 NOTE — PROGRESS NOTES
Subjective   Patient ID: Victorina Velez is a 74 y.o. female who presents for Earache (Left bilateral, fluid in ears ) and ear itching  (And sensitivity for loud noises ).  Earache       This patient presents for a follow-up visit.  In review, she has seen me in the past for cerumen impactions and BPPV.  Today, she presents with complaints of bilateral aural fullness, left-sided otalgia, left ear canal itching and bilateral sound sensitivity.  She was concerned that she had an infection and was evaluated in urgent care 4/26/2025.  She also saw her PCP yesterday.  While in urgent care, she was noted to have a left middle ear effusion.  She has been taking her nasal sprays as well as Sudafed and Zyrtec.  Review of Systems   HENT:  Positive for ear pain.      A comprehensive or 10 points review of the patient's constitutional, neurological, HEENT, pulmonary, cardiovascular and genito-urinary systems showed only those mentioned in history of present illness.    Objective   Physical Exam  Constitutional: no fever, chills, weight loss or weight gain   General appearance: Appears well, well-nourished, well groomed. No acute distress.   Communication: Normal communication   Psychiatric: Oriented to person, place and time. Normal mood and affect.   Neurologic: Cranial nerves II-XII grossly intact and symmetric bilaterally.   Head and Face:   Head: Atraumatic with no masses, lesions or scarring.   Face: Normal symmetry, no paralysis, synkinesis or facial tic. No scars or deformities.   TMJs: Right greater than left crepitus, significant bilateral tenderness  Eyes: Conjunctiva not edematous or erythematous   Ears: External inspection of ears with no deformity, scars or masses.  Bilateral canals with cerumen impactions.     Neck: Normal appearing, symmetric, trachea midline.   Cardiovascular: Examination of peripheral vascular system shows no clubbing or cyanosis.   Respiratory: No respiratory distress increased work of breathing.  Inspection of the chest with symmetric chest expansion and normal respiratory effort.   Skin: No rashes in the head or neck    Tympanograms today were both normal.  Assessment/Plan        This patient presents for subsequent evaluation of acute acquired bilateral cerumen impaction bilateral aural fullness, left-sided otalgia and TMJ dysfunction.    Reassurance given that otologic exam is normal after cleaning.  Skin of inferior ear canals is dry which is likely the cause of the itching.  I recommended using an oil-based drops twice per month to prevent dryness.  Both eardrums are normal.  I did not appreciate any effusions on either side but did recommend tympanograms to confirm this.  Both tympanograms were normal.  I recommended she continue her nasal sprays but she is fine to discontinue Zyrtec and Sudafed.  I recommended starting comfort measures for TMJ dysfunction.  If current symptoms have not improved by next week, I asked that she contact my office.  Otherwise, she will continue to see me yearly for cerumen removal and as needed for recurrent BPPV.  Patient is in agreement with the plan.  All questions were answered to patient's satisfaction.    This note was created using speech recognition transcription software. Despite proofreading, several typographical errors might be present that might affect the meaning of the content. Please call with any questions.  Patient ID: Victorina Velez is a 74 y.o. female.    Ear cerumen removal    Date/Time: 5/9/2025 11:53 AM    Performed by: TABATHA Arellano  Authorized by: TABATHA Arellano    Consent:     Consent obtained:  Verbal    Consent given by:  Patient    Risks discussed:  Pain    Alternatives discussed:  No treatment  Procedure details:     Location:  L ear and R ear    Procedure type: curette      Procedure outcomes: cerumen removed    Post-procedure details:     Inspection:  No bleeding, ear canal clear and TM intact    Hearing quality:   Normal    Procedure completion:  Tolerated well, no immediate complications      JAMAR Arellano-CNP 05/09/25 11:49 AM

## 2025-05-10 LAB
25(OH)D3+25(OH)D2 SERPL-MCNC: 88 NG/ML (ref 30–100)
ALBUMIN SERPL-MCNC: 4.5 G/DL (ref 3.6–5.1)
ALP SERPL-CCNC: 82 U/L (ref 37–153)
ALT SERPL-CCNC: 16 U/L (ref 6–29)
ANION GAP SERPL CALCULATED.4IONS-SCNC: 7 MMOL/L (CALC) (ref 7–17)
AST SERPL-CCNC: 18 U/L (ref 10–35)
BASOPHILS # BLD AUTO: 20 CELLS/UL (ref 0–200)
BASOPHILS NFR BLD AUTO: 0.4 %
BILIRUB SERPL-MCNC: 0.6 MG/DL (ref 0.2–1.2)
BUN SERPL-MCNC: 17 MG/DL (ref 7–25)
CALCIUM SERPL-MCNC: 9 MG/DL (ref 8.6–10.4)
CHLORIDE SERPL-SCNC: 105 MMOL/L (ref 98–110)
CHOLEST SERPL-MCNC: 215 MG/DL
CHOLEST/HDLC SERPL: 3.5 (CALC)
CO2 SERPL-SCNC: 29 MMOL/L (ref 20–32)
CREAT SERPL-MCNC: 0.79 MG/DL (ref 0.6–1)
CRP SERPL HS-MCNC: NORMAL MG/L
EGFRCR SERPLBLD CKD-EPI 2021: 78 ML/MIN/1.73M2
EOSINOPHIL # BLD AUTO: 59 CELLS/UL (ref 15–500)
EOSINOPHIL NFR BLD AUTO: 1.2 %
ERYTHROCYTE [DISTWIDTH] IN BLOOD BY AUTOMATED COUNT: 13 % (ref 11–15)
GLUCOSE SERPL-MCNC: 103 MG/DL (ref 65–99)
HCT VFR BLD AUTO: 40.7 % (ref 35–45)
HCV AB SERPL QL IA: NORMAL
HDLC SERPL-MCNC: 62 MG/DL
HGB BLD-MCNC: 13.2 G/DL (ref 11.7–15.5)
LDLC SERPL CALC-MCNC: 128 MG/DL (CALC)
LYMPHOCYTES # BLD AUTO: 1294 CELLS/UL (ref 850–3900)
LYMPHOCYTES NFR BLD AUTO: 26.4 %
MCH RBC QN AUTO: 29 PG (ref 27–33)
MCHC RBC AUTO-ENTMCNC: 32.4 G/DL (ref 32–36)
MCV RBC AUTO: 89.5 FL (ref 80–100)
MONOCYTES # BLD AUTO: 441 CELLS/UL (ref 200–950)
MONOCYTES NFR BLD AUTO: 9 %
NEUTROPHILS # BLD AUTO: 3087 CELLS/UL (ref 1500–7800)
NEUTROPHILS NFR BLD AUTO: 63 %
NONHDLC SERPL-MCNC: 153 MG/DL (CALC)
PLATELET # BLD AUTO: 293 THOUSAND/UL (ref 140–400)
PMV BLD REES-ECKER: 9.3 FL (ref 7.5–12.5)
POTASSIUM SERPL-SCNC: 4.1 MMOL/L (ref 3.5–5.3)
PROT SERPL-MCNC: 6.7 G/DL (ref 6.1–8.1)
RBC # BLD AUTO: 4.55 MILLION/UL (ref 3.8–5.1)
SODIUM SERPL-SCNC: 141 MMOL/L (ref 135–146)
TRIGL SERPL-MCNC: 134 MG/DL
TSH SERPL-ACNC: 2.38 MIU/L (ref 0.4–4.5)
VIT B12 SERPL-MCNC: 380 PG/ML (ref 200–1100)
WBC # BLD AUTO: 4.9 THOUSAND/UL (ref 3.8–10.8)

## 2025-05-12 LAB
25(OH)D3+25(OH)D2 SERPL-MCNC: 88 NG/ML (ref 30–100)
ALBUMIN SERPL-MCNC: 4.5 G/DL (ref 3.6–5.1)
ALP SERPL-CCNC: 82 U/L (ref 37–153)
ALT SERPL-CCNC: 16 U/L (ref 6–29)
ANION GAP SERPL CALCULATED.4IONS-SCNC: 7 MMOL/L (CALC) (ref 7–17)
AST SERPL-CCNC: 18 U/L (ref 10–35)
BASOPHILS # BLD AUTO: 20 CELLS/UL (ref 0–200)
BASOPHILS NFR BLD AUTO: 0.4 %
BILIRUB SERPL-MCNC: 0.6 MG/DL (ref 0.2–1.2)
BUN SERPL-MCNC: 17 MG/DL (ref 7–25)
CALCIUM SERPL-MCNC: 9 MG/DL (ref 8.6–10.4)
CHLORIDE SERPL-SCNC: 105 MMOL/L (ref 98–110)
CHOLEST SERPL-MCNC: 215 MG/DL
CHOLEST/HDLC SERPL: 3.5 (CALC)
CO2 SERPL-SCNC: 29 MMOL/L (ref 20–32)
CREAT SERPL-MCNC: 0.79 MG/DL (ref 0.6–1)
CRP SERPL HS-MCNC: 1.3 MG/L
EGFRCR SERPLBLD CKD-EPI 2021: 78 ML/MIN/1.73M2
EOSINOPHIL # BLD AUTO: 59 CELLS/UL (ref 15–500)
EOSINOPHIL NFR BLD AUTO: 1.2 %
ERYTHROCYTE [DISTWIDTH] IN BLOOD BY AUTOMATED COUNT: 13 % (ref 11–15)
GLUCOSE SERPL-MCNC: 103 MG/DL (ref 65–99)
HCT VFR BLD AUTO: 40.7 % (ref 35–45)
HCV AB SERPL QL IA: NORMAL
HDLC SERPL-MCNC: 62 MG/DL
HGB BLD-MCNC: 13.2 G/DL (ref 11.7–15.5)
LDLC SERPL CALC-MCNC: 128 MG/DL (CALC)
LYMPHOCYTES # BLD AUTO: 1294 CELLS/UL (ref 850–3900)
LYMPHOCYTES NFR BLD AUTO: 26.4 %
MCH RBC QN AUTO: 29 PG (ref 27–33)
MCHC RBC AUTO-ENTMCNC: 32.4 G/DL (ref 32–36)
MCV RBC AUTO: 89.5 FL (ref 80–100)
MONOCYTES # BLD AUTO: 441 CELLS/UL (ref 200–950)
MONOCYTES NFR BLD AUTO: 9 %
NEUTROPHILS # BLD AUTO: 3087 CELLS/UL (ref 1500–7800)
NEUTROPHILS NFR BLD AUTO: 63 %
NONHDLC SERPL-MCNC: 153 MG/DL (CALC)
PLATELET # BLD AUTO: 293 THOUSAND/UL (ref 140–400)
PMV BLD REES-ECKER: 9.3 FL (ref 7.5–12.5)
POTASSIUM SERPL-SCNC: 4.1 MMOL/L (ref 3.5–5.3)
PROT SERPL-MCNC: 6.7 G/DL (ref 6.1–8.1)
RBC # BLD AUTO: 4.55 MILLION/UL (ref 3.8–5.1)
SODIUM SERPL-SCNC: 141 MMOL/L (ref 135–146)
TRIGL SERPL-MCNC: 134 MG/DL
TSH SERPL-ACNC: 2.38 MIU/L (ref 0.4–4.5)
VIT B12 SERPL-MCNC: 380 PG/ML (ref 200–1100)
WBC # BLD AUTO: 4.9 THOUSAND/UL (ref 3.8–10.8)

## 2025-05-14 ENCOUNTER — TREATMENT (OUTPATIENT)
Dept: PHYSICAL THERAPY | Facility: CLINIC | Age: 75
End: 2025-05-14
Payer: MEDICARE

## 2025-05-14 DIAGNOSIS — M43.6 NECK STIFFNESS: ICD-10-CM

## 2025-05-14 DIAGNOSIS — R42 VERTIGO: ICD-10-CM

## 2025-05-14 DIAGNOSIS — Z91.81 HISTORY OF FALLING: Primary | ICD-10-CM

## 2025-05-14 DIAGNOSIS — M54.2 NECK PAIN: ICD-10-CM

## 2025-05-14 DIAGNOSIS — R26.81 UNSTEADINESS: ICD-10-CM

## 2025-05-14 DIAGNOSIS — M54.2 CERVICALGIA: ICD-10-CM

## 2025-05-14 DIAGNOSIS — R42 DIZZINESS: ICD-10-CM

## 2025-05-14 PROCEDURE — 97110 THERAPEUTIC EXERCISES: CPT | Mod: GP | Performed by: PHYSICAL THERAPIST

## 2025-05-14 PROCEDURE — 97140 MANUAL THERAPY 1/> REGIONS: CPT | Mod: GP | Performed by: PHYSICAL THERAPIST

## 2025-05-14 NOTE — PROGRESS NOTES
Vestibular Physical Therapy Initial Examination Note    Patient Name: Victorina Velez  MRN Number: [id number]  Initial Examination Date: 5/14/2025  Referring Clinician: Kylee Mckeon APRN-*  Reason for Visit: Dizziness    Insurance  Visit Number: 8   Approved Visits: Medical necessity.  Certification/ POC Period: 3/4/25 - 5/13/25  Coverage: Payor: MEDICARE / Plan: MEDICARE PART A AND B / Product Type: *No Product type* /     Precautions/ History  Bilateral hip replacement R 2002/ L 2024; history of lung cancer 2014; breast cancer 2016.     Problem List  Problem List Items Addressed This Visit           ICD-10-CM    Dizziness R42    Cervicalgia M54.2    Unsteadiness R26.81    History of falling - Primary Z91.81     Other Visit Diagnoses         Codes      Vertigo     R42      Neck stiffness     M43.6      Neck pain     M54.2          Subjective  When she had fluid build up in the left ear, did have some dizziness. Primary care put him on medication, could have dried her ears out and cause the itching/ pressure.     Manual Therapy   - soft tissue mobilization, left upper trapezius, distal SCM/ scalenes   - CT junction CPA    Therapeutic Exercise   - scalene stretch   - HEP printed/ issued/ discussed    Assessment  Pain continues to reduce with manual care. Resolution of clogging feeling following manual care. SCM/ scalene tightness contributing to neck pain and dysfunction.    Problem List: Functional Limitations/ Difficulties, Dizziness Handicap, Dizziness, and Postural Instability/ Fall Risk    Plan  Plan for supine cervical manipulations.     Planned interventions include: Patient education, Home exercise program, Therapeutic exercise , Manual therapy, Neuromuscular re-education, and Spinal Manipulation Spinal manipulation feels great. Treadmill, recumbent bike. Weights at home. Has 3-10 pound dumbbells at home.    1 / Week for 10 weeks    Home Program  Access Code: [email or web address]  URL:  [email or web address]  Date: 03/18/2025  Prepared by: Judd Crouch    Exercises  - Seated Upper Trapezius Stretch  - 1-2 x daily - 7 x weekly - 3 reps - 20 sec hold  - Standing Balance in Corner with Eyes Closed (chair in front)  - 1-2 x daily - 7 x weekly - 2 sets - 1-2 min hold  - Corner Balance Feet Together With Eyes Open (chair in front)  - 1-2 x daily - 7 x weekly - 2 reps - 1-2 min hold  - Single Leg Stance with Support  - 1-2 x daily - 7 x weekly - 2 reps - 1-2 min hold  -------------------------------------------------------------------------------------------------------------------------  Access Code: [email or web address]  URL: [email or web address]  Date: 04/22/2025  Prepared by: Judd Crouch    Exercises  - Seated Upper Trapezius Stretch  - 1-2 x daily - 7 x weekly - 3 reps - 20 sec hold  - Seated Assisted Cervical Rotation with Towel  - 1-2 x daily - 7 x weekly - 3 reps - 20 se hold  - Standing Balance in Corner with Eyes Closed (chair in front)  - 1-2 x daily - 7 x weekly - 2 sets - 1-2 min hold  - Corner Balance Feet Together With Eyes Open (chair in front)  - 1-2 x daily - 7 x weekly - 2 reps - 1-2 min hold  - Single Leg Stance with Support  - 1-2 x daily - 7 x weekly - 2 reps - 1-2 min hold  - Standing Shoulder Flexion Wall Slides  - 1-2 x daily - 7 x weekly - 10 reps - 10 sec hold  - Standing Shoulder Horizontal Abduction with Resistance  - 3 x weekly - 2 sets - 10-20 reps  -------------------------------------------------------------------------------------------------------------------------  Access Code: [email or web address]  URL: [email or web address]  Date: 04/22/2025  Prepared by: Judd Crouch    Exercises  - Seated Upper Trapezius Stretch  - 1-2 x daily - 7 x weekly - 3 reps - 20 sec hold  - Seated Assisted Cervical Rotation with Towel  - 1-2 x daily - 7 x weekly - 3 reps - 20 se hold  - Standing Balance in Corner with Eyes Closed (chair in  front)  - 1-2 x daily - 7 x weekly - 2 sets - 1-2 min hold  - Corner Balance Feet Together With Eyes Open (chair in front)  - 1-2 x daily - 7 x weekly - 2 reps - 1-2 min hold  - Single Leg Stance with Support  - 1-2 x daily - 7 x weekly - 2 reps - 1-2 min hold  - Standing Shoulder Flexion Wall Slides  - 1-2 x daily - 7 x weekly - 10 reps - 10 sec hold  - Cervical Extension AROM with Strap  - 1-2 x daily - 7 x weekly - 10 reps - 10 sec hold  - Standing Shoulder Horizontal Abduction with Resistance  - 3 x weekly - 2 sets - 10-20 reps  -------------------------------------------------------------------------------------------------------------------------  Access Code: [email or web address]  URL: [email or web address]  Date: 05/16/2025  Prepared by: Judd Crouch    Exercises  - Seated Upper Trapezius Stretch  - 1-2 x daily - 7 x weekly - 3 reps - 20 sec hold  - Seated Assisted Cervical Rotation with Towel  - 1-2 x daily - 7 x weekly - 3 reps - 20 se hold  - Seated Scalene Stretch with Towel  - 1-2 x daily - 7 x weekly - 3 reps - 20 sec hold  - Standing Balance in Corner with Eyes Closed (chair in front)  - 1-2 x daily - 7 x weekly - 2 sets - 1-2 min hold  - Corner Balance Feet Together With Eyes Open (chair in front)  - 1-2 x daily - 7 x weekly - 2 reps - 1-2 min hold  - Single Leg Stance with Support  - 1-2 x daily - 7 x weekly - 2 reps - 1-2 min hold  - Standing Shoulder Flexion Wall Slides  - 1-2 x daily - 7 x weekly - 10 reps - 10 sec hold  - Cervical Extension AROM with Strap  - 1-2 x daily - 7 x weekly - 10 reps - 10 sec hold  - Standing Shoulder Horizontal Abduction with Resistance  - 3 x weekly - 2 sets - 10-20 reps    Goals  - Full return to prior level of function to allow continued working capability.  - Reduce DHI outcome measure goal to less than or equal to 6 for meaningful and clinical improvements in dizziness condition.  - Full resolution of dizziness to improve quality of life.  - Patient  to have no falls and negative test on condition 7 within DARRON SOP testing to improve postural control and balance.  - Increase cervical AROM greater than or equal to 60° into flexion, 35° into extension, 30° bilateral into side bending and 55° bilateral into rotation without pain for ease of gazing while driving and neck movement during chores.    Plan of care developed in agreement with patient.     Personal Factors Affecting Care: Comorbidities  PT Clinical Presentation: Evolving with changing characteristics  Rehab Potential: Good       Screening  Frequency  Date Last Completed   Spiritual and Cultural Beliefs   Screening  each visit or episode of care    Falls Risk Screening  every ambulatory visit    Pain Screening  annually at primary care visit  5/8/2025   Domestic Violence screening  annually at primary care visit    Elder Abuse Screening  annually at primary care visit    Depression Screening  annually in the primary care setting 5/9/2025   Suicide Risk Screening  annually in the primary care setting 2/19/2024   Nutrition and Food Insecurity   Screening  at least annually at primary care visit  2/19/2024   Key Learner  annually in the primary care setting    Drug Screen  5/8/2025  7:25 AM   Alcohol Screen  5/8/2025  7:42 AM   Advance Directive  5/8/2025          Time Calculation  Start Time: 0403  Stop Time: 0446  Time Calculation (min): 43 min     PT Therapeutic Procedures Time Entry  Manual Therapy Time Entry: 30  Therapeutic Exercise Time Entry: 8

## 2025-05-15 ENCOUNTER — APPOINTMENT (OUTPATIENT)
Dept: ORTHOPEDIC SURGERY | Facility: CLINIC | Age: 75
End: 2025-05-15
Payer: MEDICARE

## 2025-05-15 DIAGNOSIS — S52.122S CLOSED DISPLACED FRACTURE OF HEAD OF LEFT RADIUS, SEQUELA: Primary | ICD-10-CM

## 2025-05-15 DIAGNOSIS — M25.522 LEFT ELBOW PAIN: ICD-10-CM

## 2025-05-15 NOTE — LETTER
2015.  The clunk is much more recent in onset.  No interval trauma.  No numbness or tingling.  No other active upper extremity concerns.    She is not diabetic.  She is hypothyroid.  She does not smoke.      REVIEW OF SYSTEMS       A 30-item multi-system Review Of Systems was obtained on today's intake form.  This was reviewed with the patient and is correct.  The pertinent positives and negatives are listed above.  The form has been scanned separately into the medical record.      PHYSICAL EXAM    Constitutional:    Appears stated age. Well-developed and well-nourished female in no acute distress.  Psychiatric:         Pleasant normal mood and affect. Behavior is appropriate for the situation.   Head:                   Normocephalic and atraumatic.  Eyes:                    Pupils are equal and round.  Cardiovascular:  2+ radial and ulnar pulses. Fingers well-perfused.  Respiratory:        Effort normal. No respiratory distress. Speaking in complete sentences.  Neurologic:       Alert and oriented to person, place, and time.  Skin:                Skin is intact, warm and dry.  Hematologic / Lymphatic:    No lymphedema or lymphangitis.    Extremities / Musculoskeletal:                      Skin of the left elbow and forearm is intact with no erythema, ecchymosis, or diffuse swelling.  Well-healed scar consistent with the given surgical history.  Elbow is clinically well aligned.  Full finger flexion extension with good intrinsic plus minus posture.  Sensation intact to light touch in all distributions.  Capillary refill less than 2 seconds.  Elbow collaterals are stable.  Rotational motion is essentially full.  Elbow flexion extension hinge motion is essentially full.  With the forearm fully pronated, there is a distinct double pop at about 30 and 60 degrees of elbow flexion, but smooth extension.  With the forearm fully supinated, there is smooth flexion but a distinct pop at about 60 degrees as she extends.   Sensation intact to light touch in all distributions.  Capillary refill less than 2 seconds.      IMAGING / LABS / EMGs           X-rays left elbow from April 28 were independently interpreted by me today show a Synthes proximal ulna locking plate and a probable [employer] medical radial head arthroplasty.  There is either overgrowth/HO or deliberate incomplete resection of the radial head.  The fracture has healed.  Small coronoid osteophyte and mild narrowing of the radiocapitellar joint, though some cartilage does remain.      Medical History[1]    Medication Documentation Review Audit       Reviewed by TABATHA Arellano (Nurse Practitioner) on 05/09/25 at 1149      Medication Order Taking? Sig Documenting Provider Last Dose Status   alirocumab (Praluent Pen) 75 mg/mL pen injector [id number] Yes Inject 75 mg under the skin every 14 (fourteen) days. Raghavendra Chávez MD  Active   biotin 5 mg tablet [id number] Yes Take by mouth. Historical MD Checo  Active   calcium carbonate (CALCIUM 500 ORAL) [id number] Yes Take 1 capsule by mouth 2 times a week. Historical Provider, MD  Active   cetirizine (ZyrTEC) 5 mg chewable tablet [id number]  Chew once daily. Historical Provider, MD  Active   cholecalciferol (Vitamin D-3) 125 MCG (5000 UT) capsule [id number] Yes Take 1 tablet by mouth once daily. Historical Provider, MD  Active   estradiol (Estrace) 0.01 % (0.1 mg/gram) vaginal cream [id number] Yes Insert 0.125 Applicatorfuls (0.5 g) into the vagina 3 times a week. Rani Hua MD  Active   magnesium oxide (Mag-Ox) 400 mg tablet [id number] Yes Take 1 tablet (400 mg) by mouth once daily. Historical MD Checo  Active     Discontinued 05/08/25 0739   multivitamin tablet [id number] Yes Take 1 tablet by mouth 1 time. Historical Provider, MD  Active   neomycin-polymyxin B-dexameth (Polydex) 3.5 mg/g-10,000 unit/g-0.1 % ointment ophthalmic ointment [id number] Yes APPLY TO BOTH UPPER EYELIDS TWICE A DAY AS DIRECTED FOR 10  DAYS Historical Provider, MD  Active   omeprazole (PriLOSEC) 20 mg DR capsule [id number] Yes TAKE 2 CAPSULES (40 MG) BY MOUTH ONCE DAILY. DO NOT CRUSH OR CHEW. Raghavendra Chávez MD  Active   THYROID ORAL [id number] Yes Take 1 Capful by mouth once daily. Plant based for T3-T4- holistic pharmacy Historical Provider, MD  Active                    RX Allergies[2]    Social History     Socioeconomic History   • Marital status: Single     Spouse name: Not on file   • Number of children: Not on file   • Years of education: Not on file   • Highest education level: Not on file   Occupational History   • Not on file   Tobacco Use   • Smoking status: Former     Types: Cigarettes   • Smokeless tobacco: Never   • Tobacco comments:     Smoked during her teenage years. None since   Vaping Use   • Vaping status: Never Used   Substance and Sexual Activity   • Alcohol use: Yes     Alcohol/week: 1.0 standard drink of alcohol     Types: 1 Glasses of wine per week   • Drug use: Never   • Sexual activity: Not Currently     Birth control/protection: Post-menopausal, None   Other Topics Concern   • Not on file   Social History Narrative   • Not on file     Social Drivers of Health     Financial Resource Strain: Low Risk  (2/19/2024)    Overall Financial Resource Strain (CARDIA)    • Difficulty of Paying Living Expenses: Not hard at all   Food Insecurity: No Food Insecurity (2/19/2024)    Hunger Vital Sign    • Worried About Running Out of Food in the Last Year: Never true    • Ran Out of Food in the Last Year: Never true   Transportation Needs: No Transportation Needs (3/5/2024)    OASIS : Transportation    • Lack of Transportation (Medical): No    • Lack of Transportation (Non-Medical): No    • Patient Unable or Declines to Respond: No   Physical Activity: Insufficiently Active (2/19/2024)    Exercise Vital Sign    • Days of Exercise per Week: 7 days    • Minutes of Exercise per Session: 20 min   Stress: No Stress Concern Present  (2/19/2024)    [employer] [employer] - Occupational Stress Questionnaire    • Feeling of Stress : Not at all   Social Connections: Feeling Socially Integrated (3/5/2024)    OASIS : Social Isolation    • Frequency of experiencing loneliness or isolation: Never   Recent Concern: Social Connections - Moderately Isolated (2/19/2024)    Social Connection and Isolation Panel [NHANES]    • Frequency of Communication with Friends and Family: More than three times a week    • Frequency of Social Gatherings with Friends and Family: More than three times a week    • Attends Judaism Services: Never    • Active Member of Clubs or Organizations: Yes    • Attends Club or Organization Meetings: 1 to 4 times per year    • Marital Status:    Intimate Partner Violence: Not At Risk (2/19/2024)    Humiliation, Afraid, Rape, and Kick questionnaire    • Fear of Current or Ex-Partner: No    • Emotionally Abused: No    • Physically Abused: No    • Sexually Abused: No   Housing Stability: Low Risk  (2/19/2024)    Housing Stability Vital Sign    • Unable to Pay for Housing in the Last Year: No    • Number of Places Lived in the Last Year: 1    • Unstable Housing in the Last Year: No       Surgical History[3]      Electronically Signed      JEMMA Thakur MD      Orthopaedic Hand Surgery      [phone]         [1]  Past Medical History:  Diagnosis Date   • Anxiety    • BRCA1 negative Dont remember   • BRCA2 negative Don’t remember   • Breast cancer    • Chronic rhinitis 04/24/2019    Chronic rhinitis   • Dry eyes    • Dysphonia 08/26/2021    Hoarseness   • Dyspnea, unspecified 02/09/2016    Acute dyspnea   • Familial hypercholesterolemia 04/24/2018    Essential familial hypercholesterolemia   • GERD (gastroesophageal reflux disease)    • Hx antineoplastic chemo 2014   • HX: breast cancer 09/2016    s/p partial mastectomy w/ radiation and tamoxifen (ductal and lobular carcinoma in situ)   •  Hypothyroidism    • Lobular carcinoma in situ 2016   • Osteoporosis    • Pericarditis ([facility]CC)    • Personal history of irradiation    • Personal history of other endocrine, nutritional and metabolic disease 04/27/2020    History of hypothyroidism   • Personal history of pneumonia (recurrent) 07/28/2014    History of pneumonia   • Pleurodynia 04/17/2015    Chest pain, pleuritic   • Primary lung cancer (Multi) 2014    s/p concurrent chemoRT, RLL lobectomy and mediastinal node dissection, adjuvent chemo in [id number]   • Rosacea    • Urinary tract infection    [2]  Allergies  Allergen Reactions   • Amoxicillin-Pot Clavulanate Other   [3]  Past Surgical History:  Procedure Laterality Date   • BREAST LUMPECTOMY Left 09/09/2016    Postive for Ductal and lobular carcinoma in situ   • BRONCHOSCOPY  09/24/2014    w/ mediastinoscopy (Positive for primary lung cancer w/ mets to LN)   • CATARACT EXTRACTION     • CATARACT EXTRACTION, BILATERAL Bilateral 2022   • ELBOW FRACTURE SURGERY Left 06/12/2015    ORIF   • HIP SURGERY Right 02/21/2017    Hip Surgery- 2022   • JOINT REPLACEMENT     • LUNG LOBECTOMY Right 12/18/2014    RLL lobectomy and mediastinal LN dissection   • LUNG SURGERY Right 04/23/2015    VATS for pleural effusion s/p right lower lobectomy w/ chest tube   • MASTECTOMY, PARTIAL Left 09/26/2016    Positve margins on prior lumpectomy   • TOTAL HIP ARTHROPLASTY Left 02/19/2024

## 2025-05-15 NOTE — Clinical Note
May 16, 2025     Raghavendra Chávez MD  [address] [facility] [address]    Patient: Victorina Velez   YOB: 1950   Date of Visit: 5/15/2025       Dear Dr. Raghavendra Chávez MD:    Thank you for referring Victorina Velez to me for evaluation. Below are my notes for this consultation.  If you have questions, please do not hesitate to call me. I look forward to following your patient along with you.       Sincerely,     Arsen Thakur MD      CC: No Recipients  ______________________________________________________________________________________

## 2025-05-15 NOTE — PROGRESS NOTES
CHIEF COMPLAINT         Left elbow pain    ASSESSMENT + PLAN    Flexion-extension clunk of left elbow after remote complex fracture dislocation    We have a long discussion about the complex nature of this problem.  The ulna does appear to be healed, though perhaps a little straight.  The radial head replacement appears to be tracking normally, though there is some bony overgrowth or HO along one side.  I suspect this is what is producing the clunk, as the position of catching changes as the rotation of the forearm changes.  I reviewed the option of working around this by repositioning the forearm during daily activities.  I also discussed the possibility of improving things with surgical intervention.  I would want to get a CT scan of the left elbow with hardware artifact suppression in order to better characterize the bony architecture ahead of any surgery.    Contact my office if you would like to proceed with this.        HISTORY OF PRESENT ILLNESS       Patient is a 74 y.o. right-hand dominant female retiree, who presents today at request of Dr. Chávez for evaluation of a painful clunk of the left elbow.  This has been present for a couple of months.  The pain is in the antecubital.  It does not radiate.  She had a complex left elbow fracture dislocation managed by Dr. Leavitt about 10 years ago, with ORIF of the ulna and radial head replacement on Nina 15, 2015.  The clunk is much more recent in onset.  No interval trauma.  No numbness or tingling.  No other active upper extremity concerns.    She is not diabetic.  She is hypothyroid.  She does not smoke.      REVIEW OF SYSTEMS       A 30-item multi-system Review Of Systems was obtained on today's intake form.  This was reviewed with the patient and is correct.  The pertinent positives and negatives are listed above.  The form has been scanned separately into the medical record.      PHYSICAL EXAM    Constitutional:    Appears stated age. Well-developed and  well-nourished female in no acute distress.  Psychiatric:         Pleasant normal mood and affect. Behavior is appropriate for the situation.   Head:                   Normocephalic and atraumatic.  Eyes:                    Pupils are equal and round.  Cardiovascular:  2+ radial and ulnar pulses. Fingers well-perfused.  Respiratory:        Effort normal. No respiratory distress. Speaking in complete sentences.  Neurologic:       Alert and oriented to person, place, and time.  Skin:                Skin is intact, warm and dry.  Hematologic / Lymphatic:    No lymphedema or lymphangitis.    Extremities / Musculoskeletal:                      Skin of the left elbow and forearm is intact with no erythema, ecchymosis, or diffuse swelling.  Well-healed scar consistent with the given surgical history.  Elbow is clinically well aligned.  Full finger flexion extension with good intrinsic plus minus posture.  Sensation intact to light touch in all distributions.  Capillary refill less than 2 seconds.  Elbow collaterals are stable.  Rotational motion is essentially full.  Elbow flexion extension hinge motion is essentially full.  With the forearm fully pronated, there is a distinct double pop at about 30 and 60 degrees of elbow flexion, but smooth extension.  With the forearm fully supinated, there is smooth flexion but a distinct pop at about 60 degrees as she extends.  Sensation intact to light touch in all distributions.  Capillary refill less than 2 seconds.      IMAGING / LABS / EMGs           X-rays left elbow from April 28 were independently interpreted by me today show a Synthes proximal ulna locking plate and a probable [employer] medical radial head arthroplasty.  There is either overgrowth/HO or deliberate incomplete resection of the radial head.  The fracture has healed.  Small coronoid osteophyte and mild narrowing of the radiocapitellar joint, though some cartilage does remain.      Medical History[1]    Medication  Documentation Review Audit       Reviewed by TABATHA Arellano (Nurse Practitioner) on 05/09/25 at 1149      Medication Order Taking? Sig Documenting Provider Last Dose Status   alirocumab (Praluent Pen) 75 mg/mL pen injector [id number] Yes Inject 75 mg under the skin every 14 (fourteen) days. Raghavendra Chávez MD  Active   biotin 5 mg tablet [id number] Yes Take by mouth. Historical Provider, MD  Active   calcium carbonate (CALCIUM 500 ORAL) [id number] Yes Take 1 capsule by mouth 2 times a week. Historical Provider, MD  Active   cetirizine (ZyrTEC) 5 mg chewable tablet [id number]  Chew once daily. Historical Provider, MD  Active   cholecalciferol (Vitamin D-3) 125 MCG (5000 UT) capsule [id number] Yes Take 1 tablet by mouth once daily. Historical Provider, MD  Active   estradiol (Estrace) 0.01 % (0.1 mg/gram) vaginal cream [id number] Yes Insert 0.125 Applicatorfuls (0.5 g) into the vagina 3 times a week. Rani Hua MD  Active   magnesium oxide (Mag-Ox) 400 mg tablet [id number] Yes Take 1 tablet (400 mg) by mouth once daily. Historical Provider, MD  Active     Discontinued 05/08/25 0739   multivitamin tablet [id number] Yes Take 1 tablet by mouth 1 time. Historical Provider, MD  Active   neomycin-polymyxin B-dexameth (Polydex) 3.5 mg/g-10,000 unit/g-0.1 % ointment ophthalmic ointment [id number] Yes APPLY TO BOTH UPPER EYELIDS TWICE A DAY AS DIRECTED FOR 10 DAYS Historical Provider, MD  Active   omeprazole (PriLOSEC) 20 mg DR capsule [id number] Yes TAKE 2 CAPSULES (40 MG) BY MOUTH ONCE DAILY. DO NOT CRUSH OR CHEW. Raghavendra Chávez MD  Active   THYROID ORAL [id number] Yes Take 1 Capful by mouth once daily. Plant based for T3-T4- holistic pharmacy Historical Provider, MD  Active                    RX Allergies[2]    Social History     Socioeconomic History    Marital status: Single     Spouse name: Not on file    Number of children: Not on file    Years of education: Not on file    Highest education level: Not on file    Occupational History    Not on file   Tobacco Use    Smoking status: Former     Types: Cigarettes    Smokeless tobacco: Never    Tobacco comments:     Smoked during her teenage years. None since   Vaping Use    Vaping status: Never Used   Substance and Sexual Activity    Alcohol use: Yes     Alcohol/week: 1.0 standard drink of alcohol     Types: 1 Glasses of wine per week    Drug use: Never    Sexual activity: Not Currently     Birth control/protection: Post-menopausal, None   Other Topics Concern    Not on file   Social History Narrative    Not on file     Social Drivers of Health     Financial Resource Strain: Low Risk  (2/19/2024)    Overall Financial Resource Strain (CARDIA)     Difficulty of Paying Living Expenses: Not hard at all   Food Insecurity: No Food Insecurity (2/19/2024)    Hunger Vital Sign     Worried About Running Out of Food in the Last Year: Never true     Ran Out of Food in the Last Year: Never true   Transportation Needs: No Transportation Needs (3/5/2024)    OASIS : Transportation     Lack of Transportation (Medical): No     Lack of Transportation (Non-Medical): No     Patient Unable or Declines to Respond: No   Physical Activity: Insufficiently Active (2/19/2024)    Exercise Vital Sign     Days of Exercise per Week: 7 days     Minutes of Exercise per Session: 20 min   Stress: No Stress Concern Present (2/19/2024)    [place] [place] [employer] - Occupational Stress Questionnaire     Feeling of Stress : Not at all   Social Connections: Feeling Socially Integrated (3/5/2024)    OASIS : Social Isolation     Frequency of experiencing loneliness or isolation: Never   Recent Concern: Social Connections - Moderately Isolated (2/19/2024)    Social Connection and Isolation Panel [NHANES]     Frequency of Communication with Friends and Family: More than three times a week     Frequency of Social Gatherings with Friends and Family: More than three times a week     Attends  Mandaen Services: Never     Active Member of Clubs or Organizations: Yes     Attends Club or Organization Meetings: 1 to 4 times per year     Marital Status:    Intimate Partner Violence: Not At Risk (2/19/2024)    Humiliation, Afraid, Rape, and Kick questionnaire     Fear of Current or Ex-Partner: No     Emotionally Abused: No     Physically Abused: No     Sexually Abused: No   Housing Stability: Low Risk  (2/19/2024)    Housing Stability Vital Sign     Unable to Pay for Housing in the Last Year: No     Number of Places Lived in the Last Year: 1     Unstable Housing in the Last Year: No       Surgical History[3]      Electronically Signed      JEMMA Thakur MD      Orthopaedic Hand Surgery      [phone]         [1]   Past Medical History:  Diagnosis Date    Anxiety     BRCA1 negative Dont remember    BRCA2 negative Don’t remember    Breast cancer     Chronic rhinitis 04/24/2019    Chronic rhinitis    Dry eyes     Dysphonia 08/26/2021    Hoarseness    Dyspnea, unspecified 02/09/2016    Acute dyspnea    Familial hypercholesterolemia 04/24/2018    Essential familial hypercholesterolemia    GERD (gastroesophageal reflux disease)     Hx antineoplastic chemo 2014    HX: breast cancer 09/2016    s/p partial mastectomy w/ radiation and tamoxifen (ductal and lobular carcinoma in situ)    Hypothyroidism     Lobular carcinoma in situ 2016    Osteoporosis     Pericarditis ([facility]-HCC)     Personal history of irradiation     Personal history of other endocrine, nutritional and metabolic disease 04/27/2020    History of hypothyroidism    Personal history of pneumonia (recurrent) 07/28/2014    History of pneumonia    Pleurodynia 04/17/2015    Chest pain, pleuritic    Primary lung cancer (Multi) 2014    s/p concurrent chemoRT, RLL lobectomy and mediastinal node dissection, adjuvent chemo in 3830-4817    Rosacea     Urinary tract infection    [2]   Allergies  Allergen Reactions    Amoxicillin-Pot Clavulanate Other    [3]   Past Surgical History:  Procedure Laterality Date    BREAST LUMPECTOMY Left 09/09/2016    Postive for Ductal and lobular carcinoma in situ    BRONCHOSCOPY  09/24/2014    w/ mediastinoscopy (Positive for primary lung cancer w/ mets to LN)    CATARACT EXTRACTION      CATARACT EXTRACTION, BILATERAL Bilateral 2022    ELBOW FRACTURE SURGERY Left 06/12/2015    ORIF    HIP SURGERY Right 02/21/2017    Hip Surgery- 2022    JOINT REPLACEMENT      LUNG LOBECTOMY Right 12/18/2014    RLL lobectomy and mediastinal LN dissection    LUNG SURGERY Right 04/23/2015    VATS for pleural effusion s/p right lower lobectomy w/ chest tube    MASTECTOMY, PARTIAL Left 09/26/2016    Positve margins on prior lumpectomy    TOTAL HIP ARTHROPLASTY Left 02/19/2024

## 2025-05-27 ENCOUNTER — TREATMENT (OUTPATIENT)
Dept: PHYSICAL THERAPY | Facility: CLINIC | Age: 75
End: 2025-05-27
Payer: MEDICARE

## 2025-05-27 DIAGNOSIS — Z91.81 HISTORY OF FALLING: Primary | ICD-10-CM

## 2025-05-27 DIAGNOSIS — R26.81 UNSTEADINESS: ICD-10-CM

## 2025-05-27 DIAGNOSIS — M43.6 NECK STIFFNESS: ICD-10-CM

## 2025-05-27 DIAGNOSIS — M54.2 NECK PAIN: ICD-10-CM

## 2025-05-27 DIAGNOSIS — R42 VERTIGO: ICD-10-CM

## 2025-05-27 DIAGNOSIS — M54.2 CERVICALGIA: ICD-10-CM

## 2025-05-27 DIAGNOSIS — R42 DIZZINESS: ICD-10-CM

## 2025-05-27 PROCEDURE — 97140 MANUAL THERAPY 1/> REGIONS: CPT | Mod: GP | Performed by: PHYSICAL THERAPIST

## 2025-05-27 PROCEDURE — 97112 NEUROMUSCULAR REEDUCATION: CPT | Mod: GP | Performed by: PHYSICAL THERAPIST

## 2025-05-27 NOTE — PROGRESS NOTES
Vestibular Physical Therapy Initial Examination Note    Patient Name: Victorina Velez  MRN Number: [id number]  Initial Examination Date: 5/27/2025  Referring Clinician: Kylee Mckeon APRN-*  Reason for Visit: Dizziness    Insurance  Visit Number: 9   Approved Visits: Medical necessity.  Certification/ POC Period: 3/4/25 - 5/13/25  Coverage: Payor: MEDICARE / Plan: MEDICARE PART A AND B / Product Type: *No Product type* /     Precautions/ History  Bilateral hip replacement R 2002/ L 2024; history of lung cancer 2014; breast cancer 2016.     Problem List  Problem List Items Addressed This Visit           ICD-10-CM    Dizziness R42    Cervicalgia M54.2    Unsteadiness R26.81    History of falling - Primary Z91.81     Other Visit Diagnoses         Codes      Vertigo     R42      Neck stiffness     M43.6      Neck pain     M54.2          Subjective  Neck pain is periodic. Doing much better since starting PT. Still using pillows because of the fluid build up in her neck since the lung cancer. Has not had any dizziness, has not had any since 2-3 weeks after starting PT. Has not had a headache in a long time either. Doing exercises daily at home. Balance better since starting PT.     Prior level of function: No dizziness.   Patient Goals: Reduce dizziness, work on balance    Pain Scale: Neck  5-6/10 maximum in the last week    Inspection  Gait: Normal  Walking with vertical head movements: not tested  Walking with horizontal head movements: not  tested    Objective  DHI = 6; NDI = 0%    Canalith Testing Left Right Comments   Posterior Canal  none none loaded tomas-hallpike   Horizontal Canal none none not tested     [place] SOP  condition 1: Normal  condition 2: Normal  condition 3: Normal  condition 4: Sway (minimal)  condition 5: Normal  condition 6: Fall  condition 7: Normal (no dizziness, no rotation)    Treatment  Manual Therapy   - soft tissue mobilization, left upper trapezius, distal SCM/ scalenes   - CT junction  [facility]    Neuromuscular Reeducation   - updated objective measures for discharge    Assessment  Excellent progression in condition since beginning PT, supported by reduction in outcome measures, subjective reporting and objective measures. Elimination of dizziness and headaches, neck pain reduced as well. Discharge to home exercise program.     Problem List: Functional Limitations/ Difficulties, Dizziness Handicap, Dizziness, and Postural Instability/ Fall Risk    Plan  Planned interventions include: Patient education, Home exercise program, Therapeutic exercise , Manual therapy, Neuromuscular re-education, and Spinal Manipulation Spinal manipulation feels great. Treadmill, recumbent bike. Weights at home. Has 3-10 pound dumbbells at home.    1 / Week for 10 weeks    Home Program  Access Code: [email or web address]  URL: [email or web address]  Date: 03/18/2025  Prepared by: Judd Crouch    Exercises  - Seated Upper Trapezius Stretch  - 1-2 x daily - 7 x weekly - 3 reps - 20 sec hold  - Standing Balance in Corner with Eyes Closed (chair in front)  - 1-2 x daily - 7 x weekly - 2 sets - 1-2 min hold  - Corner Balance Feet Together With Eyes Open (chair in front)  - 1-2 x daily - 7 x weekly - 2 reps - 1-2 min hold  - Single Leg Stance with Support  - 1-2 x daily - 7 x weekly - 2 reps - 1-2 min hold  -------------------------------------------------------------------------------------------------------------------------  Access Code: [email or web address]  URL: [email or web address]  Date: 04/22/2025  Prepared by: Judd Philippe  - Seated Upper Trapezius Stretch  - 1-2 x daily - 7 x weekly - 3 reps - 20 sec hold  - Seated Assisted Cervical Rotation with Towel  - 1-2 x daily - 7 x weekly - 3 reps - 20 se hold  - Standing Balance in Corner with Eyes Closed (chair in front)  - 1-2 x daily - 7 x weekly - 2 sets - 1-2 min hold  - Corner Balance Feet Together With Eyes Open (chair in front)  - 1-2  x daily - 7 x weekly - 2 reps - 1-2 min hold  - Single Leg Stance with Support  - 1-2 x daily - 7 x weekly - 2 reps - 1-2 min hold  - Standing Shoulder Flexion Wall Slides  - 1-2 x daily - 7 x weekly - 10 reps - 10 sec hold  - Standing Shoulder Horizontal Abduction with Resistance  - 3 x weekly - 2 sets - 10-20 reps  -------------------------------------------------------------------------------------------------------------------------  Access Code: [email or web address]  URL: [email or web address]  Date: 04/22/2025  Prepared by: Judd Crouch    Exercises  - Seated Upper Trapezius Stretch  - 1-2 x daily - 7 x weekly - 3 reps - 20 sec hold  - Seated Assisted Cervical Rotation with Towel  - 1-2 x daily - 7 x weekly - 3 reps - 20 se hold  - Standing Balance in Corner with Eyes Closed (chair in front)  - 1-2 x daily - 7 x weekly - 2 sets - 1-2 min hold  - Corner Balance Feet Together With Eyes Open (chair in front)  - 1-2 x daily - 7 x weekly - 2 reps - 1-2 min hold  - Single Leg Stance with Support  - 1-2 x daily - 7 x weekly - 2 reps - 1-2 min hold  - Standing Shoulder Flexion Wall Slides  - 1-2 x daily - 7 x weekly - 10 reps - 10 sec hold  - Cervical Extension AROM with Strap  - 1-2 x daily - 7 x weekly - 10 reps - 10 sec hold  - Standing Shoulder Horizontal Abduction with Resistance  - 3 x weekly - 2 sets - 10-20 reps  -------------------------------------------------------------------------------------------------------------------------  Access Code: [email or web address]  URL: [email or web address]  Date: 05/16/2025  Prepared by: Judd Crouch    Exercises  - Seated Upper Trapezius Stretch  - 1-2 x daily - 7 x weekly - 3 reps - 20 sec hold  - Seated Assisted Cervical Rotation with Towel  - 1-2 x daily - 7 x weekly - 3 reps - 20 se hold  - Seated Scalene Stretch with Towel  - 1-2 x daily - 7 x weekly - 3 reps - 20 sec hold  - Standing Balance in Corner with Eyes Closed (chair in front)  - 1-2  x daily - 7 x weekly - 2 sets - 1-2 min hold  - Corner Balance Feet Together With Eyes Open (chair in front)  - 1-2 x daily - 7 x weekly - 2 reps - 1-2 min hold  - Single Leg Stance with Support  - 1-2 x daily - 7 x weekly - 2 reps - 1-2 min hold  - Standing Shoulder Flexion Wall Slides  - 1-2 x daily - 7 x weekly - 10 reps - 10 sec hold  - Cervical Extension AROM with Strap  - 1-2 x daily - 7 x weekly - 10 reps - 10 sec hold  - Standing Shoulder Horizontal Abduction with Resistance  - 3 x weekly - 2 sets - 10-20 reps    Goals  - Full return to prior level of function to allow continued working capability. - 100% complete  - Reduce DHI outcome measure goal to less than or equal to 6 for meaningful and clinical improvements in dizziness condition. - 75% copmlete  - Full resolution of dizziness to improve quality of life. - 100% complete  - Patient to have no falls and negative test on condition 7 within [place] SOP testing to improve postural control and balance. - 75% complete  - Increase cervical AROM greater than or equal to 60° into flexion, 35° into extension, 30° bilateral into side bending and 55° bilateral into rotation without pain for ease of gazing while driving and neck movement during chores. - 100% complete    Plan of care developed in agreement with patient.     Personal Factors Affecting Care: Comorbidities  PT Clinical Presentation: Evolving with changing characteristics  Rehab Potential: Good       Screening  Frequency  Date Last Completed   Spiritual and Cultural Beliefs   Screening  each visit or episode of care    Falls Risk Screening  every ambulatory visit    Pain Screening  annually at primary care visit  5/8/2025   Domestic Violence screening  annually at primary care visit    Elder Abuse Screening  annually at primary care visit    Depression Screening  annually in the primary care setting 5/9/2025   Suicide Risk Screening  annually in the primary care setting 2/19/2024   Nutrition and Food  Insecurity   Screening  at least annually at primary care visit  2/19/2024   Key Learner  annually in the primary care setting    Drug Screen  5/8/2025  7:25 AM   Alcohol Screen  5/8/2025  7:42 AM   Advance Directive  5/8/2025          Time Calculation  Start Time: 0948  Stop Time: 1030  Time Calculation (min): 42 min     PT Therapeutic Procedures Time Entry  Manual Therapy Time Entry: 25  Neuromuscular Re-Education Time Entry: 15

## 2025-05-28 PROCEDURE — RXMED WILLOW AMBULATORY MEDICATION CHARGE

## 2025-05-30 DIAGNOSIS — S52.122S CLOSED DISPLACED FRACTURE OF HEAD OF LEFT RADIUS, SEQUELA: ICD-10-CM

## 2025-05-30 PROBLEM — S52.122A CLOSED DISPLACED FRACTURE OF HEAD OF LEFT RADIUS: Status: ACTIVE | Noted: 2025-05-30

## 2025-05-31 ENCOUNTER — PHARMACY VISIT (OUTPATIENT)
Dept: PHARMACY | Facility: CLINIC | Age: 75
End: 2025-05-31
Payer: COMMERCIAL

## 2025-06-09 ENCOUNTER — APPOINTMENT (OUTPATIENT)
Dept: OTOLARYNGOLOGY | Facility: CLINIC | Age: 75
End: 2025-06-09
Payer: MEDICARE

## 2025-06-10 ENCOUNTER — APPOINTMENT (OUTPATIENT)
Dept: RADIOLOGY | Facility: CLINIC | Age: 75
End: 2025-06-10
Payer: MEDICARE

## 2025-06-10 DIAGNOSIS — S52.122S CLOSED DISPLACED FRACTURE OF HEAD OF LEFT RADIUS, SEQUELA: ICD-10-CM

## 2025-06-10 PROCEDURE — 73200 CT UPPER EXTREMITY W/O DYE: CPT | Mod: LEFT SIDE | Performed by: STUDENT IN AN ORGANIZED HEALTH CARE EDUCATION/TRAINING PROGRAM

## 2025-06-10 PROCEDURE — 73200 CT UPPER EXTREMITY W/O DYE: CPT | Mod: LT

## 2025-06-22 DIAGNOSIS — E78.5 DYSLIPIDEMIA: ICD-10-CM

## 2025-06-23 PROCEDURE — RXMED WILLOW AMBULATORY MEDICATION CHARGE

## 2025-06-23 RX ORDER — ALIROCUMAB 75 MG/ML
75 INJECTION, SOLUTION SUBCUTANEOUS
Qty: 2 ML | Refills: 2 | Status: SHIPPED | OUTPATIENT
Start: 2025-06-23

## 2025-06-28 ENCOUNTER — PHARMACY VISIT (OUTPATIENT)
Dept: PHARMACY | Facility: CLINIC | Age: 75
End: 2025-06-28
Payer: COMMERCIAL

## 2025-07-20 ENCOUNTER — DOCUMENTATION (OUTPATIENT)
Dept: ORTHOPEDIC SURGERY | Facility: HOSPITAL | Age: 75
End: 2025-07-20
Payer: MEDICARE

## 2025-07-20 NOTE — H&P (VIEW-ONLY)
IMAGING RESULTS    The CT scan of the left elbow from Nina 10 was reviewed.  The fractures are well-healed.  The radial head implant appears solidly fixed.  Hardware position is appropriate.  There are no intrusions into the ulnohumeral joint.  There is heterotopic ossification as expected from the plain x-rays around the radial head implant extending proximal to the articular margin.  This is the most likely source of the impingement click.      SURGICAL INDICATION    I reviewed the options for further management of this condition and the likely success rates of each.  The patient feels that they have maximized the benefits of conservative care, and they do want to go on to surgery.    I reviewed the major risks of surgery including infection; scarring; damage to nerves, tendons, or vessels; stiffness; continued or worsened instability, nonunion, malunion, refracture, failure to relieve pain, and wound healing problems, as well as anesthesia risks.  I answered their questions to their satisfaction.  They were given my contact information and will contact the office when they are ready to schedule an exact surgical date.  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

## 2025-07-20 NOTE — PROGRESS NOTES
IMAGING RESULTS    The CT scan of the left elbow from Nina 10 was reviewed.  The fractures are well-healed.  The radial head implant appears solidly fixed.  Hardware position is appropriate.  There are no intrusions into the ulnohumeral joint.  There is heterotopic ossification as expected from the plain x-rays around the radial head implant extending proximal to the articular margin.  This is the most likely source of the impingement click.      SURGICAL INDICATION    I reviewed the options for further management of this condition and the likely success rates of each.  The patient feels that they have maximized the benefits of conservative care, and they do want to go on to surgery.    I reviewed the major risks of surgery including infection; scarring; damage to nerves, tendons, or vessels; stiffness; continued or worsened instability, nonunion, malunion, refracture, failure to relieve pain, and wound healing problems, as well as anesthesia risks.  I answered their questions to their satisfaction.  They were given my contact information and will contact the office when they are ready to schedule an exact surgical date.  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

## 2025-07-21 DIAGNOSIS — S52.122S CLOSED DISPLACED FRACTURE OF HEAD OF LEFT RADIUS, SEQUELA: ICD-10-CM

## 2025-07-21 DIAGNOSIS — M24.522 CONTRACTURE OF LEFT ELBOW: ICD-10-CM

## 2025-07-22 PROBLEM — M24.522 CONTRACTURE OF LEFT ELBOW: Status: ACTIVE | Noted: 2025-07-21

## 2025-07-22 PROCEDURE — RXMED WILLOW AMBULATORY MEDICATION CHARGE

## 2025-07-25 ENCOUNTER — PRE-ADMISSION TESTING (OUTPATIENT)
Dept: PREADMISSION TESTING | Facility: HOSPITAL | Age: 75
End: 2025-07-25
Payer: MEDICARE

## 2025-07-25 VITALS
BODY MASS INDEX: 23.92 KG/M2 | OXYGEN SATURATION: 99 % | SYSTOLIC BLOOD PRESSURE: 132 MMHG | WEIGHT: 148.81 LBS | RESPIRATION RATE: 16 BRPM | HEIGHT: 66 IN | TEMPERATURE: 98.1 F | DIASTOLIC BLOOD PRESSURE: 91 MMHG | HEART RATE: 89 BPM

## 2025-07-25 DIAGNOSIS — Z01.818 PREOP TESTING: Primary | ICD-10-CM

## 2025-07-25 PROCEDURE — 93010 ELECTROCARDIOGRAM REPORT: CPT | Performed by: INTERNAL MEDICINE

## 2025-07-25 PROCEDURE — 93005 ELECTROCARDIOGRAM TRACING: CPT

## 2025-07-25 PROCEDURE — 99204 OFFICE O/P NEW MOD 45 MIN: CPT | Performed by: NURSE PRACTITIONER

## 2025-07-25 RX ORDER — CETIRIZINE HYDROCHLORIDE 10 MG/1
10 TABLET ORAL DAILY
COMMUNITY

## 2025-07-25 ASSESSMENT — PAIN SCALES - GENERAL
PAINLEVEL_OUTOF10: 0 - NO PAIN
PAINLEVEL_OUTOF10: 1

## 2025-07-25 ASSESSMENT — PAIN - FUNCTIONAL ASSESSMENT: PAIN_FUNCTIONAL_ASSESSMENT: 0-10

## 2025-07-25 NOTE — CPM/PAT H&P
CPM/PAT Evaluation       Name: Victorina Velez (Victorina Velez)  /Age: 1950/74 y.o.     In-Person       Chief Complaint: Contracture of left elbow     HPI  Patient is an alert and oriented 74 year old female scheduled for a  Left Elbow Capsular Release and Excision of Heterotopic on 25 with Dr. Thakur for  Contracture of left elbow . PMHX includes hypothyroidism, COPD, lung cancer, breast cancer, GERD. Presents to [facility] PAT today for perioperative risk stratification and optimization.     Medical History[1]    Surgical History[2]    Patient  reports that she is not currently sexually active and has had partner(s) who are male. She reports using the following methods of birth control/protection: Post-menopausal and None.    Family History[3]    Allergies[4]    Prior to Admission medications   Medication Sig Start Date End Date Taking? Authorizing Provider   alirocumab (Praluent Pen) 75 mg/mL pen injector Inject 75 mg under the skin every 14 (fourteen) days. 25   Raghavendra Chávez MD   biotin 5 mg tablet Take by mouth.    Historical Provider, MD   calcium carbonate (CALCIUM 500 ORAL) Take 1 capsule by mouth 2 times a week.    Historical Provider, MD   cetirizine (ZyrTEC) 5 mg chewable tablet Chew once daily.    Historical Provider, MD   cholecalciferol (Vitamin D-3) 125 MCG (5000 UT) capsule Take 1 tablet by mouth once daily.    Historical Provider, MD   estradiol (Estrace) 0.01 % (0.1 mg/gram) vaginal cream Insert 0.125 Applicatorfuls (0.5 g) into the vagina 3 times a week. 24   Rani Hua MD   magnesium oxide (Mag-Ox) 400 mg tablet Take 1 tablet (400 mg) by mouth once daily.    Historical Provider, MD   multivitamin tablet Take 1 tablet by mouth 1 time.    Historical Provider, MD   neomycin-polymyxin B-dexameth (Polydex) 3.5 mg/g-10,000 unit/g-0.1 % ointment ophthalmic ointment APPLY TO BOTH UPPER EYELIDS TWICE A DAY AS DIRECTED FOR 10 DAYS 3/4/25   Historical Provider, MD   omeprazole  "(PriLOSEC) 20 mg DR capsule TAKE 2 CAPSULES (40 MG) BY MOUTH ONCE DAILY. DO NOT CRUSH OR CHEW. 2/17/25 8/16/25  Raghavendra Chávez MD   THYROID ORAL Take 1 Capful by mouth once daily. Plant based for T3-T4- holistic pharmacy    Historical Provider, MD          Visit Vitals  BP (!) 132/91   Pulse 89   Temp 36.7 °C (98.1 °F) (Temporal)   Resp 16   Ht 1.676 m (5' 6\")   Wt 67.5 kg (148 lb 13 oz)   SpO2 99%   BMI 24.02 kg/m²   OB Status Postmenopausal   Smoking Status Former   BSA 1.77 m²     Review of Systems   Constitutional: Negative for chills, decreased appetite, diaphoresis, fever and malaise/fatigue.   Eyes:  Negative for blurred vision and double vision.   Cardiovascular:  Negative for chest pain, claudication, cyanosis, dyspnea on exertion, irregular heartbeat, leg swelling, near-syncope and palpitations.   Respiratory:  Negative for cough, hemoptysis, shortness of breath and wheezing.    Endocrine: Negative for cold intolerance, heat intolerance, polydipsia, polyphagia and polyuria.   Gastrointestinal:  Negative for abdominal pain, constipation, diarrhea, dysphagia, nausea and vomiting.   Genitourinary:  Negative for bladder incontinence, dysuria, hematuria, incomplete emptying, nocturia, frequency, pelvic pain and urgency.   Neurological:  Negative for headaches, light-headedness, paresthesias, sensory change and weakness.   Psychiatric/Behavioral:  Negative for altered mental status.    Musculoskeletal: Positive for myalgias, arthralgias     Vitals and nursing note reviewed.     Physical exam  Constitutional:       Appearance: Normal appearance. She is Normal.   HENT:      Head: Normocephalic and atraumatic.      Mouth/Throat:      Mouth: Mucous membranes are moist.      Pharynx: Oropharynx is clear.   Eyes:      Extraocular Movements: Extraocular movements intact.      Conjunctiva/sclera: Conjunctivae normal.      Pupils: Pupils are equal, round, and reactive to light.   Cardiovascular:      PMI at left " midclavicular line. Normal rate. Regular rhythm. Normal S1. Normal S2.       Murmurs: There is no murmur.      No gallop.  No click. No rub.       No audible carotid bruit     No lower extremity edema on exam  Pulmonary:      Effort: Pulmonary effort is normal.      Breath sounds: Normal breath sounds.   Abdominal:      General: Abdomen is flat. Bowel sounds are normal.      Palpations: Abdomen is soft and non-tender  Musculoskeletal:      Cervical back: Normal range of motion and neck supple.   Skin:     General: Skin is warm and dry.      Capillary Refill: Capillary refill takes less than 2 seconds.   Neurological:      General: No focal deficit present.      Mental Status: She is alert and oriented to person, place, and time. Mental status is at baseline.   Psychiatric:         Mood and Affect: Mood normal.         Behavior: Behavior normal.         Thought Content: Thought content normal.         Judgment: Judgment normal.     Vitals and nursing note reviewed. Physical exam within normal limits.       DASI Risk Score      Flowsheet Row Questionnaire Series Submission from 7/22/2025 in [facility] OR with Generic Provider Maxine   Can you take care of yourself (eat, dress, bathe, or use toilet)?  2.75  filed at 07/22/2025 1240   Can you walk indoors, such as around your house? 1.75  filed at 07/22/2025 1240   Can you walk a block or two on level ground?  2.75  filed at 07/22/2025 1240   Can you climb a flight of stairs or walk up a hill? 5.5  filed at 07/22/2025 1240   Can you run a short distance? 8  filed at 07/22/2025 1240   Can you do light work around the house like dusting or washing dishes? 2.7  filed at 07/22/2025 1240   Can you do moderate work around the house like vacuuming, sweeping floors or carrying groceries? 3.5  filed at 07/22/2025 1240   Can you do heavy work around the house like scrubbing floors or lifting and moving heavy furniture?  8  filed at 07/22/2025 1240   Can you do  yard work like raking leaves, weeding or pushing a mower? 4.5  filed at 07/22/2025 1240   Can you have sexual relations? 5.25  filed at 07/22/2025 1240   Can you participate in moderate recreational activities like golf, bowling, dancing, doubles tennis or throwing a baseball or football? 6  filed at 07/22/2025 1240   Can you participate in strenous sports like swimming, singles tennis, football, basketball, or skiing? 0  filed at 07/22/2025 1240   DASI SCORE 50.7  filed at 07/22/2025 1240   METS Score (Will be calculated only when all the questions are answered) 9  filed at 07/22/2025 1240     Capsharroni DVT Assessment      Flowsheet Row Pre-Admission Testing from 7/25/2025 in [facility] Admission (Discharged) from 2/19/2024 in [facility] 2 with Dontae John MD   DVT Score (IF A SCORE IS NOT CALCULATING, MUST SELECT A BMI TO COMPLETE) 9 filed at 07/25/2025 1114 9 filed at 02/19/2024 1507   Medical Factors Present cancer, chemotherapy, or previous malignancy, COPD, Inflammatory bowel disease filed at 07/25/2025 1114 --   Surgical Factors Major surgery planned, including arthroscopic and laproscopic (1-2 hours) filed at 07/25/2025 1114 --   BMI (BMI MUST BE CHOSEN) 30 or less filed at 07/25/2025 1114 --   RETIRED: Current Status -- Elective major lower extremity arthroplasty filed at 02/19/2024 1507   RETIRED: History -- Previous malignancy filed at 02/19/2024 1507   RETIRED: Age -- 60-75 years filed at 02/19/2024 1507     Modified Frailty Index    No data to display  [id number] Stroke Risk Points  Current as of just now        N/A 0 to 9 Points:      Last Change: N/A          The [id number] risk score (Tyree BELLO, et al. 2009. © 2010 American [employer]) quantifies the risk of stroke for a patient with atrial fibrillation. For patients without atrial fibrillation or under the age of 18 this score appears as N/A. Higher score values generally indicate higher  risk of stroke.        This score is not applicable to this patient. Components are not calculated.          Revised Cardiac Risk Index      Flowsheet Row Pre-Admission Testing from 7/25/2025 in [facility]   High-Risk Surgery (Intraperitoneal, Intrathoracic,Suprainguinal vascular) 0 filed at 07/25/2025 1115   History of ischemic heart disease (History of MI, History of positive exercuse test, Current chest paint considered due to myocardial ischemia, Use of nitrate therapy, ECG with pathological Q Waves) 0 filed at 07/25/2025 1115   History of congestive heart failure (pulmonary edemia, bilateral rales or S3 gallop, Paroxysmal nocturnal dyspnea, CXR showing pulmonary vascular redistribution) 0 filed at 07/25/2025 1115   History of cerebrovascular disease (Prior TIA or stroke) 0 filed at 07/25/2025 1115   Pre-operative insulin treatment 0 filed at 07/25/2025 1115   Pre-operative creatinine>2 mg/dl 0 filed at 07/25/2025 1115   Revised Cardiac Risk Calculator 0 filed at 07/25/2025 1115     Apfel Simplified Score    No data to display  Risk Analysis Index Results This Encounter    No data found in the last 10 encounters.       Stop Bang Score      Flowsheet Row Pre-Admission Testing from 7/25/2025 in [facility] Questionnaire Series Submission from 7/22/2025 in [facility] OR with Generic Provider Maxine   Do you snore loudly? 0 filed at 07/25/2025 1034 0  filed at 07/22/2025 1240   Do you often feel tired or fatigued after your sleep? 1 filed at 07/25/2025 1034 0  filed at 07/22/2025 1240   Has anyone ever observed you stop breathing in your sleep? 0 filed at 07/25/2025 1034 0  filed at 07/22/2025 1240   Do you have or are you being treated for high blood pressure? 0 filed at 07/25/2025 1034 0  filed at 07/22/2025 1240   Recent BMI (Calculated) 23.6 filed at 07/25/2025 1034 23.6  filed at 07/22/2025 1240   Is BMI greater than 35 kg/m2? 0=No filed at 07/25/2025 1034 0=No   filed at 07/22/2025 1240   Age older than 50 years old? 1=Yes filed at 07/25/2025 1034 1=Yes  filed at 07/22/2025 1240   Is your neck circumference greater than 17 inches (Male) or 16 inches (Female)? 0 filed at 07/25/2025 1034 --   Gender - Male 0=No filed at 07/25/2025 1034 0=No  filed at 07/22/2025 1240   STOP-BANG Total Score 2 filed at 07/25/2025 1034 --     Prodigy: High Risk  Total Score: 12              Prodigy Age Score           ARISCAT Score for Postoperative Pulmonary Complications    No data to display  Steff Perioperative Risk for Myocardial Infarction or Cardiac Arrest (DON)      Flowsheet Row Pre-Admission Testing from 7/25/2025 in [facility]   Calculated Age Score 1.5 filed at 07/25/2025 1115   Functional Status  0 filed at 07/25/2025 1115   ASA Class  -3.29 filed at 07/25/2025 1115   Creatinine 0 filed at 07/25/2025 1115   Type of Procedure  0.80 filed at 07/25/2025 1115   DON Total Score  -6.24 filed at 07/25/2025 1115   DON % 0.19 filed at 07/25/2025 1115       Assessment & Plan:    Neuro:  Anxiety    HEENT/Airway:  No diagnosis or significant findings on chart review or clinical presentation and evaluation.   STOP-BANG Score- 2 points low risk for NELSON    Mallampati::  II    TM distance::  >3 FB    Neck ROM::  Full  Dentures-denies  Crowns-denies  Implants-denies    Cardiovascular:  HLD (alirocumab)  METS: 9  RCRI: 0 points, 3.9%  risk for postoperative MACE   DON: 0.19% risk for postoperative MACE  EKG -Normal sinus rhythm    Pulmonary:  COPD (untreated)    HA  Adenocarcinoma of the right lung status post right lower lobe lobectomy December 18, 2014-mediastinal lymph node dissection status post chemoradiation.   ARISCAT: <26 points, 1.6% risk of in-hospital postoperative pulmonary complication  PRODIGY: Moderate risk for opioid induced respiratory depression  Smoking History-smoked in high school   Deep breathing handout given    Renal/Urinary:  No diagnosis or significant  findings on chart review or clinical presentation and evaluation, however, the patient is at increased risk of perioperative renal complications secondary to age>/= 56. Preventative measures include BP monitoring, medication compliance, and hydration management.   CMP  Creatinine-0.79  GFR-78    Endocrine:  Hypothyroidism  TSH-2.79      Hematologic/Immunology:  Ductal carcinoma in situ left breast status post lumpectomy September 2016; radiation completed 12/16   The patient is not a Jehovah’s witness and will accept blood and blood products if medically indicated.   History of previous blood transfusions 2002  CBC  HGB-13.2/40.7  Caprini Score 9, patient at Moderate for postoperative DVT. Pt supplied education/VTE handout  Anticoagulation use: No     Gastrointestinal:   GERD (omeprazole)   IBS  Recreational drug use: none  Alcohol use 1 liquor drinks per week    Infectious disease:   No diagnosis or significant findings on chart review or clinical presentation and evaluation.     Musculoskeletal:   Elbow pain left  Fracture of the left ulna and left radius-status post repair June 2015   OA with History of bilateral MITRA  Multilevel cervical spondylosis Scoliosis thoracic spine.  JHFRAT score- 10 points. moderate risk for falls    Anesthesia:  ASA 2 - Patient with mild systemic disease with no functional limitations  Anticipated anesthesia-general  History of General anesthesia- yes  Complications- No anesthesia complications  No family history of anesthesia complications    Labs & Imaging ordered:  EKG  Nickel/metal allergy-negative  Shellfish allergy-negative    Discussed with patient medication instructions, NPO guidelines, and any questions or concerns.     time spent 45 minutes  All resulted testing from PAT was forwarded to the surgeon and the patient's PCP if applicable.           [1]   Past Medical History:  Diagnosis Date    Anxiety     somewhat over last few years    Arthritis     horse back riding injuries     BRCA1 negative Dont remember    BRCA2 negative Don’t remember    Breast cancer 2016    Chronic rhinitis 04/24/2019    Chronic rhinitis    Dry eyes     Dysphonia 08/26/2021    Hoarseness    Dyspnea, unspecified 02/09/2016    Acute dyspnea    Familial hypercholesterolemia 04/24/2018    Essential familial hypercholesterolemia    Fractures     2015 radius and ulnar bones in left arm    GERD (gastroesophageal reflux disease)     10 years    Hx antineoplastic chemo 2014    HX: breast cancer 09/2016    s/p partial mastectomy w/ radiation and tamoxifen (ductal and lobular carcinoma in situ)    Hypothyroidism 2004 2004    Lobular carcinoma in situ 2016    Lumbar disc disease     horse back riding    Osteoporosis     Pericarditis (HHS-HCC)     Personal history of irradiation     Personal history of other endocrine, nutritional and metabolic disease 04/27/2020    History of hypothyroidism    Personal history of pneumonia (recurrent) 07/28/2014    History of pneumonia    Pleurodynia 04/17/2015    Chest pain, pleuritic    Primary lung cancer (Multi) 2014    s/p concurrent chemoRT, RLL lobectomy and mediastinal node dissection, adjuvent chemo in [id number]    Rosacea     Sinusitis 2010    Sometimes but not currently    Spinal stenosis     Urinary tract infection    [2]   Past Surgical History:  Procedure Laterality Date    BREAST LUMPECTOMY Left 09/09/2016    Postive for Ductal and lobular carcinoma in situ    BRONCHOSCOPY  09/24/2014    w/ mediastinoscopy (Positive for primary lung cancer w/ mets to LN)    CATARACT EXTRACTION  2022    CATARACT EXTRACTION, BILATERAL Bilateral 2022    COLONOSCOPY  2021    ELBOW FRACTURE SURGERY Left 06/12/2015    ORIF    HIP SURGERY Right 02/21/2017    Hip Surgery- 2022    JOINT REPLACEMENT  2002 and 2024    both hips    LUNG LOBECTOMY Right 12/18/2014    RLL lobectomy and mediastinal LN dissection    LUNG SURGERY Right 04/23/2015    VATS for pleural effusion s/p right lower lobectomy w/  chest tube    MASTECTOMY, PARTIAL Left 09/26/2016    Positve margins on prior lumpectomy    TOTAL HIP ARTHROPLASTY Left 02/19/2024   [3]   Family History  Problem Relation Name Age of Onset    Breast cancer Mother Jacqui     Cancer Mother Jacqui 70 - 79    Osteoporosis Mother Jacqui     Cancer Father Ramsey 50 - 59   [4]   Allergies  Allergen Reactions    Amoxicillin-Pot Clavulanate Other

## 2025-07-25 NOTE — PREPROCEDURE INSTRUCTIONS
Medication List            Accurate as of July 25, 2025 10:49 AM. Always use your most recent med list.                biotin 5 mg tablet  Additional Medication Adjustments for Surgery: Take last dose 7 days before surgery  Notes to patient: last dose preoperatively on 8/6/25     CALCIUM 500 ORAL  Additional Medication Adjustments for Surgery: Take last dose 7 days before surgery  Notes to patient: last dose preoperatively on 8/6/25     cetirizine 10 mg tablet  Commonly known as: ZyrTEC  Medication Adjustments for Surgery: Do Not take on the morning of surgery     cholecalciferol 125 mcg (5,000 units) capsule  Commonly known as: Vitamin D-3  Additional Medication Adjustments for Surgery: Take last dose 7 days before surgery  Notes to patient: last dose preoperatively on 8/6/25     estradiol 0.01 % (0.1 mg/gram) vaginal cream  Commonly known as: Estrace  Insert 0.125 Applicatorfuls (0.5 g) into the vagina 3 times a week.  Medication Adjustments for Surgery: Do Not take on the morning of surgery     IBUPROFEN PM ORAL  Additional Medication Adjustments for Surgery: Take last dose 7 days before surgery  Notes to patient: last dose preoperatively on 8/6/25     magnesium oxide 400 mg tablet  Commonly known as: Mag-Ox  Medication Adjustments for Surgery: Do Not take on the morning of surgery     multivitamin tablet  Additional Medication Adjustments for Surgery: Take last dose 7 days before surgery  Notes to patient: last dose preoperatively on 8/6/25     neomycin-polymyxin B-dexameth 3.5 mg/g-10,000 unit/g-0.1 % ointment ophthalmic ointment  Commonly known as: Polydex  Medication Adjustments for Surgery: Take/Use as prescribed     omeprazole 20 mg DR capsule  Commonly known as: PriLOSEC  TAKE 2 CAPSULES (40 MG) BY MOUTH ONCE DAILY. DO NOT CRUSH OR CHEW.  Medication Adjustments for Surgery: Take/Use as prescribed     Praluent Pen 75 mg/mL pen injector  Generic drug: alirocumab  Inject 75 mg under the skin every 14  (fourteen) days.  Medication Adjustments for Surgery: Do Not take on the morning of surgery     THYROID ORAL  Medication Adjustments for Surgery: Take/Use as prescribed            NPO Instructions:     Do not eat any food after midnight the night before your surgery/procedure.  You may have clear liquids until TWO hours before surgery/procedure. This includes water, black tea/coffee, (no milk or cream) apple juice and electrolyte drinks (Gatorade).  You may chew gum up to TWO hours before your surgery/procedure.     Additional Instructions:      Seven/Six Days before Surgery:  Review your medication instructions, stop indicated medications  Five Days before Surgery:  Review your medication instructions, stop indicated medications  Three Days before Surgery:  Review your medication instructions, stop indicated medications  The Day before Surgery:  No smoking or alcohol use 24 hours before surgery  Review your medication instructions, stop indicated medications  You will be contacted regarding the time of your arrival to facility and surgery time  Do not eat any food after Midnight  Day of Surgery:  Review your medication instructions, take indicated medications  If you have diabetes, please check your fasting blood sugar upon awakening.  If fasting blood sugar is <80 mg/dl, drink 100 ml of apple juice, time limit of 2 hours before  You may have clear liquids until TWO hours before surgery/procedure.  This includes water, black tea/coffee, (no milk or cream) apple juice and electrolyte drinks (Gatorade)  You may chew gum up to TWO hours before your surgery/procedure  Wear  comfortable loose fitting clothing  Do not use moisturizers, creams, lotions or perfume  All jewelry and valuables should be left at home     CONTACT SURGEON'S OFFICE IF YOU DEVELOP:  * Fever = 100.4 F   * New respiratory symptoms (e.g. cough, shortness of breath, respiratory distress, sore throat)  * Recent loss of taste or smell  *Flu like  symptoms such as headache, fatigue or gastrointestinal symptoms  * You develop any open sores, shingles, burning or painful urination   AND/OR:  * You no longer wish to have the surgery.  * Any other personal circumstances change that may lead to the need to cancel or defer this surgery.  *You were admitted to any hospital within one week of your planned procedure.     SMOKING:  *Quitting smoking can make a huge difference to your health and recovery from surgery.    *If you need help with quitting, call 0-694-QUIT-NOW.     THE DAY BEFORE SURGERY:  *Do not eat any food after midnight the night before your surgery.   *You may have up to TEN OUNCES of clear liquids until TWO hours before surgery/procedure.. This includes water, black tea/coffee, (no milk or cream) apple juice, and electrolyte drinks (Gatorade). Please avoid clear liquids that are red in color.   *You may chew gum/mints up to TWO hours before your surgery/procedure.     SURGICAL TIME:  *You will be contacted between 2 p.m. and 3 p.m. the business day before your surgery with your arrival time.  *If you haven't received a call by 3pm, call [phone]  *Scheduled surgery times may change and you will be notified if this occurs-check your personal voicemail for any updates.     ON THE MORNING OF SURGERY:  *Wear comfortable, loose fitting clothing.   *Do not use moisturizers, creams, lotions or perfume.  *All jewelry and valuables should be left at home.  *Prosthetic devices such as contact lenses, hearing aids, dentures, eyelash extensions, hairpins and body piercing must be removed before surgery.     BRING WITH YOU:  *Photo ID and insurance card  *Current list of medications and allergies  *Pacemaker/Defibrillator/Heart stent cards  *CPAP machine and mask  *Slings/splints/crutches  *Copy of your complete Advanced Directive/DHPOA-if applicable  *Neurostimulator implant remote     PARKING AND ARRIVAL:  *Check in at the Main Entrance desk and let them  know you are here for surgery.     IF YOU ARE HAVING OUTPATIENT/SAME DAY SURGERY:  *A responsible adult MUST accompany you at the time of discharge and stay with you for 24 hours after your surgery.  *You may NOT drive yourself home after surgery.  *You may use a taxi or ride sharing service ([employer], Uber) to return home ONLY if you are accompanied by a friend or family member.  *Instructions for resuming your medications will be provided by your surgeon.     Thank you for coming to Pre Admission testing.      If I have prescribed medication please don't forget to  at your pharmacy.      Any questions about today's visit call [phone] and leave a message in the general mailbox.     Patient instructed to ambulate as soon as possible postoperatively to decrease thromboembolic risk.       [facility] you for [facility].  If you have any changes to your health condition, please call the surgeons office to alert them and give them details of your symptoms.        Preoperative Fasting Guidelines     Why must I stop eating and drinking near surgery time?  With sedation, food or liquid in your stomach can enter your lungs causing serious complications  Increases nausea and vomiting     When do I need to stop eating and drinking before my surgery?  Do not eat any food after midnight the night before your surgery/procedure.  You may have up to TEN ounces of clear liquid until TWO hours before your surgery/procedure.  This includes water, black tea/coffee, (no milk or cream) apple juice, and electrolyte drinks (Gatorade)  You may chew gum until TWO hours before your surgery/procedure        Additional Instructions:      The Day before Surgery:  -Review your medication instructions, stop indicated medications  -You will be contacted in the evening regarding the time of your arrival to facility and surgery time     Day of Surgery:  -Review your medication instructions, take indicated  medications  -Wear comfortable loose fitting clothing  -Do not use moisturizers, creams, lotions or perfume  -All jewelry and valuables should be left at home                   Preoperative Brain Exercises     What are brain exercises?  A brain exercise is any activity that engages your thinking (cognitive) skills.     What types of activities are considered brain exercises?  Jigsaw puzzles, crossword puzzles, word jumble, memory games, word search, and many more.  Many can be found free online or on your phone via a mobile xavi.     Why should I do brain exercises before my surgery?  More recent research has shown brain exercise before surgery can lower the risk of postoperative delirium (confusion) which can be especially important for older adults.  Patients who did brain exercises for 5 to 10 minutes/day in the days before surgery, cut their risk of postoperative delirium in half up to 1 week after surgery.                         [facility]     Preoperative Deep Breathing Exercises     Why it is important to do deep breathing exercises before my surgery?  Deep breathing exercises strengthen your breathing muscles.  This helps you to recover after your surgery and decreases the chance of breathing complications.        How are the deep breathing exercises done?  Sit straight with your back supported.  Breathe in deeply and slowly through your nose. Your lower rib cage should expand and your abdomen may move forward.  Hold that breath for 3 to 5 seconds.  Breathe out through pursed lips, slowly and completely.  Rest and repeat 10 times every hour while awake.  Rest longer if you become dizzy or lightheaded.                      [facility]     Preoperative Deep Breathing Exercises     Why it is important to do deep breathing exercises before my surgery?  Deep breathing exercises strengthen your breathing muscles.  This helps you to recover after your surgery and  decreases the chance of breathing complications.        How are the deep breathing exercises done?  Sit straight with your back supported.  Breathe in deeply and slowly through your nose. Your lower rib cage should expand and your abdomen may move forward.  Hold that breath for 3 to 5 seconds.  Breathe out through pursed lips, slowly and completely.  Rest and repeat 10 times every hour while awake.  Rest longer if you become dizzy or lightheaded.        Patient Information: Incentive Spirometer  What is an incentive spirometer?  An incentive spirometer is a device used before and after surgery to “exercise” your lungs.  It helps you to take deeper breaths to expand your lungs.  Below is an example of a basic incentive spirometer.  The device you receive may differ slightly but they all function the same.    Why do I need to use an incentive spirometer?  Using your incentive spirometer prepares your lungs for surgery and helps prevent lung problems after surgery.  How do I use my incentive spirometer?  When you're using your incentive spirometer, make sure to breathe through your mouth. If you breathe through your nose, the incentive spirometer won't work properly. You can hold your nose if you have trouble.  If you feel dizzy at any time, stop and rest. Try again at a later time.  Follow the steps below:  Set up your incentive spirometer, expand the flexible tubing and connect to the outlet.  Sit upright in a chair or bed. Hold the incentive spirometer at eye level.   Put the mouthpiece in your mouth and close your lips tightly around it. Slowly breathe out (exhale) completely.  Breathe in (inhale) slowly through your mouth as deeply as you can. As you take a breath, you will see the piston rise inside the large column. While the piston rises, the indicator should move upwards. It should stay in between the 2 arrows (see Figure).  Try to get the piston as high as you can, while keeping the indicator between the  arrows.   If the indicator doesn't stay between the arrows, you're breathing either too fast or too slow.  When you get it as high as you can, hold your breath for 10 seconds, or as long as possible. While you're holding your breath, the piston will slowly fall to the base of the spirometer.  Once the piston reaches the bottom of the spirometer, breathe out slowly through your mouth. Rest for a few seconds.  Repeat 10 times. Try to get the piston to the same level with each breath.  Repeat every hour while awake  You can carefully clean the outside of the mouthpiece with an alcohol wipe or soap and water.       Patient and Family Education             Ways You Can Help Prevent Blood Clots                    This handout explains some simple things you can do to help prevent blood clots.      Blood clots are blockages that can form in the body's veins. When a blood clot forms in your deep veins, it may be called a deep vein thrombosis, or DVT for short. Blood clots can happen in any part of the body where blood flows, but they are most common in the arms and legs. If a piece of a blood clot breaks free and travels to the lungs, it is called a pulmonary embolus (PE). A PE can be a very serious problem.         Being in the hospital or having surgery can raise your chances of getting a blood clot because you may not be well enough to move around as much as you normally do.         Ways you can help prevent blood clots in the hospital           Wearing SCDs. SCDs stands for Sequential Compression Devices.   SCDs are special sleeves that wrap around your legs  They attach to a pump that fills them with air to gently squeeze your legs every few minutes.   This helps return the blood in your legs to your heart.   SCDs should only be taken off when walking or bathing.   SCDs may not be comfortable, but they can help save your life.                                            Wearing compression stockings - if your doctor  orders them. These special snug fitting stockings gently squeeze your legs to help blood flow.       Walking. Walking helps move the blood in your legs.   If your doctor says it is ok, try walking the halls at least   5 times a day. Ask us to help you get up, so you don't fall.      Taking any blood thinning medicines your doctor orders.        Page 1 of 2            [facility]; 3/23   Ways you can help prevent blood clots at home         Wearing compression stockings - if your doctor orders them. ? Walking - to help move the blood in your legs.       Taking any blood thinning medicines your doctor orders.      Signs of a blood clot or PE        Tell your doctor or nurse know right away if you have of the problems listed below.    If you are at home, seek medical care right away. Call 911 for chest pain or problems breathing.                Signs of a blood clot (DVT) - such as pain,  swelling, redness or warmth in your arm or leg      Signs of a pulmonary embolism (PE) - such as chest pain or feeling short of breath    Preoperative Clearances  -If you are informed by the preadmission testing team that you need clearance for your surgery, please reach out to the provider in question to assisting accommodating obtaining your clearance.   -Please have you provider fax your clearance letter to [phone] if needed.   -A blank clearance letter will be provided to you if indicated.     Anticoagulation  -If you are on oral anticoagulation such as Coumadin, Eliquis, Xarelto, Plavix, Brilinta, Pradaxa; we will need to obtain preoperative anticoagulation instructions from the prescribing provider.   -We will reach out to the prescriber but do encourage you to call their office as well as it will increase the chances of getting the necessary information.   -We will contact you with the instruction once we obtain them.

## 2025-07-26 ENCOUNTER — PHARMACY VISIT (OUTPATIENT)
Dept: PHARMACY | Facility: CLINIC | Age: 75
End: 2025-07-26
Payer: COMMERCIAL

## 2025-07-26 LAB
ATRIAL RATE: 89 BPM
P AXIS: 33 DEGREES
P OFFSET: 179 MS
P ONSET: 123 MS
PR INTERVAL: 182 MS
Q ONSET: 214 MS
QRS COUNT: 14 BEATS
QRS DURATION: 80 MS
QT INTERVAL: 382 MS
QTC CALCULATION(BAZETT): 464 MS
QTC FREDERICIA: 435 MS
R AXIS: 5 DEGREES
T AXIS: 17 DEGREES
T OFFSET: 405 MS
VENTRICULAR RATE: 89 BPM

## 2025-08-04 ENCOUNTER — ANESTHESIA EVENT (OUTPATIENT)
Dept: OPERATING ROOM | Facility: HOSPITAL | Age: 75
End: 2025-08-04
Payer: MEDICARE

## 2025-08-04 ENCOUNTER — ANESTHESIA (OUTPATIENT)
Dept: OPERATING ROOM | Facility: HOSPITAL | Age: 75
End: 2025-08-04
Payer: MEDICARE

## 2025-08-04 ENCOUNTER — HOSPITAL ENCOUNTER (OUTPATIENT)
Facility: HOSPITAL | Age: 75
Setting detail: OUTPATIENT SURGERY
Discharge: HOME | End: 2025-08-04
Attending: ORTHOPAEDIC SURGERY | Admitting: ORTHOPAEDIC SURGERY
Payer: MEDICARE

## 2025-08-04 VITALS
TEMPERATURE: 97.3 F | HEART RATE: 96 BPM | SYSTOLIC BLOOD PRESSURE: 132 MMHG | HEIGHT: 66 IN | RESPIRATION RATE: 16 BRPM | OXYGEN SATURATION: 94 % | BODY MASS INDEX: 23.63 KG/M2 | WEIGHT: 147 LBS | DIASTOLIC BLOOD PRESSURE: 77 MMHG

## 2025-08-04 DIAGNOSIS — G89.18 ACUTE POSTOPERATIVE PAIN: Primary | ICD-10-CM

## 2025-08-04 PROCEDURE — 2500000004 HC RX 250 GENERAL PHARMACY W/ HCPCS (ALT 636 FOR OP/ED): Performed by: ANESTHESIOLOGY

## 2025-08-04 PROCEDURE — 7100000001 HC RECOVERY ROOM TIME - INITIAL BASE CHARGE: Performed by: ORTHOPAEDIC SURGERY

## 2025-08-04 PROCEDURE — 3600000008 HC OR TIME - EACH INCREMENTAL 1 MINUTE - PROCEDURE LEVEL THREE: Performed by: ORTHOPAEDIC SURGERY

## 2025-08-04 PROCEDURE — A24149: Performed by: ANESTHESIOLOGIST ASSISTANT

## 2025-08-04 PROCEDURE — 3700000001 HC GENERAL ANESTHESIA TIME - INITIAL BASE CHARGE: Performed by: ORTHOPAEDIC SURGERY

## 2025-08-04 PROCEDURE — 2500000004 HC RX 250 GENERAL PHARMACY W/ HCPCS (ALT 636 FOR OP/ED): Performed by: ORTHOPAEDIC SURGERY

## 2025-08-04 PROCEDURE — 7100000002 HC RECOVERY ROOM TIME - EACH INCREMENTAL 1 MINUTE: Performed by: ORTHOPAEDIC SURGERY

## 2025-08-04 PROCEDURE — 3700000002 HC GENERAL ANESTHESIA TIME - EACH INCREMENTAL 1 MINUTE: Performed by: ORTHOPAEDIC SURGERY

## 2025-08-04 PROCEDURE — 3600000003 HC OR TIME - INITIAL BASE CHARGE - PROCEDURE LEVEL THREE: Performed by: ORTHOPAEDIC SURGERY

## 2025-08-04 PROCEDURE — 64415 NJX AA&/STRD BRCH PLXS IMG: CPT | Performed by: ANESTHESIOLOGY

## 2025-08-04 PROCEDURE — 2500000004 HC RX 250 GENERAL PHARMACY W/ HCPCS (ALT 636 FOR OP/ED): Performed by: ANESTHESIOLOGIST ASSISTANT

## 2025-08-04 PROCEDURE — 24145 PRTL EXC BONE RADIAL H/N: CPT | Performed by: ORTHOPAEDIC SURGERY

## 2025-08-04 PROCEDURE — 99100 ANES PT EXTEME AGE<1 YR&>70: CPT | Performed by: ANESTHESIOLOGY

## 2025-08-04 PROCEDURE — A24149: Performed by: ANESTHESIOLOGY

## 2025-08-04 PROCEDURE — 7100000009 HC PHASE TWO TIME - INITIAL BASE CHARGE: Performed by: ORTHOPAEDIC SURGERY

## 2025-08-04 PROCEDURE — 7100000010 HC PHASE TWO TIME - EACH INCREMENTAL 1 MINUTE: Performed by: ORTHOPAEDIC SURGERY

## 2025-08-04 RX ORDER — BUPIVACAINE HYDROCHLORIDE 5 MG/ML
INJECTION, SOLUTION PERINEURAL AS NEEDED
Status: DISCONTINUED | OUTPATIENT
Start: 2025-08-04 | End: 2025-08-04 | Stop reason: HOSPADM

## 2025-08-04 RX ORDER — LIDOCAINE HYDROCHLORIDE 10 MG/ML
INJECTION, SOLUTION INTRAVENOUS AS NEEDED
Status: DISCONTINUED | OUTPATIENT
Start: 2025-08-04 | End: 2025-08-04

## 2025-08-04 RX ORDER — METOCLOPRAMIDE HYDROCHLORIDE 5 MG/ML
10 INJECTION INTRAMUSCULAR; INTRAVENOUS ONCE AS NEEDED
Status: DISCONTINUED | OUTPATIENT
Start: 2025-08-04 | End: 2025-08-04 | Stop reason: HOSPADM

## 2025-08-04 RX ORDER — SODIUM CHLORIDE, SODIUM LACTATE, POTASSIUM CHLORIDE, CALCIUM CHLORIDE 600; 310; 30; 20 MG/100ML; MG/100ML; MG/100ML; MG/100ML
50 INJECTION, SOLUTION INTRAVENOUS CONTINUOUS
Status: DISCONTINUED | OUTPATIENT
Start: 2025-08-04 | End: 2025-08-04 | Stop reason: HOSPADM

## 2025-08-04 RX ORDER — HYDROCODONE BITARTRATE AND ACETAMINOPHEN 5; 325 MG/1; MG/1
1 TABLET ORAL EVERY 6 HOURS PRN
Qty: 20 TABLET | Refills: 0 | Status: SHIPPED | OUTPATIENT
Start: 2025-08-04

## 2025-08-04 RX ORDER — CEFAZOLIN SODIUM 2 G/100ML
2 INJECTION, SOLUTION INTRAVENOUS ONCE
Status: COMPLETED | OUTPATIENT
Start: 2025-08-04 | End: 2025-08-04

## 2025-08-04 RX ORDER — MIDAZOLAM HYDROCHLORIDE 1 MG/ML
2 INJECTION, SOLUTION INTRAMUSCULAR; INTRAVENOUS ONCE
Status: COMPLETED | OUTPATIENT
Start: 2025-08-04 | End: 2025-08-04

## 2025-08-04 RX ORDER — FENTANYL CITRATE 50 UG/ML
50 INJECTION, SOLUTION INTRAMUSCULAR; INTRAVENOUS EVERY 5 MIN PRN
Status: DISCONTINUED | OUTPATIENT
Start: 2025-08-04 | End: 2025-08-04 | Stop reason: HOSPADM

## 2025-08-04 RX ORDER — HYDROMORPHONE HYDROCHLORIDE 0.2 MG/ML
0.2 INJECTION INTRAMUSCULAR; INTRAVENOUS; SUBCUTANEOUS EVERY 5 MIN PRN
Status: DISCONTINUED | OUTPATIENT
Start: 2025-08-04 | End: 2025-08-04 | Stop reason: HOSPADM

## 2025-08-04 RX ORDER — ONDANSETRON HYDROCHLORIDE 2 MG/ML
4 INJECTION, SOLUTION INTRAVENOUS ONCE AS NEEDED
Status: DISCONTINUED | OUTPATIENT
Start: 2025-08-04 | End: 2025-08-04 | Stop reason: HOSPADM

## 2025-08-04 RX ORDER — OXYCODONE HYDROCHLORIDE 5 MG/1
5 TABLET ORAL EVERY 4 HOURS PRN
Status: DISCONTINUED | OUTPATIENT
Start: 2025-08-04 | End: 2025-08-04 | Stop reason: HOSPADM

## 2025-08-04 RX ORDER — HYDROCODONE BITARTRATE AND ACETAMINOPHEN 5; 325 MG/1; MG/1
1 TABLET ORAL EVERY 6 HOURS PRN
Qty: 20 TABLET | Refills: 0 | Status: SHIPPED | OUTPATIENT
Start: 2025-08-04 | End: 2025-08-04

## 2025-08-04 RX ORDER — LIDOCAINE HYDROCHLORIDE 10 MG/ML
0.1 INJECTION, SOLUTION EPIDURAL; INFILTRATION; INTRACAUDAL; PERINEURAL ONCE
Status: DISCONTINUED | OUTPATIENT
Start: 2025-08-04 | End: 2025-08-04 | Stop reason: HOSPADM

## 2025-08-04 RX ORDER — ROPIVACAINE HYDROCHLORIDE 5 MG/ML
INJECTION, SOLUTION EPIDURAL; INFILTRATION; PERINEURAL
Status: COMPLETED | OUTPATIENT
Start: 2025-08-04 | End: 2025-08-04

## 2025-08-04 RX ORDER — ONDANSETRON HYDROCHLORIDE 2 MG/ML
INJECTION, SOLUTION INTRAVENOUS AS NEEDED
Status: DISCONTINUED | OUTPATIENT
Start: 2025-08-04 | End: 2025-08-04

## 2025-08-04 RX ORDER — PHENYLEPHRINE HCL IN 0.9% NACL 1 MG/10 ML
SYRINGE (ML) INTRAVENOUS AS NEEDED
Status: DISCONTINUED | OUTPATIENT
Start: 2025-08-04 | End: 2025-08-04

## 2025-08-04 RX ORDER — LIDOCAINE HYDROCHLORIDE 10 MG/ML
INJECTION, SOLUTION INFILTRATION; PERINEURAL AS NEEDED
Status: DISCONTINUED | OUTPATIENT
Start: 2025-08-04 | End: 2025-08-04 | Stop reason: HOSPADM

## 2025-08-04 RX ORDER — SODIUM CHLORIDE, SODIUM LACTATE, POTASSIUM CHLORIDE, CALCIUM CHLORIDE 600; 310; 30; 20 MG/100ML; MG/100ML; MG/100ML; MG/100ML
100 INJECTION, SOLUTION INTRAVENOUS CONTINUOUS
Status: DISCONTINUED | OUTPATIENT
Start: 2025-08-04 | End: 2025-08-04 | Stop reason: HOSPADM

## 2025-08-04 RX ORDER — PROPOFOL 10 MG/ML
INJECTION, EMULSION INTRAVENOUS AS NEEDED
Status: DISCONTINUED | OUTPATIENT
Start: 2025-08-04 | End: 2025-08-04

## 2025-08-04 RX ORDER — FENTANYL CITRATE 50 UG/ML
50 INJECTION, SOLUTION INTRAMUSCULAR; INTRAVENOUS ONCE
Status: COMPLETED | OUTPATIENT
Start: 2025-08-04 | End: 2025-08-04

## 2025-08-04 RX ORDER — FENTANYL CITRATE 50 UG/ML
INJECTION, SOLUTION INTRAMUSCULAR; INTRAVENOUS AS NEEDED
Status: DISCONTINUED | OUTPATIENT
Start: 2025-08-04 | End: 2025-08-04

## 2025-08-04 RX ADMIN — FENTANYL CITRATE 25 MCG: 50 INJECTION, SOLUTION INTRAMUSCULAR; INTRAVENOUS at 13:51

## 2025-08-04 RX ADMIN — SODIUM CHLORIDE, POTASSIUM CHLORIDE, SODIUM LACTATE AND CALCIUM CHLORIDE: 600; 310; 30; 20 INJECTION, SOLUTION INTRAVENOUS at 12:42

## 2025-08-04 RX ADMIN — PROPOFOL 30 MG: 10 INJECTION, EMULSION INTRAVENOUS at 13:51

## 2025-08-04 RX ADMIN — ROPIVACAINE HYDROCHLORIDE 20 ML: 5 INJECTION, SOLUTION EPIDURAL; INFILTRATION; PERINEURAL at 12:32

## 2025-08-04 RX ADMIN — FENTANYL CITRATE 25 MCG: 50 INJECTION, SOLUTION INTRAMUSCULAR; INTRAVENOUS at 13:00

## 2025-08-04 RX ADMIN — FENTANYL CITRATE 50 MCG: 50 INJECTION INTRAMUSCULAR; INTRAVENOUS at 12:28

## 2025-08-04 RX ADMIN — ONDANSETRON 4 MG: 2 INJECTION, SOLUTION INTRAMUSCULAR; INTRAVENOUS at 13:27

## 2025-08-04 RX ADMIN — Medication 200 MCG: at 12:54

## 2025-08-04 RX ADMIN — DEXAMETHASONE SODIUM PHOSPHATE 4 MG: 4 INJECTION, SOLUTION INTRAMUSCULAR; INTRAVENOUS at 12:55

## 2025-08-04 RX ADMIN — Medication 100 MCG: at 12:52

## 2025-08-04 RX ADMIN — MIDAZOLAM 2 MG: 1 INJECTION INTRAMUSCULAR; INTRAVENOUS at 12:28

## 2025-08-04 RX ADMIN — FENTANYL CITRATE 25 MCG: 50 INJECTION, SOLUTION INTRAMUSCULAR; INTRAVENOUS at 13:27

## 2025-08-04 RX ADMIN — LIDOCAINE HYDROCHLORIDE 50 MG: 10 INJECTION, SOLUTION INTRAVENOUS at 12:48

## 2025-08-04 RX ADMIN — PROPOFOL 170 MG: 10 INJECTION, EMULSION INTRAVENOUS at 12:48

## 2025-08-04 RX ADMIN — CEFAZOLIN SODIUM 2 G: 2 INJECTION, SOLUTION INTRAVENOUS at 12:44

## 2025-08-04 RX ADMIN — FENTANYL CITRATE 25 MCG: 50 INJECTION, SOLUTION INTRAMUSCULAR; INTRAVENOUS at 13:37

## 2025-08-04 SDOH — HEALTH STABILITY: MENTAL HEALTH: CURRENT SMOKER: 0

## 2025-08-04 ASSESSMENT — PAIN SCALES - GENERAL
PAINLEVEL_OUTOF10: 0 - NO PAIN

## 2025-08-04 ASSESSMENT — PAIN - FUNCTIONAL ASSESSMENT
PAIN_FUNCTIONAL_ASSESSMENT: 0-10

## 2025-08-04 ASSESSMENT — COLUMBIA-SUICIDE SEVERITY RATING SCALE - C-SSRS
6. HAVE YOU EVER DONE ANYTHING, STARTED TO DO ANYTHING, OR PREPARED TO DO ANYTHING TO END YOUR LIFE?: NO
2. HAVE YOU ACTUALLY HAD ANY THOUGHTS OF KILLING YOURSELF?: NO
1. IN THE PAST MONTH, HAVE YOU WISHED YOU WERE DEAD OR WISHED YOU COULD GO TO SLEEP AND NOT WAKE UP?: NO

## 2025-08-04 NOTE — INTERVAL H&P NOTE
[facility] Department of Orthopaedic Surgery     History & Physical Reviewed:  Victorina Velez is a 74 y.o. female presenting with the above symptoms. H&P reviewed. The patient was examined and there are no changes to the H&P.    Medical History[1]  Surgical History[2]  Social History     Tobacco Use    Smoking status: Former     Current packs/day: 0.00     Average packs/day: 1 pack/day for 5.0 years (5.0 ttl pk-yrs)     Types: Cigarettes     Quit date: 1971     Years since quittin.6    Smokeless tobacco: Never    Tobacco comments:     Smoked during her teenage years. None since   Substance Use Topics    Alcohol use: Yes     Alcohol/week: 1.0 standard drink of alcohol     Types: 1 Glasses of wine per week     Comment: 1 drink a week     Prior to Admission medications   Medication Sig Start Date End Date Taking? Authorizing Provider   alirocumab (Praluent Pen) 75 mg/mL pen injector Inject 75 mg under the skin every 14 (fourteen) days. 25  Yes Raghavendra Chávez MD   biotin 5 mg tablet Take by mouth.   Yes Historical Provider, MD   calcium carbonate (CALCIUM 500 ORAL) Take 1 capsule by mouth 2 times a week.   Yes Historical Provider, MD   cetirizine (ZyrTEC) 10 mg tablet Take 1 tablet (10 mg) by mouth once daily.   Yes Historical Provider, MD   cholecalciferol (Vitamin D-3) 125 MCG (5000 UT) capsule Take 1 tablet by mouth once daily.   Yes Historical Provider, MD   estradiol (Estrace) 0.01 % (0.1 mg/gram) vaginal cream Insert 0.125 Applicatorfuls (0.5 g) into the vagina 3 times a week. 24  Yes Rani Hua MD   ibuprofen/diphenhydramine cit (IBUPROFEN PM ORAL) Take 2 tablets by mouth once daily at bedtime.   Yes Historical Provider, MD   magnesium oxide (Mag-Ox) 400 mg tablet Take 1 tablet (400 mg) by mouth once daily.   Yes Historical Provider, MD   multivitamin tablet Take 1 tablet by mouth 1 time.   Yes Historical Provider, MD   neomycin-polymyxin B-dexameth (Polydex) 3.5 mg/g-10,000  unit/g-0.1 % ointment ophthalmic ointment APPLY TO BOTH UPPER EYELIDS TWICE A DAY AS DIRECTED FOR 10 DAYS 3/4/25  Yes Historical Provider, MD   omeprazole (PriLOSEC) 20 mg DR capsule TAKE 2 CAPSULES (40 MG) BY MOUTH ONCE DAILY. DO NOT CRUSH OR CHEW. 2/17/25 8/16/25 Yes Raghavendra Chávez MD   THYROID ORAL Take 1 Capful by mouth once daily. Plant based for T3-T4- holistic pharmacy   Yes Historical Provider, MD     RX Allergies[3]    ERAS patient?: No    COVID-19 Risk Consent:   Surgeon has reviewed the key risks related to prince COVID-19 and subsequent sequelae.       Kiran Jacobo MD 08/04/25          [1]   Past Medical History:  Diagnosis Date    Anxiety     somewhat over last few years    Arthritis     horse back riding injuries    BRCA1 negative Dont remember    BRCA2 negative Don’t remember    Breast cancer 2016    Chronic rhinitis 04/24/2019    Chronic rhinitis    Dry eyes     Dysphonia 08/26/2021    Hoarseness    Dyspnea, unspecified 02/09/2016    Acute dyspnea    Familial hypercholesterolemia 04/24/2018    Essential familial hypercholesterolemia    Fractures     2015 radius and ulnar bones in left arm    GERD (gastroesophageal reflux disease)     10 years    Hx antineoplastic chemo 2014    HX: breast cancer 09/2016    s/p partial mastectomy w/ radiation and tamoxifen (ductal and lobular carcinoma in situ)    Hypothyroidism 2004    2004    Lobular carcinoma in situ 2016    Lumbar disc disease     horse back riding    Osteoporosis     Pericarditis ([facility]CC)     Personal history of irradiation     Personal history of other endocrine, nutritional and metabolic disease 04/27/2020    History of hypothyroidism    Personal history of pneumonia (recurrent) 07/28/2014    History of pneumonia    Pleurodynia 04/17/2015    Chest pain, pleuritic    Primary lung cancer (Multi) 2014    s/p concurrent chemoRT, RLL lobectomy and mediastinal node dissection, adjuvent chemo in [id number]    Rosacea     Sinusitis 2010     Sometimes but not currently    Spinal stenosis     Urinary tract infection    [2]   Past Surgical History:  Procedure Laterality Date    BREAST LUMPECTOMY Left 09/09/2016    Postive for Ductal and lobular carcinoma in situ    BRONCHOSCOPY  09/24/2014    w/ mediastinoscopy (Positive for primary lung cancer w/ mets to LN)    CATARACT EXTRACTION  2022    CATARACT EXTRACTION, BILATERAL Bilateral 2022    COLONOSCOPY  2021    ELBOW FRACTURE SURGERY Left 06/12/2015    ORIF    HIP SURGERY Right 02/21/2017    Hip Surgery- 2022    JOINT REPLACEMENT  2002 and 2024    both hips    LUNG LOBECTOMY Right 12/18/2014    RLL lobectomy and mediastinal LN dissection    LUNG SURGERY Right 04/23/2015    VATS for pleural effusion s/p right lower lobectomy w/ chest tube    MASTECTOMY, PARTIAL Left 09/26/2016    Positve margins on prior lumpectomy    TOTAL HIP ARTHROPLASTY Left 02/19/2024   [3]   Allergies  Allergen Reactions    Amoxicillin-Pot Clavulanate GI Upset

## 2025-08-04 NOTE — NURSING NOTE
1429: Patient in Phase 2; dressed and up to chair with RN assist. Tolerating po fluids, no complaint of pain and no complaint of nausea.     1445: Family at bedside; discussed discharge instructions with patient and Family. All questions and concerns answered at this time.    1501: Vitals stable/baseline.    1502: IV removed.     1504: Patient transported to discharge area via wheelchair safely.

## 2025-08-04 NOTE — ANESTHESIA PREPROCEDURE EVALUATION
Patient: Victorina Velez    Procedure Information       Date/Time: 08/04/25 1146    Procedures:       Left Elbow Capsular Release and Excision of Heterotopic Bone (Left: Elbow) - PLUS BLOCK      with possible Removal of Ulna Plate / Screws (SYNTHES LOCKING SMALL FRAGMENT SCREWDRIVER) (Left: Elbow) - PLUS BLOCK    Location: [facility] / [facility]    Surgeons: Arsen Thakur MD            Relevant Problems   Anesthesia (within normal limits)      Cardiac (within normal limits)      Pulmonary   (+) Adenocarcinoma of lung (Multi)   (+) Chronic obstructive pulmonary disease (Multi)   (+) HA (dyspnea on exertion)   (+) Lung cancer (Multi)   (+) Malignant neoplasm of upper lobe of right lung (Multi)      Neuro   (+) Anxiety      GI   (+) Dysphagia   (+) Esophageal reflux      /Renal (within normal limits)      Liver (within normal limits)      Endocrine   (+) Hypothyroidism (acquired)      Hematology (within normal limits)      Musculoskeletal   (+) Primary osteoarthritis of first carpometacarpal joint of right hand   (+) Primary osteoarthritis of left hip   (+) Unilateral primary osteoarthritis, left hip      HEENT   (+) Acute non-recurrent maxillary sinusitis      ID   (+) Furuncle of extremity      Skin   (+) Dyshidrosis (pompholyx)   (+) Rash and other nonspecific skin eruption      GYN (within normal limits)       Clinical information reviewed:   Tobacco  Allergies  Meds   Med Hx  Surg Hx  OB Status  Fam Hx  Soc   Hx        NPO Detail:  NPO/Void Status  Carbohydrate Drink Given Prior to Surgery? : N  Date of Last Liquid: 08/04/25  Time of Last Liquid: 0800  Date of Last Solid: 08/03/25  Time of Last Solid: 1900  Last Intake Type: Clear fluids  Time of Last Void: 1015         Physical Exam    Airway  Mallampati: I  TM distance: >3 FB  Neck ROM: full  Mouth opening: 3 or more finger widths     Cardiovascular - normal exam   Dental - normal exam     Pulmonary - normal exam   Abdominal - normal exam            Anesthesia Plan    History of general anesthesia?: yes  History of complications of general anesthesia?: no    ASA 3     general     The patient is not a current smoker.    intravenous induction   Anesthetic plan and risks discussed with patient.  Use of blood products discussed with patient who consented to blood products.

## 2025-08-04 NOTE — BRIEF OP NOTE
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]     * Closed displaced fracture of head of left radius, sequela [S52.122S]     Procedures  Left Elbow Capsular Release and Excision of Heterotopic Bone  29756 - CT RAD RESCJ CAPSL TISS&HTRTPC B1 ELBW CONTRCT RLS    with possible Removal of Ulna Plate / Screws (SYNTHES LOCKING SMALL FRAGMENT SCREWDRIVER)  08763 - CT REMOVAL IMPLANT DEEP      Surgeons      * Arsen Thakur - Primary    Resident/Fellow/Other Assistant:  Surgeons and Role:  * No surgeons found with a matching role *    Staff:   Circulator: Guera Dongub Person: Sandra    Anesthesia Staff: Anesthesiologist: Antonio Ayala MD  C-AA: BRITTNEY Elliott    Procedure Summary  Anesthesia: General  ASA: III  Estimated Blood Loss: 5mL  Intra-op Medications:   Administrations occurring from 1146 to 1406 on 25:   Medication Name Total Dose   lidocaine (Xylocaine) 10 mg/mL (1 %) injection 3 mL   BUPivacaine HCl (Marcaine) 0.5 % (5 mg/mL) injection 3 mL   lactated Ringer's infusion Cannot be calculated   ceFAZolin (Ancef) 2 g in dextrose (iso)  mL 2 g   fentaNYL PF (Sublimaze) injection 50 mcg 50 mcg   midazolam (Versed) injection 2 mg 2 mg   ropivacaine (Naropin) injection 0.5 % 20 mL   dexAMETHasone (Decadron) 4 mg/mL 4 mg   fentaNYL (Sublimaze) injection 50 mcg/mL 100 mcg   lidocaine PF (cardiac) syringe 1% 50 mg   ondansetron (Zofran) 2 mg/mL injection 4 mg   phenylephrine 100 mcg/mL syringe 10 mL (prefilled) 300 mcg   propofol (Diprivan) injection 10 mg/mL 200 mg              Anesthesia Record               Intraprocedure I/O Totals          Intake    lactated Ringer's infusion 600.00 mL    ceFAZolin (Ancef) 2 g in  dextrose (iso)  mL 100.00 mL    Total Intake 700 mL       Output    Est. Blood Loss 1 mL    Total Output 1 mL       Net    Net Volume 699 mL          Specimen: No specimens collected               Findings: Left elbow heterotopic ossification excision    Complications:  None; patient tolerated the procedure well.     Disposition: PACU - hemodynamically stable.  Condition: stable  Specimens Collected: No specimens collected  Attending Attestation: I was present and scrubbed for the entire procedure.    Arsen Thakur  Phone Number: [phone]

## 2025-08-04 NOTE — DISCHARGE INSTRUCTIONS
Follow-Up Instructions  You will need to be seen in clinic by Dr. Thakur in 1-2 weeks for a post-operative evaluation.  This appointment will be in the outpatient surgical specialty services [place], on the campus of [facility].    You will need to call and schedule an appointment, unless there is a previous appointment that appears on your discharge instructions.  The direct orthopaedic clinic appointment line phone number is [phone].  Please do not delay in calling to make this appointment.    You should also follow up with your primary care provider in 1-2 weeks.    Activity Restrictions  1) No driving until further instructed by your orthopaedic physician, which will be addressed at your outpatient appointments.    2) No driving or operating heavy machinery while taking narcotic pain medication.    3) Weight bearing status --> weight bearing as tolerated left upper extremity.    4) You received a pre-operative nerve block, please allow time for normal function and sensation to return. If function and sensation haven't returned by Thursday please reach out to the office to schedule an earlier appointment     Discharge Medications  You have been sent home with the following home medications: norco.  Please wean yourself off the norco, as tolerated. A good time to take the medication is before physical therapy sessions and bedtime.     If you are experiencing pain, please take Tylenol and/or ibuprofen over the counter. Wait 30 minutes, and if your pain is still uncontrolled, take a dose of norco as directed. You may take these medications every 6 hours if needed. It is normal to experience some pain after surgery.    MEDICATION SIDE EFFECTS.  Norco: constipation, nausea, vomiting, upset stomach, (sleepiness), dizziness, lightheadedness, itching, headache, blurred vision, dry mouth, sweating

## 2025-08-04 NOTE — ANESTHESIA PROCEDURE NOTES
Peripheral Block    Patient location during procedure: pre-op  Medication administered at: 8/4/2025 12:32 PM  End time: 8/4/2025 12:34 PM  Reason for block: at surgeon's request and post-op pain management  Staffing  Performed: attending   Authorized by: Adria Johnson MD    Performed by: Adria Johnosn MD  Preanesthetic Checklist  Completed: patient identified, IV checked, site marked, risks and benefits discussed, surgical consent, monitors and equipment checked, pre-op evaluation and timeout performed   Timeout performed at: 8/4/2025 12:27 PM  Peripheral Block  Patient position: laying flat  Prep: ChloraPrep  Patient monitoring: heart rate, cardiac monitor and continuous pulse ox  Block type: interscalene  Laterality: left  Injection technique: single-shot  Guidance: ultrasound guided  Local infiltration: ropivacaine  Infiltration strength: 0.5 %  Dose: 20 mL  Needle  Needle type: short-bevel   Needle gauge: 22 G  Needle length: 5 cm  Needle localization: ultrasound guidance  Assessment  Injection assessment: negative aspiration for heme, no paresthesia on injection, incremental injection and local visualized surrounding nerve on ultrasound  Paresthesia pain: none  Heart rate change: no  Slow fractionated injection: yes  Medications Administered  ropivacaine (NAROPIN) 5 MG/ML Perineural - perineural injection   20 mL - 8/4/2025 12:32:00 PM

## 2025-08-04 NOTE — ANESTHESIA POSTPROCEDURE EVALUATION
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])      (Closed displaced fracture of head of left radius, sequela [S52.122S])    Surgeons: Arsen Thakur MD Responsible Provider: Antonio Ayala MD    Anesthesia Type: general ASA Status: 3            Anesthesia Type: general    Vitals Value Taken Time   /87 08/04/25 14:10   Temp 36.3 °C (97.3 °F) 08/04/25 13:57   Pulse 92 08/04/25 14:10   Resp 18 08/04/25 14:10   SpO2 95 % 08/04/25 14:10       Anesthesia Post Evaluation    Patient location during evaluation: bedside  Patient participation: complete - patient participated  Level of consciousness: awake and alert  Pain management: adequate  Airway patency: patent  Cardiovascular status: acceptable  Respiratory status: acceptable  Hydration status: acceptable  Postoperative Nausea and Vomiting: none        There were no known notable events for this encounter.

## 2025-08-04 NOTE — ANESTHESIA PROCEDURE NOTES
Airway  Date/Time: 8/4/2025 12:50 PM  Reason: elective    Airway not difficult    Staffing  Performed: BRITTNEY   Authorized by: Antonio Ayala MD    Performed by: BRITTNEY Elliott  Patient location during procedure: OR    Patient Condition  Indications for airway management: anesthesia  Patient position: sniffing  Sedation level: deep     Final Airway Details   Preoxygenated: yes  Final airway type: supraglottic airway  Successful airway: classic  Size: 4  Number of attempts at approach: 1    Additional Comments  EASY, ATRAUMATIC LMA PLACEMENT TO GOOD SEAL  SOFT BITE BLOCK PLACED

## 2025-08-05 NOTE — OP NOTE
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 for remote open fracture dislocation    POSTOP DX:       Same    PROCEDURE:     Excision of heterotopic ossification and overgrowth, left radial head    SURGEON: JR Blaze MD    RESIDENT/FELLOW/ASSISTANT:  Kiran Jacobo MD    ANESTHESIA:    Supraclavicular block plus General Via LMA    ESTIMATED BLOOD LOSS :   5 ml    TOTAL FLUIDS:     600 cc LR    SPECIMEN:   None    IMPLANTS:    None    TOURNIQUET TIME:    25 minutes at 250 mmHg    COMPLICATIONS:  None    PATIENT RETURNED TO/CONDITION:  PACU in Good      INDICATIONS:      Victorina Velez is a 74 y.o., right-hand-dominant female who presents with limitation on left elbow extension and click with hinge elbow motion that varies with rotational forearm position, following a remote radial head arthroplasty and ORIF ulnar shaft for fracture dislocation.  X-rays and CT demonstrate hypertrophic bone along the radial head extending proximal to the proximal end of the arthroplasty.  She presents today for elective excision of that bone to try to improve range of motion and eliminate the click.  I reminded her of surgical risks of infection, scarring, damage to nerves, tendons, or vessels, stiffness, wound healing problems, failure to relieve symptoms, recurrent symptoms, implant instability, and need for further surgery.  She voiced understanding and wished to proceed.      NARRATIVE:       Following identification of patient and confirmation of correct site of surgery and signed operative consent, a supraclavicular block was placed by Anesthesia in preop holding.  She was then brought to the operating room and a hand table affixed to the cart.  A general anesthetic was administered via LMA, along with IV antibiotic dose.  A pneumatic  tourniquet was placed high on the left arm, and the limb was prepped from fingertip to cuff with chlorhexidine, and draped free in the usual sterile fashion.  6 cc of a mix of half percent Marcaine and 1% lidocaine plain was instilled to the planned incision area to supplement the block.  The limb was exsanguinated with an Esmarch, and the tourniquet inflated.    A 5 cm longitudinal oblique incision was made over Lorenzo's interval, and taken carefully bluntly down under high loupe magnification.  Crossing veins were treated with bipolar and divided.  The fascia was incised along the interval between the ECRL and ECRB, and extended proximally up the lateral column.  The muscles were bluntly elevated and the underlying fascia was divided longitudinally.  The muscle was carefully elevated from the capsule and out to the level of the radial neck.  The joint capsule was then incised longitudinally, exposing the arthroplasty.  Fully supinating the forearm brought the heterotopic bone into view.  This was carefully divided with an osteotome.  It was then removed piecemeal with a rongeur.  The implant was free to spin around its long axis as designed, but demonstrated no toggling that would suggest inappropriate looseness.  The preoperative lack of 25 degrees of full elbow extension improved out to within just 5 degrees of full extension.  This was accepted, and no formal capsulotomy or work in the posterior compartment was felt to be necessary.  Elbow flexion remained full.  The elbow was taken through a full range of motion with the lateral compartment under direct vision.  There was slight radiocapitellar posterior translation as the elbow came out into maximum extension, but no significant impingement at any point.  The preoperative clicks appeared to be gone.  The elbow was copiously irrigated and the deep muscle fascia repaired with running 3-0 Ethibond as a watertight layer.  Lorenzo's interval was similarly repaired  with a separate 3-0 Ethibond running stitch.  The tourniquet was deflated, and pink color rapidly returned to all digits.  Meticulous hemostasis was achieved with bipolar.  After final irrigation, skin was closed with 4-0 Vicryl subcutaneous and 4-0 Monocryl running subcuticular stitch.  Steri-Strips, Xeroform, and bulky soft dressing were applied.  The patient was awakened, extubated, and transferred to Recovery in stable condition.          Electronically signed  JEMMA Thakur MD  [phone]

## 2025-08-12 ENCOUNTER — APPOINTMENT (OUTPATIENT)
Dept: ORTHOPEDIC SURGERY | Facility: CLINIC | Age: 75
End: 2025-08-12
Payer: MEDICARE

## 2025-08-14 DIAGNOSIS — Z00.00 HEALTH MAINTENANCE EXAMINATION: ICD-10-CM

## 2025-08-14 RX ORDER — OMEPRAZOLE 20 MG/1
40 CAPSULE, DELAYED RELEASE ORAL DAILY
Qty: 180 CAPSULE | Refills: 1 | Status: SHIPPED | OUTPATIENT
Start: 2025-08-14 | End: 2026-02-10

## 2025-08-15 ENCOUNTER — OFFICE VISIT (OUTPATIENT)
Dept: ORTHOPEDIC SURGERY | Facility: HOSPITAL | Age: 75
End: 2025-08-15
Payer: MEDICARE

## 2025-08-15 DIAGNOSIS — S52.122S: Primary | ICD-10-CM

## 2025-08-15 DIAGNOSIS — M24.522 CONTRACTURE, LEFT ELBOW: ICD-10-CM

## 2025-08-15 PROCEDURE — 99212 OFFICE O/P EST SF 10 MIN: CPT | Performed by: ORTHOPAEDIC SURGERY

## 2025-08-15 ASSESSMENT — PAIN - FUNCTIONAL ASSESSMENT: PAIN_FUNCTIONAL_ASSESSMENT: NO/DENIES PAIN

## 2025-08-19 PROCEDURE — RXMED WILLOW AMBULATORY MEDICATION CHARGE

## 2025-08-23 ENCOUNTER — PHARMACY VISIT (OUTPATIENT)
Dept: PHARMACY | Facility: CLINIC | Age: 75
End: 2025-08-23
Payer: COMMERCIAL

## 2025-08-25 ENCOUNTER — APPOINTMENT (OUTPATIENT)
Dept: OBSTETRICS AND GYNECOLOGY | Facility: CLINIC | Age: 75
End: 2025-08-25
Payer: MEDICARE

## 2025-11-14 ENCOUNTER — APPOINTMENT (OUTPATIENT)
Dept: OPHTHALMOLOGY | Facility: CLINIC | Age: 75
End: 2025-11-14
Payer: MEDICARE

## 2026-05-13 ENCOUNTER — APPOINTMENT (OUTPATIENT)
Dept: OTOLARYNGOLOGY | Facility: CLINIC | Age: 76
End: 2026-05-13
Payer: MEDICARE

## 2026-05-20 ENCOUNTER — APPOINTMENT (OUTPATIENT)
Dept: PRIMARY CARE | Facility: CLINIC | Age: 76
End: 2026-05-20
Payer: MEDICARE

## 2026-08-21 ENCOUNTER — APPOINTMENT (OUTPATIENT)
Facility: CLINIC | Age: 76
End: 2026-08-21
Payer: MEDICARE

## (undated) DEVICE — TRAY, DRY PREP, PREMIUM

## (undated) DEVICE — SUTURE, VICRYL, 4-0, 18 IN, PS-4, UNDYED

## (undated) DEVICE — DRESSING, GAUZE, PETROLATUM, PATCH, XEROFORM, 1 X 8 IN, STERILE

## (undated) DEVICE — SUTURE, MONOCRYLIC, 4-0, P3, MONO 18

## (undated) DEVICE — SOLUTION, CHLORHEXIDINE, 4%, 4OZ

## (undated) DEVICE — NEEDLE, HYPODERMIC, MONOJECT, 22 G X 1.5 IN, BLUE, RIGID PACK

## (undated) DEVICE — DRESSING, ABDOMINAL, TENDERSORB, 8 X 7-1/2 IN, STERILE

## (undated) DEVICE — SUTURE, ETHIBOND XTRA, 5 V-37, GRN/BR, LF

## (undated) DEVICE — GOWN, SURGICAL, SIRUS, NON REINFORCED, LARGE

## (undated) DEVICE — DRESSING, NON-ADHERENT, 3 X 3 IN, STERILE

## (undated) DEVICE — TUBING, SUCTION, 6MM X 10, CLEAN N-COND

## (undated) DEVICE — SPLINT, FAST SETTING, 4 X 15 IN, PLASTER

## (undated) DEVICE — CUFF, TOURNIQUET, 18 X 4, DUAL PORT/SNGL BLADDER, DISP, LF

## (undated) DEVICE — BANDAGE, COMPRESSION, KNITTED, ELASTIC, SNGL VELCRO, 3IN, WHITE

## (undated) DEVICE — SPONGE, GAUZE, 12 PLY, 4 X 4, STERILE, DISP

## (undated) DEVICE — GLOVE, SURGICAL, PROTEXIS PI , 7.5, PF, LF

## (undated) DEVICE — SUTURE, VICRYL, 2-0, 18 IN CP-2, UNDYED

## (undated) DEVICE — GLOVE, SURGICAL, PROTEXIS PI BLUE W/NEUTHERA, 7.0, PF, LF

## (undated) DEVICE — Device

## (undated) DEVICE — BLANKET, LOWER BODY, VHA PLUS, ADULT

## (undated) DEVICE — SYRINGE, 50 CC, LUER LOCK

## (undated) DEVICE — RETRIEVER, SUTURE, STERILE

## (undated) DEVICE — TIP, SUCTION, YANKAUER, FLEXIBLE

## (undated) DEVICE — SUTURE, VICRYL, 0, 18 IN, CT-1, UNDYED

## (undated) DEVICE — DRAPE, INCISE, ANTIMICROBIAL, IOBAN 2, STERI DRAPE, 23 X 33 IN, DISPOSABLE, STERILE

## (undated) DEVICE — SUTURE, PROLENE 4-0, 18IN, PS2, BLUE MONO

## (undated) DEVICE — DRAPE, IRRIGATION, W/POUCH, ADHESIVE STRIP, STERI DRAPE, 19 X 23 IN, DISPOSABLE, STERILE

## (undated) DEVICE — BLADE, SAW, SAGITTAL, 18.5 X 73 X 0.76 MM, STAINLESS STEEL, STERILE

## (undated) DEVICE — NEEDLE, SPINAL, QUINCKE, 18 G X 3.5 IN, PINK HUB

## (undated) DEVICE — SUTURE, VICRYL, 2-0, 27 IN, BR/SH 27, VIOLET

## (undated) DEVICE — DRAPE, ISOLATION, INCISE, W/POUCH, STERI DRAPE, 125 X 83 IN, DISPOSABLE, STERILE

## (undated) DEVICE — STRIP, SKIN CLOSURE, STERI STRIP, REINFORCED, 0.5 X 4 IN

## (undated) DEVICE — SYRINGE, 10 CC, LUER LOCK

## (undated) DEVICE — PENCIL, ELECTROSURG, W/BUTTON SWITCH & HOLSTER, EZ CLEAN, DISP

## (undated) DEVICE — SUTURE, VICRYL, 3-0, 27 IN, SH

## (undated) DEVICE — PADDING, UNDERCAST, WEBRIL, 3 IN X 4 YD, REG, NS

## (undated) DEVICE — SOLUTION, IRRIGATION, X RX SODIUM CHL 0.9%, 1000ML BTL

## (undated) DEVICE — GLOVE, SURGICAL, PROTEXIS PI , 6.5, PF, LF

## (undated) DEVICE — NEEDLE, ELECTRODE, ELECTROSURGICAL, INSULATED

## (undated) DEVICE — GLOVE, SURGICAL, PROTEXIS PI , 8.0, PF, LF

## (undated) DEVICE — HOOD, SURGICAL, FLYTE HYBRID

## (undated) DEVICE — SPLINT, FAST SETTING, 3 X 15 IN, PLASTER, BLUE

## (undated) DEVICE — SYRINGE, 60 CC, IRRIGATION, BULB, CONTRO-BULB, PAPER POUCH